# Patient Record
Sex: FEMALE | Race: BLACK OR AFRICAN AMERICAN | Employment: OTHER | URBAN - METROPOLITAN AREA
[De-identification: names, ages, dates, MRNs, and addresses within clinical notes are randomized per-mention and may not be internally consistent; named-entity substitution may affect disease eponyms.]

---

## 2017-04-01 RX ORDER — METOPROLOL SUCCINATE 100 MG/1
TABLET, EXTENDED RELEASE ORAL
Qty: 30 TAB | Refills: 6 | Status: SHIPPED | OUTPATIENT
Start: 2017-04-01 | End: 2017-10-23 | Stop reason: SDUPTHER

## 2017-05-05 ENCOUNTER — OFFICE VISIT (OUTPATIENT)
Dept: ORTHOPEDIC SURGERY | Age: 67
End: 2017-05-05

## 2017-05-05 ENCOUNTER — OFFICE VISIT (OUTPATIENT)
Dept: CARDIOLOGY CLINIC | Age: 67
End: 2017-05-05

## 2017-05-05 VITALS
BODY MASS INDEX: 35.31 KG/M2 | HEART RATE: 61 BPM | DIASTOLIC BLOOD PRESSURE: 90 MMHG | HEIGHT: 67 IN | SYSTOLIC BLOOD PRESSURE: 150 MMHG | OXYGEN SATURATION: 98 % | WEIGHT: 225 LBS

## 2017-05-05 VITALS
HEIGHT: 67 IN | HEART RATE: 76 BPM | DIASTOLIC BLOOD PRESSURE: 78 MMHG | WEIGHT: 226 LBS | BODY MASS INDEX: 35.47 KG/M2 | SYSTOLIC BLOOD PRESSURE: 136 MMHG

## 2017-05-05 DIAGNOSIS — R07.9 CHEST PAIN, UNSPECIFIED: Primary | ICD-10-CM

## 2017-05-05 DIAGNOSIS — R00.2 PALPITATIONS: ICD-10-CM

## 2017-05-05 DIAGNOSIS — M17.11 PRIMARY OSTEOARTHRITIS OF RIGHT KNEE: ICD-10-CM

## 2017-05-05 DIAGNOSIS — E78.5 DYSLIPIDEMIA: ICD-10-CM

## 2017-05-05 DIAGNOSIS — M17.0 PRIMARY OSTEOARTHRITIS OF BOTH KNEES: Primary | ICD-10-CM

## 2017-05-05 DIAGNOSIS — Z86.718 HISTORY OF BLOOD CLOTS: ICD-10-CM

## 2017-05-05 RX ORDER — CIPROFLOXACIN 250 MG/1
TABLET, FILM COATED ORAL EVERY 12 HOURS
COMMUNITY
End: 2017-09-06

## 2017-05-05 NOTE — PROGRESS NOTES
Review of Systems   Constitutional: Positive for malaise/fatigue. Negative for chills, diaphoresis, fever and weight loss. Respiratory: Negative. Cardiovascular: Positive for chest pain, palpitations, claudication, leg swelling and PND. Negative for orthopnea. Gastrointestinal: Positive for heartburn. Negative for abdominal pain, blood in stool, constipation, diarrhea, melena, nausea and vomiting. Musculoskeletal: Positive for back pain, joint pain, myalgias and neck pain. Negative for falls. Neurological: Negative for weakness.

## 2017-05-05 NOTE — MR AVS SNAPSHOT
Visit Information Date & Time Provider Department Dept. Phone Encounter #  
 5/5/2017  2:20 PM Ankit Nick, [address] [facility] [id number] Follow-up Instructions Return in about 8 months (around 1/5/2018), or if symptoms worsen or fail to improve. Your Appointments 5/5/2017  2:20 PM  
Follow Up with Ankit Nick DO [facility] [address] ([facility]) Appt Note: 10/10/16 - Return in about 6 months - Pt would like afternoon appt - CB; 5/4 confirmed. ...sb  
 [place] [address] [phone] [address] [place] [address].[address] Upcoming Health Maintenance Date Due Hepatitis C Screening 1950 LIPID PANEL Q1 1950 FOOT EXAM Q1 9/22/1962 MICROALBUMIN Q1 9/22/1962 EYE EXAM RETINAL OR DILATED Q1 9/22/1962 Pneumococcal 19-64 Medium Risk (1 of 1 - PPSV23) 9/22/1971 DTaP/Tdap/Td series (1 - Tdap) 9/22/1973 PAP AKA CERVICAL CYTOLOGY 9/22/1973 FOBT Q 1 YEAR AGE 50-75 9/22/2002 HEMOGLOBIN A1C Q6M 9/5/2010 ZOSTER VACCINE AGE 60> 9/22/2012 INFLUENZA AGE 9 TO ADULT 8/1/2017 BREAST CANCER SCRN MAMMOGRAM 10/28/2017 Allergies as of 5/5/2017  Review Complete On: 5/5/2017 By: Ankit Nick DO Severity Noted Reaction Type Reactions Pcn [Penicillins]  09/09/2011    Unknown (comments) Current Immunizations  Never Reviewed No immunizations on file. Not reviewed this visit You Were Diagnosed With   
  
 Codes Comments Chest pain, unspecified    -  Primary ICD-10-CM: R07.9 ICD-9-CM: 786.50 Dyslipidemia     ICD-10-CM: E78.5 ICD-9-CM: 272.4 Vitals BP Pulse Height(growth percentile) Weight(growth percentile) SpO2 BMI  
 140/84 61 5' 7\" (1.702 m) 225 lb (102.1 kg) 98% 35.24 kg/m2 OB Status Smoking Status Hysterectomy Never Smoker Vitals History BMI and BSA Data Body Mass Index Body Surface Area  
 35.24 kg/m 2 2.2 m 2 Preferred Pharmacy Pharmacy Name Phone [facility]/PHARMACY #[id number] Justine Jay, [address] [facility],[address]Th Floor [facility] [phone] Your Updated Medication List  
  
   
This list is accurate as of: 5/5/17  9:45 AM.  Always use your most recent med list.  
  
  
  
  
 ciprofloxacin HCl 250 mg tablet Commonly known as:  CIPRO Take  by mouth every twelve (12) hours. COZAAR 100 mg tablet Generic drug:  losartan Take 100 mg by mouth daily. GLUCOPHAGE 500 mg tablet Generic drug:  metFORMIN Take 500 mg by mouth daily (with breakfast). LEVOXYL 88 mcg tablet Generic drug:  levothyroxine Take 88 mcg by mouth daily. LEXAPRO 10 mg tablet Generic drug:  escitalopram oxalate Take 10 mg by mouth daily. LORazepam 1 mg tablet Commonly known as:  ATIVAN Take 0.5 mg by mouth two (2) times a day. metoprolol succinate 100 mg tablet Commonly known as:  TOPROL-XL  
TAKE ONE TABLET BY MOUTH DAILY pantoprazole 40 mg tablet Commonly known as:  PROTONIX Take 40 mg by mouth two (2) times a day. VITAMIN D2 PO Take 50,000 Units by mouth every Monday. We Performed the Following AMB POC EKG ROUTINE W/ 12 LEADS, INTER & REP [81107 CPT(R)] Follow-up Instructions Return in about 8 months (around 1/5/2018), or if symptoms worsen or fail to improve. Introducing [facility] & HEALTH SERVICES! [facility] introduces Audioms patient portal. Now you can access parts of your medical record, email your doctor's office, and request medication refills online. 1. In your internet browser, go to [email or web address] 2. Click on the First Time User? Click Here link in the Sign In box. You will see the New Member Sign Up page. 3. Enter your Audioms Access Code exactly as it appears below.  You will not need to use this code after youve completed the sign-up process. If you do not sign up before the expiration date, you must request a new code. · Echoing Green Access Code: [email or web address] Expires: 8/3/2017  8:26 AM 
 
4. Enter the last four digits of your Social Security Number (xxxx) and Date of Birth (mm/dd/yyyy) as indicated and click Submit. You will be taken to the next sign-up page. 5. Create a Echoing Green ID. This will be your Echoing Green login ID and cannot be changed, so think of one that is secure and easy to remember. 6. Create a Echoing Green password. You can change your password at any time. 7. Enter your Password Reset Question and Answer. This can be used at a later time if you forget your password. 8. Enter your e-mail address. You will receive e-mail notification when new information is available in [address] 9. Click Sign Up. You can now view and download portions of your medical record. 10. Click the Download Summary menu link to download a portable copy of your medical information. If you have questions, please visit the Frequently Asked Questions section of the Echoing Green website. Remember, Echoing Green is NOT to be used for urgent needs. For medical emergencies, dial 911. Now available from your iPhone and Android! Please provide this summary of care documentation to your next provider. Your primary care clinician is listed as Gillian Carlisle. If you have any questions after today's visit, please call [phone].

## 2017-05-05 NOTE — MR AVS SNAPSHOT
Visit Information Date & Time Provider Department Dept. Phone Encounter #  
 5/5/2017  7:50 AM Jamison Ventura, [address] [facility] [phone] [id number] Your Appointments 5/5/2017  2:20 PM  
Follow Up with Trever Baker DO [facility] [employer] [facility] Appt Note: 10/10/16 - Return in about 6 months - Pt would like afternoon appt - Ashish Munoz [phone] [address] [address] [phone] [address] Upcoming Health Maintenance Date Due Hepatitis C Screening 1950 LIPID PANEL Q1 1950 FOOT EXAM Q1 9/22/1962 MICROALBUMIN Q1 9/22/1962 EYE EXAM RETINAL OR DILATED Q1 9/22/1962 Pneumococcal 19-64 Medium Risk (1 of 1 - PPSV23) 9/22/1971 DTaP/Tdap/Td series (1 - Tdap) 9/22/1973 PAP AKA CERVICAL CYTOLOGY 9/22/1973 FOBT Q 1 YEAR AGE 50-75 9/22/2002 HEMOGLOBIN A1C Q6M 9/5/2010 ZOSTER VACCINE AGE 60> 9/22/2012 INFLUENZA AGE 9 TO ADULT 8/1/2017 BREAST CANCER SCRN MAMMOGRAM 10/28/2017 Allergies as of 5/5/2017  Review Complete On: 5/5/2017 By: Jamison Ventura MD  
  
 Severity Noted Reaction Type Reactions Pcn [Penicillins]  09/09/2011    Unknown (comments) Current Immunizations  Never Reviewed No immunizations on file. Not reviewed this visit You Were Diagnosed With   
  
 Codes Comments Primary osteoarthritis of both knees    -  Primary ICD-10-CM: M17.0 ICD-9-CM: 715.16 History of blood clots     ICD-10-CM: Z86.718 ICD-9-CM: V12.51 Primary osteoarthritis of right knee     ICD-10-CM: M17.11 ICD-9-CM: 715.16 Vitals BP Pulse Height(growth percentile) Weight(growth percentile) BMI OB Status 136/78 76 5' 7\" (1.702 m) 226 lb (102.5 kg) 35.4 kg/m2 Hysterectomy Smoking Status Never Smoker Vitals History BMI and BSA Data Body Mass Index Body Surface Area  
 35.4 kg/m 2 2.2 m 2 Preferred Pharmacy Pharmacy Name Phone [facility]/PHARMACY #[id number] [facility], [address] [place] [address],4[address] Floor [facility] [phone] Your Updated Medication List  
  
   
This list is accurate as of: 5/5/17  8:26 AM.  Always use your most recent med list.  
  
  
  
  
 ciprofloxacin HCl 250 mg tablet Commonly known as:  CIPRO Take  by mouth every twelve (12) hours. COZAAR 100 mg tablet Generic drug:  losartan Take 100 mg by mouth daily. GLUCOPHAGE 500 mg tablet Generic drug:  metFORMIN Take 500 mg by mouth daily (with breakfast). LEVOXYL 88 mcg tablet Generic drug:  levothyroxine Take 88 mcg by mouth daily. LEXAPRO 10 mg tablet Generic drug:  escitalopram oxalate Take 10 mg by mouth daily. LORazepam 1 mg tablet Commonly known as:  ATIVAN Take 0.5 mg by mouth two (2) times a day. metoprolol succinate 100 mg tablet Commonly known as:  TOPROL-XL  
TAKE ONE TABLET BY MOUTH DAILY pantoprazole 40 mg tablet Commonly known as:  PROTONIX Take 40 mg by mouth two (2) times a day. VITAMIN D2 PO Take 50,000 Units by mouth every Monday. Patient Instructions Deciding About Knee Replacement Surgery What is knee replacement surgery? Knee replacement surgery replaces the worn ends of the thighbone (femur) and the lower leg bone (tibia) where they meet at the knee. Your doctor will take out the damaged bone and replace it with plastic and metal parts. These new parts may be attached to your bones with cement. You will need a lot of rehabilitation, or rehab, after surgery. This takes several weeks. But you should be able to start to walk, climb stairs, sit in and get up from chairs, and do other daily movements within a few days. What are disla points about this decision?  
· The decision you and your doctor make depends on how much pain and disability you have. It also depends on your age, health, and activity level. · Most people have this surgery only when they can no longer control knee pain with other treatments and when the pain disrupts their lives. · Rehab after this surgery means doing daily exercises for several weeks. · Most knee replacements last for at least 15 years. You may need to have the knee replaced again. Why might you choose to replace your knee? · You are not able to do most of your activities without pain. · You have very bad arthritis pain. Other treatments have not helped. · You do not have other health problems that would make surgery too risky. · You are not worried that you may need to have another knee replacement later in life. · You aren't worried about side effects. These may include infections, blood clots, and loss of range of motion. · You are willing to do weeks of rehab. · You want to have the surgery before you lose too much of your ability to be active. If you are not able to stay active, strong, and flexible before the surgery, it can be harder to return to your normal activities after the surgery. Why might you choose not to replace your knee? · You can still do most of your activities. · You have other health problems that may make surgery too risky. · You want to try all other treatments first. 
· You want to avoid the side effects of surgery. · You can't do rehab after surgery. · You worry that you may need another knee replacement later in life. Your decision Thinking about the facts and your feelings can help you make a decision that is right for you. Be sure you understand the benefits and risks of your options, and think about what else you need to do before you make the decision. Where can you learn more? Go to http://gustabo-ruben.info/. Manuela Braun in the search box to learn more about \"Deciding About Knee Replacement Surgery. \" Current as of: May 23, 2016 Content Version: 11.2 © 7681-7883 RawFlow, Mixwit. Care instructions adapted under license by [employer] (which disclaims liability or warranty for this information). If you have questions about a medical condition or this instruction, always ask your healthcare professional. [address] any warranty or liability for your use of this information. Introducing [facility] & HEALTH SERVICES! Nan Talamantes introduces Wavebreak Media patient portal. Now you can access parts of your medical record, email your doctor's office, and request medication refills online. 1. In your internet browser, go to https://"[employer]. Crittercism/"[employer]." 2. Click on the First Time User? Click Here link in the Sign In box. You will see the New Member Sign Up page. 3. Enter your Wavebreak Media Access Code exactly as it appears below. You will not need to use this code after youve completed the sign-up process. If you do not sign up before the expiration date, you must request a new code. · Wavebreak Media Access Code: [email or web address] Expires: 8/3/2017  8:26 AM 
 
4. Enter the last four digits of your Social Security Number (xxxx) and Date of Birth (mm/dd/yyyy) as indicated and click Submit. You will be taken to the next sign-up page. 5. Create a Wavebreak Media ID. This will be your Wavebreak Media login ID and cannot be changed, so think of one that is secure and easy to remember. 6. Create a Wavebreak Media password. You can change your password at any time. 7. Enter your Password Reset Question and Answer. This can be used at a later time if you forget your password. 8. Enter your e-mail address. You will receive e-mail notification when new information is available in [address] 9. Click Sign Up. You can now view and download portions of your medical record. 10. Click the Download Summary menu link to download a portable copy of your medical information.  
 
If you have questions, please visit the Frequently Asked Questions section of the Zeer. Remember, CaseRevhart is NOT to be used for urgent needs. For medical emergencies, dial 911. Now available from your iPhone and Android! Please provide this summary of care documentation to your next provider. Your primary care clinician is listed as Hinda Cushing. If you have any questions after today's visit, please call [phone].

## 2017-05-05 NOTE — PATIENT INSTRUCTIONS
Deciding About Knee Replacement Surgery  What is knee replacement surgery? Knee replacement surgery replaces the worn ends of the thighbone (femur) and the lower leg bone (tibia) where they meet at the knee. Your doctor will take out the damaged bone and replace it with plastic and metal parts. These new parts may be attached to your bones with cement. You will need a lot of rehabilitation, or rehab, after surgery. This takes several weeks. But you should be able to start to walk, climb stairs, sit in and get up from chairs, and do other daily movements within a few days. What are disla points about this decision? · The decision you and your doctor make depends on how much pain and disability you have. It also depends on your age, health, and activity level. · Most people have this surgery only when they can no longer control knee pain with other treatments and when the pain disrupts their lives. · Rehab after this surgery means doing daily exercises for several weeks. · Most knee replacements last for at least 15 years. You may need to have the knee replaced again. Why might you choose to replace your knee? · You are not able to do most of your activities without pain. · You have very bad arthritis pain. Other treatments have not helped. · You do not have other health problems that would make surgery too risky. · You are not worried that you may need to have another knee replacement later in life. · You aren't worried about side effects. These may include infections, blood clots, and loss of range of motion. · You are willing to do weeks of rehab. · You want to have the surgery before you lose too much of your ability to be active. If you are not able to stay active, strong, and flexible before the surgery, it can be harder to return to your normal activities after the surgery. Why might you choose not to replace your knee? · You can still do most of your activities.   · You have other health problems that may make surgery too risky. · You want to try all other treatments first.  · You want to avoid the side effects of surgery. · You can't do rehab after surgery. · You worry that you may need another knee replacement later in life. Your decision  Thinking about the facts and your feelings can help you make a decision that is right for you. Be sure you understand the benefits and risks of your options, and think about what else you need to do before you make the decision. Where can you learn more? Go to http://gustabo-ruben.info/. Ruby Herrera in the search box to learn more about \"Deciding About Knee Replacement Surgery. \"  Current as of: May 23, 2016  Content Version: 11.2  © 5603-8174 Interactive Advisory Software, Incorporated. Care instructions adapted under license by [employer]ashrun (which disclaims liability or warranty for this information). If you have questions about a medical condition or this instruction, always ask your healthcare professional. [place] [address] any warranty or liability for your use of this information.

## 2017-05-05 NOTE — PROGRESS NOTES
1. Have you been to the ER, urgent care clinic since your last visit? Hospitalized since your last visit? No     2. Have you seen or consulted any other health care providers outside of the [address] [place] [address] since your last visit? Include any pap smears or colon screening.  No

## 2017-05-05 NOTE — PROGRESS NOTES
Patient: Tamiko Vaughn                MRN: [id number]       SSN: [id number]  YOB: 1950        AGE: 61 y.o. SEX: female  Body mass index is 35.4 kg/(m^2). PCP: Vignesh Storm MD  05/05/17    HISTORY: Blaine Castillo returns in followup. She reminds me that she has had numerous superficial blood clots in the legs, and she is interested in having her knee replaced. She works at [employer] and is thinking sometime in the summer. We are going to get her to see Dr. Gretchen Brower with regards to a possible filter if she would like to have her right knee replaced this summer. The pain is severe, 7/10. She has pain at night and pain with activities of daily living. It is quite restrictive. She has less than a five-minute level walking tolerance, and she is really fed up and frustrated. Injections have not worked, and activities of daily living are very restricted. It aches at school as well. Review of systems is negative for fevers, chills, night sweats, or weight loss. She denies shortness of breath or chest pain. She has had no recent blood clots. She does wear her stockings. It has been a while since she has seen Vascular. PHYSICAL EXAMINATION:  On examination today, she has very good pulses in the feet and minimal peripheral edema. There is just slight venostasis. The hips rotate adequately. Both knees are very stiff with at least a 5° fixed flexion deformity and some ankylosis in flexion to about 105°. The calf is nontender. Elroy's sign is negative. Sensation is grossly intact to L4-5. No foot drop. Tib/ant and EHL are 5/5. RADIOGRAPHS:  Review of her x-rays from previous confirms end-staged arthritis really of both knees.      PLAN:  We had a lengthy discussion regarding risks and benefits including but not limited to infection, DVT, pulmonary embolism, anesthetic complications, blood loss requiring transfusion, pain, stiffness, implant longevity and other complications. All questions were answered and she would like to proceed. She is going to let us know when she would like to have surgery. I kindly as for medical clearance. We will ask for vascular clearance as well. She may be a candidate for a filter. REVIEW OF SYSTEMS:      CON: negative for weight loss, fever  EYE: negative for double vision  ENT: negative for hoarseness  RS:   negative for Tb  GI:    negative for blood in stool  :  negative for blood in urine  Other systems reviewed and noted below. Past Medical History:   Diagnosis Date    Breast cyst 1994    benign, removed    Deviated septum 04/2010    septoplasty    Diabetes mellitus     Type 2    GERD (gastroesophageal reflux disease)     severe    Hypothyroidism     Other elective surgery July 2008    toe surgery    Pure hypercholesterolemia     Stress thallium 09/30/2009    No evidence of ischemia or infarction; EF 78%; EKG portion negative with exercise capacity below average for age at 6 min of exercise    Varicose veins     closure in right leg 2008 & left leg 2007    Venous reflux study 06/24/2014    [facility] DVT[facility]. Family History   Problem Relation Age of Onset    Heart Attack Maternal Grandmother     Hypertension Mother        Current Outpatient Prescriptions   Medication Sig Dispense Refill    ciprofloxacin HCl (CIPRO) 250 mg tablet Take  by mouth every twelve (12) hours.  metoprolol succinate (TOPROL-XL) 100 mg tablet TAKE ONE TABLET BY MOUTH DAILY 30 Tab 6    losartan (COZAAR) 100 mg tablet Take 100 mg by mouth daily.  escitalopram oxalate (LEXAPRO) 10 mg tablet Take 10 mg by mouth daily.  pantoprazole (PROTONIX) 40 mg tablet Take 40 mg by mouth two (2) times a day.  metformin (GLUCOPHAGE) 500 mg tablet Take 500 mg by mouth daily (with breakfast).  levothyroxine (LEVOXYL) 88 mcg tablet Take 88 mcg by mouth daily.       lorazepam (ATIVAN) 1 mg tablet Take 0.5 mg by mouth two (2) times a day.  ERGOCALCIFEROL, VITAMIN D2, (VITAMIN D PO) Take 50,000 Units by mouth every Monday. Allergies   Allergen Reactions    Pcn [Penicillins] Unknown (comments)       Past Surgical History:   Procedure Laterality Date    CYSTOTOMY,EXCIS BLADDER TUMOR  2006    HX PARTIAL HYSTERECTOMY  1994    HI EGD DELIVER THERMAL ENERGY SPHNCTR/CARDIA GERD      THYROIDECTOMY  1992       Social History     Social History    Marital status:      Spouse name: N/A    Number of children: N/A    Years of education: N/A     Occupational History    Not on file. Social History Main Topics    Smoking status: Never Smoker    Smokeless tobacco: Never Used    Alcohol use No    Drug use: Not on file    Sexual activity: Not on file     Other Topics Concern    Not on file     Social History Narrative       Visit Vitals    /78    Pulse 76    Ht 5' 7\" (1.702 m)    Wt 226 lb (102.5 kg)    BMI 35.4 kg/m2         PHYSICAL EXAMINATION:  GENERAL: Alert and oriented x3, in no acute distress, well-developed, well-nourished, afebrile. HEART: No JVD. EYES: No scleral icterus   NECK: No significant lymphadenopathy   LUNGS: No respiratory compromise or indrawing  ABDOMEN: Soft, non-tender, non-distended. Electronically signed by:  Laisha Hernandez MD

## 2017-05-05 NOTE — PROGRESS NOTES
HPI: I saw Medina Leal in my office today in cardiovascular evaluation regarding her past problems with palpitations and some atypical anginal chest discomfort. Ms. Prateek Overton is a pleasant, very anxious, 61year old  female with history of significant chest discomforts over the years, which have been felt to be related to gastroesophageal reflux disease and some to musculoskeletal issues. She did have a nuclear myocardial perfusion study for these atypical non anginal symptoms back in 2009, which was completely within normal limits. He comes in today relates that she is doing about the same as she has for some time. She does get intermittent chest pains on the left side but she thinks that this is related to pulling a bag along and lifting it with her left arm and just lasts for seconds but certainly sounds noncardiac. She has rare palpitations and some chronic lower extremity edema but this seems to get better overnight and worse when she is up throughout the day to suggest is from venous insufficiency. She does complain of some leg discomfort with walking but it really does not sound like claudication but simply knee arthritis. Encounter Diagnoses   Name Primary?  Chest pain, unspecified Yes    Dyslipidemia     Palpitations        Discussion: This patient appears to be doing about as well as we could expect and really have no recommendations for change at this time. She is not having any symptoms to suggest the development of symptomatic obstructive coronary artery disease and her palpitation issues seem to be very transient lasting for only seconds and infrequent, so I do not think they need any further workup either. She historically has a dyslipidemia but she has been intolerant to statin drugs even though they are not listed as an allergy and she says herself she thinks her myalgias may be more in her head and in reality since she is somewhat afraid of taking statin drugs.   I am going to leave management of this problem to Dr. Iker Ferrer. Her blood pressure was somewhat elevated today and I rechecked it myself and got 150/90, but she freely admits that she has not taken any of her blood pressure medications today so I told her to be sure she takes her blood pressure medications before she goes to see Dr. Iker Ferrer so he can get a good idea of whether the blood pressure medications are working. Since she is otherwise doing well I will just see her again in several months. PCP: Osvaldo Guillen MD      Past Medical History:   Diagnosis Date    Breast cyst 1994    benign, removed    Deviated septum 04/2010    septoplasty    Diabetes mellitus     Type 2    GERD (gastroesophageal reflux disease)     severe    Hypothyroidism     Other elective surgery July 2008    toe surgery    Pure hypercholesterolemia     Stress thallium 09/30/2009    No evidence of ischemia or infarction; EF 78%; EKG portion negative with exercise capacity below average for age at 6 min of exercise    Varicose veins     closure in right leg 2008 & left leg 2007    Venous reflux study 06/24/2014    No DVT. [facility] occlusions of bilateral GSV. Past Surgical History:   Procedure Laterality Date    CYSTOTOMY,EXCIS BLADDER TUMOR  2006    HX PARTIAL HYSTERECTOMY  1994    CT EGD DELIVER THERMAL ENERGY SPHNCTR/CARDIA GERD      THYROIDECTOMY  1992         Current Outpatient Rx   Name  Route  Sig  Dispense  Refill    losartan (COZAAR) 100 mg tablet    Oral    Take 100 mg by mouth daily.  atorvastatin (LIPITOR) 10 mg tablet    Oral    Take 10 mg by mouth daily.  escitalopram oxalate (LEXAPRO) 10 mg tablet    Oral    Take 10 mg by mouth daily.  metoprolol succinate (TOPROL-XL) 100 mg XL tablet    Oral    Take 1 Tab by mouth daily. 30 Tab    6      pantoprazole (PROTONIX) 40 mg tablet    Oral    Take 40 mg by mouth two (2) times a day.               metformin (GLUCOPHAGE) 500 mg tablet    Oral    Take 500 mg by mouth daily (with breakfast).  levothyroxine (LEVOXYL) 88 mcg tablet    Oral    Take 88 mcg by mouth daily.  lorazepam (ATIVAN) 1 mg tablet    Oral    Take 0.5 mg by mouth two (2) times a day.  ERGOCALCIFEROL, VITAMIN D2, (VITAMIN D PO)    Oral    Take 50,000 Units by mouth every Monday. Allergies   Allergen Reactions    Pcn [Penicillins] Unknown (comments)         Social History:   Social History   Substance Use Topics    Smoking status: Never Smoker    Smokeless tobacco: Never Used    Alcohol use No         Family history: family history includes Heart Attack in her maternal grandmother; Hypertension in her mother. Review of Systems:   Constitutional: Positive for malaise/fatigue. Negative for chills, diaphoresis, fever and weight loss. Respiratory: Negative. Cardiovascular: Positive for chest pain, palpitations, claudication, and leg swelling. Negative for orthopnea. Gastrointestinal: Positive for heartburn. Negative for abdominal pain, blood in stool, constipation, diarrhea, melena, nausea and vomiting. Musculoskeletal: Positive for back pain, joint pain, myalgias and neck pain. Negative for falls. Neurological: Negative for weakness. Physical Exam:   The patient is an alert, oriented, well developed, well nourished 61 y.o.  female who was in no acute distress at the time of my examination. Visit Vitals    /84    Pulse 61    Ht 5' 7\" (1.702 m)    Wt 102.1 kg (225 lb)    SpO2 98%    BMI 35.24 kg/m2      BP Readings from Last 3 Encounters:   05/05/17 140/84   05/05/17 136/78   10/13/16 140/73        Wt Readings from Last 3 Encounters:   05/05/17 102.1 kg (225 lb)   05/05/17 102.5 kg (226 lb)   10/13/16 99.8 kg (220 lb)       HEENT: Conjunctivae white, mucosa moist, no pallor or cyanosis. Neck: Supple without masses, tenderness or thyromegaly. No jugular venous distention. Carotid upstrokes are full bilaterally, without bruits. Cardiovascular: Chest is symmetrical with good excursion. There is some mild tenderness to palpation over the fifth left costochondral junction parasternally. [place] is not displaced. No lifts, heaves or thrills. S1 and S2 are normal, with a soft grade I-II/VI [place] along the LSB, with radiation to the base, without appreciable rubs, clicks or gallops. Lungs: Clear to auscultation in all fields. Abdomen: Soft. No masses or organomegaly. There is some epigastric tenderness. Extremities: No edema, with full peripheral pulses. Review of Data: See PMH and Cardiology and Imaging sections for cardiac testing      Results for orders placed or performed in visit on 05/05/17   AMB POC EKG ROUTINE W/ 12 LEADS, INTER & REP     Status: None    Narrative    Normal sinus rhythm, rate 61. This EKG is within normal limits and similar to the EKG of October 10, 2016. Brandy Williamson D.O., F.A.C.C. [facility]  [facility]  [address] [place] [address]. [address]    PLEASE NOTE:  This document has been produced using voice recognition software. Unrecognized errors in transcription may be present.

## 2017-05-23 ENCOUNTER — OFFICE VISIT (OUTPATIENT)
Dept: VASCULAR SURGERY | Age: 67
End: 2017-05-23

## 2017-05-23 VITALS
BODY MASS INDEX: 35.31 KG/M2 | DIASTOLIC BLOOD PRESSURE: 80 MMHG | SYSTOLIC BLOOD PRESSURE: 132 MMHG | RESPIRATION RATE: 13 BRPM | HEIGHT: 67 IN | WEIGHT: 225 LBS | HEART RATE: 71 BPM

## 2017-05-23 DIAGNOSIS — Z86.79: Primary | ICD-10-CM

## 2017-05-23 DIAGNOSIS — Z82.49 FAMILY HX OF AORTIC ANEURYSM: ICD-10-CM

## 2017-05-23 PROBLEM — Z83.2: Status: ACTIVE | Noted: 2017-05-23

## 2017-05-23 RX ORDER — CETIRIZINE HCL 10 MG
10 TABLET ORAL DAILY
COMMUNITY
End: 2021-01-29 | Stop reason: ALTCHOICE

## 2017-05-23 NOTE — PROGRESS NOTES
Amber Carson    Chief Complaint   Patient presents with    New Patient       History and Physical    68-year-old diabetic female here for evaluation prior to total joint replacement. I seen her in the past she has had a history of superficial venous thrombosis treated by Dr. Marroquin Brush I done bilateral venous closure procedures on her since then. Her superficial venous disorders been without symptom. She wears support stockings. She still works full-time at [employer]. She is considering a right total knee replacement this summer. She has not had a pulmonary embolism in the past nor a filter procedure. Interestingly her  has a filter that I placed many years ago in him    Past Medical History:   Diagnosis Date    Breast cyst 1994    benign, removed    Deviated septum 04/2010    septoplasty    Diabetes mellitus     Type 2    GERD (gastroesophageal reflux disease)     severe    Hypothyroidism     Other elective surgery July 2008    toe surgery    Pure hypercholesterolemia     Stress thallium 09/30/2009    No evidence of ischemia or infarction; EF 78%; EKG portion negative with exercise capacity below average for age at 6 min of exercise    Varicose veins     closure in right leg 2008 & left leg 2007    Venous reflux study 06/24/2014    No DVT. [facility] occlusions of bilateral GSV.      Patient Active Problem List   Diagnosis Code    Diabetes mellitus, type 2 E11.9    Hyperthyroidism E05.90    Dyslipidemia E78.5    Esophageal reflux K21.9    Chest pain, unspecified R07.9    Hx of venous disease Z86.79    Family hx of aortic aneurysm Z82.49     Past Surgical History:   Procedure Laterality Date    CYSTOTOMY,EXCIS BLADDER TUMOR  2006    HX PARTIAL HYSTERECTOMY  1994    ID EGD DELIVER THERMAL ENERGY SPHNCTR/CARDIA GERD      THYROIDECTOMY  1992     Current Outpatient Prescriptions   Medication Sig Dispense Refill    cetirizine (ZYRTEC) 10 mg tablet Take  by mouth two (2) times daily as needed for Allergies.  metoprolol succinate (TOPROL-XL) 100 mg tablet TAKE ONE TABLET BY MOUTH DAILY 30 Tab 6    losartan (COZAAR) 100 mg tablet Take 100 mg by mouth daily.  escitalopram oxalate (LEXAPRO) 10 mg tablet Take 10 mg by mouth daily.  pantoprazole (PROTONIX) 40 mg tablet Take 40 mg by mouth two (2) times a day.  metformin (GLUCOPHAGE) 500 mg tablet Take 500 mg by mouth daily (with breakfast).  levothyroxine (LEVOXYL) 88 mcg tablet Take 88 mcg by mouth daily.  lorazepam (ATIVAN) 1 mg tablet Take 0.5 mg by mouth two (2) times a day.  ERGOCALCIFEROL, VITAMIN D2, (VITAMIN D PO) Take 50,000 Units by mouth every Monday.  ciprofloxacin HCl (CIPRO) 250 mg tablet Take  by mouth every twelve (12) hours. Allergies   Allergen Reactions    Pcn [Penicillins] Unknown (comments)       Review of Systems    A full review of systems was completed times ten organ systems and was deemed negative unless otherwise mentioned in the HPI. Physical   Visit Vitals    /80 (BP 1 Location: Left arm, BP Patient Position: Sitting)    Pulse 71    Resp 13    Ht 5' 7\" (1.702 m)    Wt 225 lb (102.1 kg)    BMI 35.24 kg/m2       Healthy-appearing youthful 66 good spirits  No facial symmetry pupils are reactive  Neck no JVD  Chest clear  Cardiac regular  Abdomen soft nondistended  Extremities range of motion strength seem equal she has pain in her right knee  Wearing her stockings today  Easily palpable peripheral pulses  No skin ulceration notable  She has an occasional varicose vein that is touchy in the right inner thigh near popliteal fossa     Impression/Plan:     ICD-10-CM ICD-9-CM    1. Hx of venous disease Z86.79 V12.59 DUPLEX LOWER EXT VENOUS BILAT AMB      DUPLEX AORTA ILIAC GRAFT COMPLETE AMB   2.  Family hx of aortic aneurysm Z82.49 V17.49 DUPLEX LOWER EXT VENOUS BILAT AMB      DUPLEX AORTA ILIAC GRAFT COMPLETE AMB   will check a Doppler of her veins both legs  Has never had a DVT or pulmonary embolism  If venous study looks well she can continue with plan for total joint with postop anticoagulation stockings  Any sign of DVT or venous disorder will talk about risk and possibility of filter  Arterial vascular intact  We will get a screening ultrasound regarding her mother's history of abdominal aneurysm  Orders Placed This Encounter    DUPLEX LOWER EXT VENOUS BILAT AMB    DUPLEX AORTA ILIAC GRAFT COMPLETE AMB    cetirizine (ZYRTEC) 10 mg tablet       Follow-up Disposition:  Return in about 4 weeks (around 6/20/2017). Amilcar Bryant MD    PLEASE NOTE:  This document has been produced using voice recognition software. Unrecognized errors in transcription may be present.

## 2017-06-12 ENCOUNTER — HOSPITAL ENCOUNTER (OUTPATIENT)
Dept: MAMMOGRAPHY | Age: 67
Discharge: HOME | End: 2017-06-12
Attending: OBSTETRICS & GYNECOLOGY
Payer: COMMERCIAL

## 2017-06-12 DIAGNOSIS — Z12.31 VISIT FOR SCREENING MAMMOGRAM: ICD-10-CM

## 2017-06-12 PROCEDURE — 77067 SCR MAMMO BI INCL CAD: CPT

## 2017-06-26 ENCOUNTER — OFFICE VISIT (OUTPATIENT)
Dept: VASCULAR SURGERY | Age: 67
End: 2017-06-26

## 2017-06-26 DIAGNOSIS — Z82.49 FAMILY HX OF AORTIC ANEURYSM: ICD-10-CM

## 2017-06-26 DIAGNOSIS — Z86.79: ICD-10-CM

## 2017-06-26 NOTE — PROCEDURES
[facility]   *** FINAL REPORT ***    Name: Reji Padilla  MRN: [id number]       Outpatient  : 22 Sep 1950  HIS Order #: [id number]  [address] [facility] Visit #: [id number]  Date: 2017    TYPE OF TEST: Peripheral Venous Testing    REASON FOR TEST  Pain in limb    Right Leg:-  Deep venous thrombosis:           No  Superficial venous thrombosis:    No  Deep venous insufficiency:        Not examined  Superficial venous insufficiency: Not examined    Left Leg:-  Deep venous thrombosis:           No  Superficial venous thrombosis:    No  Deep venous insufficiency:        Not examined  Superficial venous insufficiency: Not examined      INTERPRETATION/FINDINGS  Duplex images were obtained using 2-D gray scale, color flow and  spectral doppler analysis. 1. No evidence of deep venous thrombosis identified in the common  femoral, femoral, profunda, popliteal, posterior tibial or peroneal  veins bilaterally. 2. History of bilateral great saphenous vein ablation, T0. No significant changes when compare to the previous exam.    ADDITIONAL COMMENTS    I have personally reviewed the data relevant to the interpretation of  this  study. TECHNOLOGIST: Kem Wei RVT, IRWIN  Signed: 2017 10:04 AM    PHYSICIAN: Randy Davidson MD  Signed: 2017 02:14 PM

## 2017-06-26 NOTE — PROCEDURES
[facility]   *** FINAL REPORT ***    Name: Carlitos Davies  MRN: [id number]       Outpatient  : 22 Sep 1950  HIS Order #: [id number]  [address] [facility] Visit #: [id number]  Date: 2017    TYPE OF TEST: Aorto-Iliac Duplex    REASON FOR TEST  Suspected aortic aneurysm    B-Mode:-                 (cm)   1     2     3  Aortic diameter:         AP:     2.3   1.7   1.9                           TV:     2.3   1.8   1.9  Common iliac diameter:   Right: 1.30                           Left:  1.30    Duplex:-                           PSV  Stenosis                           ----- --------------------  Aorta: (1)                86.0 Normal         (2)                 8.0         (3)    Right common iliac:       94.0 Normal  Right external iliac:    Left common iliac:       109.0 Normal  Left external iliac:     146.0 Normal    INTERPRETATION/FINDINGS  Duplex images were obtained using 2-D gray scale, color flow and  spectral doppler analysis. 1. Abdominal aorta is within normal limits, no aneurysm identified. 2. Bilateral common iliac arteries patent without significant  stenosis. 3. Bilateral external iliac arterires patent. 4. Celiac, SMA and distal renal arteries patent. No prior study. ADDITIONAL COMMENTS  Celiac: 109 cm/sec   SMA: 118 cm/sec  RRA Dst: 71 cm/sec   LRA Dst: 84 cm/sec  Infra Ao/REIA velocity omitted by technical error. I have personally reviewed the data relevant to the interpretation of  this  study. TECHNOLOGIST: Ghassan Wei RVT, BS  Signed: 2017 09:59 AM    PHYSICIAN: Tricia Klinefelter A. Thedore Pries, MD  Signed: 2017 02:13 PM

## 2017-06-30 ENCOUNTER — OFFICE VISIT (OUTPATIENT)
Dept: VASCULAR SURGERY | Age: 67
End: 2017-06-30

## 2017-06-30 VITALS
RESPIRATION RATE: 18 BRPM | WEIGHT: 225 LBS | SYSTOLIC BLOOD PRESSURE: 144 MMHG | HEART RATE: 70 BPM | DIASTOLIC BLOOD PRESSURE: 70 MMHG | HEIGHT: 67 IN | BODY MASS INDEX: 35.31 KG/M2

## 2017-06-30 DIAGNOSIS — Z86.79: Primary | ICD-10-CM

## 2017-06-30 NOTE — PROGRESS NOTES
Kwesi Shahid    Chief Complaint   Patient presents with    Leg Pain    Swelling       History and Physical    Most pleasant 79-year-old female following up today regarding her vascular test.  She had some vague abdominal discomfort had a hunting memory of her mother's aneurysms so we have done an ultrasound to evaluate her aorta and its branches. She has planning an upcoming total joint replacement of the knee she has history of venous closures was concerned about any venous risk or filter evaluation regarding knee replacement. She tells me she feels well except for her knee pain she is on summer break from school right now. She is a non-smoker enjoys exercise but is limited because her knees hurt    Past Medical History:   Diagnosis Date    Breast cyst 1994    benign, removed    Deviated septum 04/2010    septoplasty    Diabetes mellitus     Type 2    GERD (gastroesophageal reflux disease)     severe    Hypothyroidism     Other elective surgery July 2008    toe surgery    Pure hypercholesterolemia     Stress thallium 09/30/2009    No evidence of ischemia or infarction; EF 78%; EKG portion negative with exercise capacity below average for age at 6 min of exercise    Varicose veins     closure in right leg 2008 & left leg 2007    Venous reflux study 06/24/2014    No DVT. [facility] occlusions of bilateral GSV.      Patient Active Problem List   Diagnosis Code    Diabetes mellitus, type 2 E11.9    Hyperthyroidism E05.90    Dyslipidemia E78.5    Esophageal reflux K21.9    Chest pain, unspecified R07.9    Hx of venous disease Z86.79    Family hx of aortic aneurysm Z82.49     Past Surgical History:   Procedure Laterality Date    CYSTOTOMY,EXCIS BLADDER TUMOR  2006    HX PARTIAL HYSTERECTOMY  1994    MD EGD DELIVER THERMAL ENERGY SPHNCTR/CARDIA GERD      THYROIDECTOMY  1992     Current Outpatient Prescriptions   Medication Sig Dispense Refill    cetirizine (ZYRTEC) 10 mg tablet Take  by mouth two (2) times daily as needed for Allergies.  ciprofloxacin HCl (CIPRO) 250 mg tablet Take  by mouth every twelve (12) hours.  metoprolol succinate (TOPROL-XL) 100 mg tablet TAKE ONE TABLET BY MOUTH DAILY 30 Tab 6    losartan (COZAAR) 100 mg tablet Take 100 mg by mouth daily.  escitalopram oxalate (LEXAPRO) 10 mg tablet Take 10 mg by mouth daily.  pantoprazole (PROTONIX) 40 mg tablet Take 40 mg by mouth two (2) times a day.  metformin (GLUCOPHAGE) 500 mg tablet Take 500 mg by mouth daily (with breakfast).  levothyroxine (LEVOXYL) 88 mcg tablet Take 88 mcg by mouth daily.  lorazepam (ATIVAN) 1 mg tablet Take 0.5 mg by mouth two (2) times a day.  ERGOCALCIFEROL, VITAMIN D2, (VITAMIN D PO) Take 50,000 Units by mouth every Monday. Allergies   Allergen Reactions    Pcn [Penicillins] Unknown (comments)       Review of Systems    A full review of systems was completed times ten organ systems and was deemed negative unless otherwise mentioned in the HPI. Physical   Visit Vitals    /70 (BP 1 Location: Left arm, BP Patient Position: Sitting)    Pulse 70    Resp 18    Ht 5' 7\" (1.702 m)    Wt 225 lb (102.1 kg)    BMI 35.24 kg/m2       In good spirits today no distress  Head is normocephalic pupils are reactive bilateral  No facial symmetry observed  Neck no JVD  Chest clear  Abdomen soft nontender Limited exam  Extremities she is wearing her stockings today range of motion strength are equal  She is easily palpable pulses no signs of arterial insufficiency   no skin ulcerations no signs of superficial infection  Aortic Doppler shows no evidence of aneurysm she has good flow through the inflow segments branches and iliacs also patent without aneurysm  Venous study reveals no DVT bilateral, greater saphenous veins ablated without recurrence      Impression/Plan:     ICD-10-CM ICD-9-CM    1.  Hx of venous disease Z86.79 V12.59     no appearance of aneurysm by Doppler, would recommend ultrasound in 5 years with her strong family history  No DVT no history of DVT bleeding or pulmonary embolism  Would not recommend a filter at this time she should do postop anticoagulation stockings with early ambulation  She is cleared from my standpoint for total joint replacement  No orders of the defined types were placed in this encounter. Follow-up Disposition:  Return if symptoms worsen or fail to improve. Chioma Alfaro MD    PLEASE NOTE:  This document has been produced using voice recognition software. Unrecognized errors in transcription may be present.

## 2017-07-11 ENCOUNTER — TELEPHONE (OUTPATIENT)
Dept: ORTHOPEDIC SURGERY | Age: 67
End: 2017-07-11

## 2017-07-11 NOTE — TELEPHONE ENCOUNTER
Routed this message to [facility] since Dr. Ara Solis stated in his last note that he went over surgery with patient and he just wanted clearance from her other physicians before moving forward.

## 2017-07-11 NOTE — TELEPHONE ENCOUNTER
PT CALLED [facility] OFFICE TO SAY THAT SHE WENT TO PCP/CARDIOLOGY AND VASCULAR DR AND WANTS TO KNOW WHAT THE NEXT STEP IS TO GET SURGERY ON RT KNEE REPLACEMENT.  PLEASE CALL PT TO ADVISE

## 2017-08-25 ENCOUNTER — TELEPHONE (OUTPATIENT)
Dept: CARDIOLOGY CLINIC | Age: 67
End: 2017-08-25

## 2017-08-25 DIAGNOSIS — M17.11 PRIMARY OSTEOARTHRITIS OF RIGHT KNEE: ICD-10-CM

## 2017-08-25 DIAGNOSIS — Z01.818 PREOP EXAMINATION: Primary | ICD-10-CM

## 2017-08-25 NOTE — TELEPHONE ENCOUNTER
Laquita Hernandez from Dr. Idris Posey office called requesting clearance for patient to have right total knee replacement on 9/18/17. I will discuss with Dr. Vivek Dahl.

## 2017-09-06 ENCOUNTER — HOSPITAL ENCOUNTER (OUTPATIENT)
Dept: GENERAL RADIOLOGY | Age: 67
Discharge: HOME | End: 2017-09-06
Payer: COMMERCIAL

## 2017-09-06 ENCOUNTER — HOSPITAL ENCOUNTER (OUTPATIENT)
Dept: PREADMISSION TESTING | Age: 67
Discharge: HOME | End: 2017-09-06
Payer: COMMERCIAL

## 2017-09-06 ENCOUNTER — HOSPITAL ENCOUNTER (OUTPATIENT)
Dept: LAB | Age: 67
Discharge: HOME | End: 2017-09-06

## 2017-09-06 DIAGNOSIS — Z01.818 PREOP EXAMINATION: ICD-10-CM

## 2017-09-06 DIAGNOSIS — M17.11 PRIMARY OSTEOARTHRITIS OF RIGHT KNEE: ICD-10-CM

## 2017-09-06 LAB
ABO + RH BLD: NORMAL
ATRIAL RATE: 60 BPM
BLOOD GROUP ANTIBODIES SERPL: NORMAL
CALCULATED P AXIS, ECG09: 52 DEGREES
CALCULATED R AXIS, ECG10: 25 DEGREES
CALCULATED T AXIS, ECG11: 32 DEGREES
DIAGNOSIS, 93000: NORMAL
P-R INTERVAL, ECG05: 154 MS
Q-T INTERVAL, ECG07: 444 MS
QRS DURATION, ECG06: 68 MS
QTC CALCULATION (BEZET), ECG08: 444 MS
SENTARA SPECIMEN COL,SENBCF: NORMAL
SPECIMEN EXP DATE BLD: NORMAL
VENTRICULAR RATE, ECG03: 60 BPM

## 2017-09-06 PROCEDURE — 93005 ELECTROCARDIOGRAM TRACING: CPT

## 2017-09-06 PROCEDURE — 71020 XR CHEST PA LAT: CPT

## 2017-09-06 PROCEDURE — 99001 SPECIMEN HANDLING PT-LAB: CPT | Performed by: PHYSICIAN ASSISTANT

## 2017-09-06 PROCEDURE — 86900 BLOOD TYPING SEROLOGIC ABO: CPT | Performed by: PHYSICIAN ASSISTANT

## 2017-09-07 NOTE — PROGRESS NOTES
Mic Mialn attended the Joint Replacement Pre-Operative class on 9/6/17. Topics discussed included surgery preparation, what to expect the day of surgery, medications, physical and occupational therapy, and discharge planning. It was discussed that this is considered an elective surgery and that prior to the surgery they need to make decisions such as arranging for help at home once they are discharged. She was given a knee patient education notebook to take home. Opportunity was given to ask questions and phone number of the Orthopaedic Nurse Clinician was given for any questions or concerns that may arise later.

## 2017-09-13 ENCOUNTER — OFFICE VISIT (OUTPATIENT)
Dept: ORTHOPEDIC SURGERY | Facility: CLINIC | Age: 67
End: 2017-09-13

## 2017-09-13 ENCOUNTER — DOCUMENTATION ONLY (OUTPATIENT)
Dept: ORTHOPEDIC SURGERY | Facility: CLINIC | Age: 67
End: 2017-09-13

## 2017-09-13 VITALS
RESPIRATION RATE: 18 BRPM | HEART RATE: 65 BPM | OXYGEN SATURATION: 95 % | SYSTOLIC BLOOD PRESSURE: 166 MMHG | TEMPERATURE: 97.7 F | BODY MASS INDEX: 35.79 KG/M2 | WEIGHT: 228 LBS | DIASTOLIC BLOOD PRESSURE: 72 MMHG | HEIGHT: 67 IN

## 2017-09-13 DIAGNOSIS — M17.11 PRIMARY OSTEOARTHRITIS OF RIGHT KNEE: ICD-10-CM

## 2017-09-13 DIAGNOSIS — M25.561 RIGHT KNEE PAIN, UNSPECIFIED CHRONICITY: Primary | ICD-10-CM

## 2017-09-13 RX ORDER — LEVOFLOXACIN 500 MG/1
TABLET, FILM COATED ORAL
Qty: 5 TAB | Refills: 0 | Status: SHIPPED | OUTPATIENT
Start: 2017-09-13 | End: 2017-09-20

## 2017-09-13 RX ORDER — ACETAMINOPHEN 325 MG/1
1000 TABLET ORAL ONCE
Status: CANCELLED | OUTPATIENT
Start: 2017-09-13 | End: 2017-09-13

## 2017-09-13 RX ORDER — WARFARIN 1 MG/1
10 TABLET ORAL ONCE
Status: CANCELLED | OUTPATIENT
Start: 2017-09-13 | End: 2017-09-13

## 2017-09-13 RX ORDER — OXYCODONE HCL 10 MG/1
20 TABLET, FILM COATED, EXTENDED RELEASE ORAL EVERY 12 HOURS
Status: CANCELLED | OUTPATIENT
Start: 2017-09-13

## 2017-09-13 RX ORDER — PREGABALIN 25 MG/1
75 CAPSULE ORAL ONCE
Status: CANCELLED | OUTPATIENT
Start: 2017-09-13 | End: 2017-09-13

## 2017-09-13 RX ORDER — CELECOXIB 100 MG/1
400 CAPSULE ORAL ONCE
Status: CANCELLED | OUTPATIENT
Start: 2017-09-13 | End: 2017-09-13

## 2017-09-13 NOTE — PROGRESS NOTES
Patient dropped off her FMLA paperwork at the [facility] office. Patient can be reached at [phone].

## 2017-09-13 NOTE — PATIENT INSTRUCTIONS
Patient: Bartolo Tinoco                MRN: [id number]       SSN: [id number]  YOB: 1950        AGE: 61 y.o. SEX: female  Body mass index is 35.71 kg/(m^2). 09/13/17    DO:  1:  Sit with the leg out straight 5-10 min every hour while awake. Keep the toes pointed       up towards the rustam. 2.  Bend your knee 5-10 min every hour. Bend it to the point of pain and hold it for 5-10       Min. 3.  ICE your knee 20 min every hour    DO NOT:    1. Do not place anything under your knee to prop it up  2. Do not sit in the recliner chair.

## 2017-09-13 NOTE — H&P
[address], [address] [facility]  [phone]           HISTORY & PHYSICAL      Patient: Merary Angelo                MRN: [id number]       SSN: [id number]  YOB: 1950        AGE: 61 y.o. SEX: female  Body mass index is 35.71 kg/(m^2). PCP: Stephenie Madrigal MD  09/13/17      CC: right knee end stage OA  Problem List Items Addressed This Visit     None      Visit Diagnoses     Right knee pain, unspecified chronicity    -  Primary    Relevant Orders    AMB POC XRAY, KNEE; COMPLETE, 4+ VIEW (Completed)    Primary osteoarthritis of right knee                HPI:  The patient is a pleasant 61 y.o. whom has end stage OA of their Right knee and has failed conservative treatment including but not limited to NSAIDS, cortisone injections, viscosupplementation, PT, and pain medicine. Due to the current findings and affected activities of daily living, surgical intervention is indicated. The alternatives, risks, complications, as well as expected outcome were discussed. These include but are not limited to infection, blood loss, need for blood transfusion, neurovascular damage, DVT, PE,  post-op stiffness and pain, leg length discrepancy, dislocation, anesthetic complications, prothesis longevity, need for more surgery, MI, stroke, and even death. The patient understands and wishes to proceed with surgery.       Past Medical History:   Diagnosis Date    Breast cyst 1994    benign, removed    Deviated septum 04/2010    septoplasty    Diabetes mellitus     Type 2    GERD (gastroesophageal reflux disease)     severe    Hypertension     Hypothyroidism     Other elective surgery July 2008    toe surgery    Pure hypercholesterolemia     Stress thallium 09/30/2009    No evidence of ischemia or infarction; EF 78%; EKG portion negative with exercise capacity below average for age at 6 min of exercise    Varicose veins closure in right leg 2008 & left leg 2007    Venous reflux study 06/24/2014    No DVT. [facility] occlusions of bilateral GSV. Current Outpatient Prescriptions:     levoFLOXacin (LEVAQUIN) 500 mg tablet, 1 po q day x 5 days, Disp: 5 Tab, Rfl: 0    cetirizine (ZYRTEC) 10 mg tablet, Take 10 mg by mouth daily. , Disp: , Rfl:     metoprolol succinate (TOPROL-XL) 100 mg tablet, TAKE ONE TABLET BY MOUTH DAILY, Disp: 30 Tab, Rfl: 6    losartan (COZAAR) 100 mg tablet, Take 100 mg by mouth daily. , Disp: , Rfl:     escitalopram oxalate (LEXAPRO) 10 mg tablet, Take 10 mg by mouth daily. , Disp: , Rfl:     pantoprazole (PROTONIX) 40 mg tablet, Take 40 mg by mouth two (2) times a day., Disp: , Rfl:     metformin (GLUCOPHAGE) 500 mg tablet, Take 500 mg by mouth daily (with breakfast). , Disp: , Rfl:     levothyroxine (LEVOXYL) 88 mcg tablet, Take 88 mcg by mouth daily. , Disp: , Rfl:     lorazepam (ATIVAN) 1 mg tablet, Take 0.5 mg by mouth two (2) times a day., Disp: , Rfl:     ERGOCALCIFEROL, VITAMIN D2, (VITAMIN D PO), Take 50,000 Units by mouth every Monday., Disp: , Rfl:     Allergies   Allergen Reactions    Pcn [Penicillins] Unknown (comments)       Social History     Social History    Marital status:      Spouse name: N/A    Number of children: N/A    Years of education: N/A     Occupational History    Not on file.      Social History Main Topics    Smoking status: Never Smoker    Smokeless tobacco: Never Used    Alcohol use No    Drug use: No    Sexual activity: Not on file     Other Topics Concern    Not on file     Social History Narrative       Past Surgical History:   Procedure Laterality Date    CYSTOTOMY,EXCIS BLADDER TUMOR  2006    HX PARTIAL HYSTERECTOMY  1994    MI EGD DELIVER THERMAL ENERGY SPHNCTR/CARDIA GERD      THYROIDECTOMY  1992       PE:  Visit Vitals    /72    Pulse 65    Temp 97.7 °F (36.5 °C) (Oral)    Resp 18    Ht 5' 7\" (1.702 m)    Wt 228 lb (103.4 kg)    SpO2 95%    BMI 35.71 kg/m2     A&O X3, NAD, well develop, well nourished  Heart: S1-S2, rrr  Lungs: CTA bilat  Abd: soft, nt, nt, + bs in all quadrants  Ext:  Pos distal pulses to DP, PT      X-ray: right knee shows end stage OA    Labs: labs were reviewed and wnl.  ua pos, treated with levaquin    A:  Right  knee end stage OA    P:  At this point we will move forward with surgery. Again, the alternatives, risks, complications, as well as expected outcome were discussed and the patient wishes to proceed with surgery. Pt has been instructed to stop aspirin, nsaids, rheumatologic medications and blood thinners. They have also been instructed to continue on any heart and bp meds and to take them the morning of surgery with sips of water.          Macy Gan

## 2017-09-15 NOTE — TELEPHONE ENCOUNTER
Discussed with Dr. Candie Shayist in Dr. Gauri Watson absence. Patient is cleared for surgery.  Verbal order and read back per Kavya Rogers MD.

## 2017-09-16 ENCOUNTER — ANESTHESIA EVENT (OUTPATIENT)
Dept: SURGERY | Age: 67
DRG: 470 | End: 2017-09-16
Payer: COMMERCIAL

## 2017-09-18 ENCOUNTER — APPOINTMENT (OUTPATIENT)
Dept: GENERAL RADIOLOGY | Age: 67
DRG: 470 | End: 2017-09-18
Attending: ORTHOPAEDIC SURGERY
Payer: COMMERCIAL

## 2017-09-18 ENCOUNTER — ANESTHESIA (OUTPATIENT)
Dept: SURGERY | Age: 67
DRG: 470 | End: 2017-09-18
Payer: COMMERCIAL

## 2017-09-18 ENCOUNTER — HOSPITAL ENCOUNTER (INPATIENT)
Age: 67
LOS: 2 days | Discharge: HOME HEALTH | DRG: 470 | End: 2017-09-20
Attending: ORTHOPAEDIC SURGERY | Admitting: ORTHOPAEDIC SURGERY
Payer: COMMERCIAL

## 2017-09-18 DIAGNOSIS — M17.10 ARTHRITIS OF KNEE: Primary | ICD-10-CM

## 2017-09-18 LAB
APPEARANCE UR: CLEAR
BILIRUB UR QL: NEGATIVE
COLOR UR: YELLOW
EST. AVERAGE GLUCOSE BLD GHB EST-MCNC: 128 MG/DL
GLUCOSE BLD STRIP.AUTO-MCNC: 107 MG/DL (ref 70–110)
GLUCOSE BLD STRIP.AUTO-MCNC: 120 MG/DL (ref 70–110)
GLUCOSE BLD STRIP.AUTO-MCNC: 122 MG/DL (ref 70–110)
GLUCOSE BLD STRIP.AUTO-MCNC: 141 MG/DL (ref 70–110)
GLUCOSE UR STRIP.AUTO-MCNC: NEGATIVE MG/DL
HBA1C MFR BLD: 6.1 % (ref 4.2–5.6)
HGB UR QL STRIP: NEGATIVE
KETONES UR QL STRIP.AUTO: NEGATIVE MG/DL
LEUKOCYTE ESTERASE UR QL STRIP.AUTO: NEGATIVE
NITRITE UR QL STRIP.AUTO: NEGATIVE
PH UR STRIP: 5.5 [PH] (ref 5–8)
PROT UR STRIP-MCNC: NEGATIVE MG/DL
SP GR UR REFRACTOMETRY: 1.02 (ref 1–1.03)
UROBILINOGEN UR QL STRIP.AUTO: 0.2 EU/DL (ref 0.2–1)

## 2017-09-18 PROCEDURE — C1776 JOINT DEVICE (IMPLANTABLE): HCPCS | Performed by: ORTHOPAEDIC SURGERY

## 2017-09-18 PROCEDURE — 74011250636 HC RX REV CODE- 250/636

## 2017-09-18 PROCEDURE — 36415 COLL VENOUS BLD VENIPUNCTURE: CPT | Performed by: ORTHOPAEDIC SURGERY

## 2017-09-18 PROCEDURE — 74011250637 HC RX REV CODE- 250/637: Performed by: PHYSICIAN ASSISTANT

## 2017-09-18 PROCEDURE — 65270000029 HC RM PRIVATE

## 2017-09-18 PROCEDURE — 77030005538 HC CATH URETH FOL44 BARD -B

## 2017-09-18 PROCEDURE — 97161 PT EVAL LOW COMPLEX 20 MIN: CPT

## 2017-09-18 PROCEDURE — 77030003029 HC SUT VCRL J&J -B: Performed by: ORTHOPAEDIC SURGERY

## 2017-09-18 PROCEDURE — 77030010785: Performed by: ORTHOPAEDIC SURGERY

## 2017-09-18 PROCEDURE — 77030027138 HC INCENT SPIROMETER -A

## 2017-09-18 PROCEDURE — 74011000258 HC RX REV CODE- 258: Performed by: ORTHOPAEDIC SURGERY

## 2017-09-18 PROCEDURE — 74011000258 HC RX REV CODE- 258

## 2017-09-18 PROCEDURE — 76010000131 HC OR TIME 2 TO 2.5 HR: Performed by: ORTHOPAEDIC SURGERY

## 2017-09-18 PROCEDURE — 77030018719 HC DRSG PTCH ANTIMIC J&J -A: Performed by: ORTHOPAEDIC SURGERY

## 2017-09-18 PROCEDURE — 77030019557 HC ELECTRD VES SEAL MEDT -F: Performed by: ORTHOPAEDIC SURGERY

## 2017-09-18 PROCEDURE — 77030018883 HC BLD SAW SAG4 STRY -B: Performed by: ORTHOPAEDIC SURGERY

## 2017-09-18 PROCEDURE — 77030020782 HC GWN BAIR PAWS FLX 3M -B: Performed by: ORTHOPAEDIC SURGERY

## 2017-09-18 PROCEDURE — 76060000035 HC ANESTHESIA 2 TO 2.5 HR: Performed by: ORTHOPAEDIC SURGERY

## 2017-09-18 PROCEDURE — 97530 THERAPEUTIC ACTIVITIES: CPT

## 2017-09-18 PROCEDURE — 77030029434 HC STOCK ANTIEMB KNEE -A: Performed by: ORTHOPAEDIC SURGERY

## 2017-09-18 PROCEDURE — 82962 GLUCOSE BLOOD TEST: CPT

## 2017-09-18 PROCEDURE — 64447 NJX AA&/STRD FEMORAL NRV IMG: CPT | Performed by: ANESTHESIOLOGY

## 2017-09-18 PROCEDURE — 74011250636 HC RX REV CODE- 250/636: Performed by: PHYSICIAN ASSISTANT

## 2017-09-18 PROCEDURE — 74011250636 HC RX REV CODE- 250/636: Performed by: ORTHOPAEDIC SURGERY

## 2017-09-18 PROCEDURE — 74011000258 HC RX REV CODE- 258: Performed by: PHYSICIAN ASSISTANT

## 2017-09-18 PROCEDURE — 77030002933 HC SUT MCRYL J&J -A: Performed by: ORTHOPAEDIC SURGERY

## 2017-09-18 PROCEDURE — 77030013708 HC HNDPC SUC IRR PULS STRY –B: Performed by: ORTHOPAEDIC SURGERY

## 2017-09-18 PROCEDURE — 74011000250 HC RX REV CODE- 250: Performed by: PHYSICIAN ASSISTANT

## 2017-09-18 PROCEDURE — 81003 URINALYSIS AUTO W/O SCOPE: CPT | Performed by: ORTHOPAEDIC SURGERY

## 2017-09-18 PROCEDURE — 74011000250 HC RX REV CODE- 250

## 2017-09-18 PROCEDURE — 77030031139 HC SUT VCRL2 J&J -A: Performed by: ORTHOPAEDIC SURGERY

## 2017-09-18 PROCEDURE — 77030008683 HC TU ET CUF COVD -A: Performed by: ANESTHESIOLOGY

## 2017-09-18 PROCEDURE — 77030008467 HC STPLR SKN COVD -B: Performed by: ORTHOPAEDIC SURGERY

## 2017-09-18 PROCEDURE — 93005 ELECTROCARDIOGRAM TRACING: CPT

## 2017-09-18 PROCEDURE — 77030016544 HC BLD SAW RECIP1 STRY -B: Performed by: ORTHOPAEDIC SURGERY

## 2017-09-18 PROCEDURE — C1713 ANCHOR/SCREW BN/BN,TIS/BN: HCPCS | Performed by: ORTHOPAEDIC SURGERY

## 2017-09-18 PROCEDURE — 74011250636 HC RX REV CODE- 250/636: Performed by: NURSE ANESTHETIST, CERTIFIED REGISTERED

## 2017-09-18 PROCEDURE — 77030029494 HC CLD THER UNIT ICMN DJOR -B

## 2017-09-18 PROCEDURE — 74011000250 HC RX REV CODE- 250: Performed by: NURSE ANESTHETIST, CERTIFIED REGISTERED

## 2017-09-18 PROCEDURE — 74011250637 HC RX REV CODE- 250/637: Performed by: NURSE ANESTHETIST, CERTIFIED REGISTERED

## 2017-09-18 PROCEDURE — 77030012411 HC DRN WND CARD -A: Performed by: ORTHOPAEDIC SURGERY

## 2017-09-18 PROCEDURE — 74011000250 HC RX REV CODE- 250: Performed by: ORTHOPAEDIC SURGERY

## 2017-09-18 PROCEDURE — 73560 X-RAY EXAM OF KNEE 1 OR 2: CPT

## 2017-09-18 PROCEDURE — 77030016547 HC BLD SAW SAG1 STRY -B: Performed by: ORTHOPAEDIC SURGERY

## 2017-09-18 PROCEDURE — C9290 INJ, BUPIVACAINE LIPOSOME: HCPCS | Performed by: PHYSICIAN ASSISTANT

## 2017-09-18 PROCEDURE — 77030011640 HC PAD GRND REM COVD -A: Performed by: ORTHOPAEDIC SURGERY

## 2017-09-18 PROCEDURE — 77030018836 HC SOL IRR NACL ICUM -A: Performed by: ORTHOPAEDIC SURGERY

## 2017-09-18 PROCEDURE — 87086 URINE CULTURE/COLONY COUNT: CPT | Performed by: ORTHOPAEDIC SURGERY

## 2017-09-18 PROCEDURE — 77030003666 HC NDL SPINAL BD -A: Performed by: ORTHOPAEDIC SURGERY

## 2017-09-18 PROCEDURE — 83036 HEMOGLOBIN GLYCOSYLATED A1C: CPT | Performed by: ORTHOPAEDIC SURGERY

## 2017-09-18 PROCEDURE — 77030020753 HC CUF TRNQT 1BLA STRY -B: Performed by: ORTHOPAEDIC SURGERY

## 2017-09-18 PROCEDURE — 77030012935 HC DRSG AQUACEL BMS -B: Performed by: ORTHOPAEDIC SURGERY

## 2017-09-18 PROCEDURE — 74011250637 HC RX REV CODE- 250/637: Performed by: ORTHOPAEDIC SURGERY

## 2017-09-18 PROCEDURE — 76210000016 HC OR PH I REC 1 TO 1.5 HR: Performed by: ORTHOPAEDIC SURGERY

## 2017-09-18 PROCEDURE — 76942 ECHO GUIDE FOR BIOPSY: CPT | Performed by: ANESTHESIOLOGY

## 2017-09-18 DEVICE — PAT ASYM TRITHLON X3 32X10MM -- TRIATHLON ASYMMETRIC X3: Type: IMPLANTABLE DEVICE | Site: KNEE | Status: FUNCTIONAL

## 2017-09-18 DEVICE — COMPONENT KNEE CEM X3 TRIATHLON: Type: IMPLANTABLE DEVICE | Site: KNEE | Status: FUNCTIONAL

## 2017-09-18 DEVICE — CEMENT BNE 20ML 41GM FULL DOSE PMMA W/ TOBRA M VISC RADPQ: Type: IMPLANTABLE DEVICE | Site: KNEE | Status: FUNCTIONAL

## 2017-09-18 DEVICE — COMPNT FEM POST STBL 5 R --: Type: IMPLANTABLE DEVICE | Site: KNEE | Status: FUNCTIONAL

## 2017-09-18 DEVICE — INSERT TIB PS TRIATHLN X3 5 11 --: Type: IMPLANTABLE DEVICE | Site: KNEE | Status: FUNCTIONAL

## 2017-09-18 DEVICE — BASEPLT TIB UNIV TRIATHLN 5 --: Type: IMPLANTABLE DEVICE | Site: KNEE | Status: FUNCTIONAL

## 2017-09-18 RX ORDER — KETOROLAC TROMETHAMINE 30 MG/ML
INJECTION, SOLUTION INTRAMUSCULAR; INTRAVENOUS AS NEEDED
Status: DISCONTINUED | OUTPATIENT
Start: 2017-09-18 | End: 2017-09-18 | Stop reason: HOSPADM

## 2017-09-18 RX ORDER — MIDAZOLAM HYDROCHLORIDE 1 MG/ML
2 INJECTION, SOLUTION INTRAMUSCULAR; INTRAVENOUS ONCE
Status: COMPLETED | OUTPATIENT
Start: 2017-09-18 | End: 2017-09-18

## 2017-09-18 RX ORDER — DIPHENHYDRAMINE HYDROCHLORIDE 50 MG/ML
12.5 INJECTION, SOLUTION INTRAMUSCULAR; INTRAVENOUS
Status: DISCONTINUED | OUTPATIENT
Start: 2017-09-18 | End: 2017-09-20 | Stop reason: HOSPADM

## 2017-09-18 RX ORDER — ROPIVACAINE HYDROCHLORIDE 2 MG/ML
30 INJECTION, SOLUTION EPIDURAL; INFILTRATION; PERINEURAL
Status: COMPLETED | OUTPATIENT
Start: 2017-09-18 | End: 2017-09-18

## 2017-09-18 RX ORDER — INSULIN LISPRO 100 [IU]/ML
INJECTION, SOLUTION INTRAVENOUS; SUBCUTANEOUS ONCE
Status: DISCONTINUED | OUTPATIENT
Start: 2017-09-18 | End: 2017-09-18 | Stop reason: HOSPADM

## 2017-09-18 RX ORDER — SODIUM CHLORIDE, SODIUM LACTATE, POTASSIUM CHLORIDE, CALCIUM CHLORIDE 600; 310; 30; 20 MG/100ML; MG/100ML; MG/100ML; MG/100ML
75 INJECTION, SOLUTION INTRAVENOUS CONTINUOUS
Status: DISCONTINUED | OUTPATIENT
Start: 2017-09-18 | End: 2017-09-18 | Stop reason: HOSPADM

## 2017-09-18 RX ORDER — LEVOTHYROXINE SODIUM 88 UG/1
88 TABLET ORAL
Status: DISCONTINUED | OUTPATIENT
Start: 2017-09-19 | End: 2017-09-20 | Stop reason: HOSPADM

## 2017-09-18 RX ORDER — FENTANYL CITRATE 50 UG/ML
100 INJECTION, SOLUTION INTRAMUSCULAR; INTRAVENOUS ONCE
Status: COMPLETED | OUTPATIENT
Start: 2017-09-18 | End: 2017-09-18

## 2017-09-18 RX ORDER — OXYCODONE HCL 20 MG/1
20 TABLET, FILM COATED, EXTENDED RELEASE ORAL EVERY 12 HOURS
Status: DISCONTINUED | OUTPATIENT
Start: 2017-09-18 | End: 2017-09-20 | Stop reason: HOSPADM

## 2017-09-18 RX ORDER — LIDOCAINE HYDROCHLORIDE 10 MG/ML
3 INJECTION, SOLUTION EPIDURAL; INFILTRATION; INTRACAUDAL; PERINEURAL ONCE
Status: COMPLETED | OUTPATIENT
Start: 2017-09-18 | End: 2017-09-18

## 2017-09-18 RX ORDER — SODIUM CHLORIDE 0.9 % (FLUSH) 0.9 %
5-10 SYRINGE (ML) INJECTION AS NEEDED
Status: DISCONTINUED | OUTPATIENT
Start: 2017-09-18 | End: 2017-09-18 | Stop reason: HOSPADM

## 2017-09-18 RX ORDER — SODIUM CHLORIDE 0.9 % (FLUSH) 0.9 %
5-10 SYRINGE (ML) INJECTION EVERY 8 HOURS
Status: DISCONTINUED | OUTPATIENT
Start: 2017-09-18 | End: 2017-09-20 | Stop reason: HOSPADM

## 2017-09-18 RX ORDER — PREGABALIN 75 MG/1
75 CAPSULE ORAL ONCE
Status: COMPLETED | OUTPATIENT
Start: 2017-09-18 | End: 2017-09-18

## 2017-09-18 RX ORDER — SUCCINYLCHOLINE CHLORIDE 20 MG/ML
INJECTION INTRAMUSCULAR; INTRAVENOUS AS NEEDED
Status: DISCONTINUED | OUTPATIENT
Start: 2017-09-18 | End: 2017-09-18 | Stop reason: HOSPADM

## 2017-09-18 RX ORDER — ACETAMINOPHEN 500 MG
1000 TABLET ORAL ONCE
Status: COMPLETED | OUTPATIENT
Start: 2017-09-18 | End: 2017-09-18

## 2017-09-18 RX ORDER — NEOSTIGMINE METHYLSULFATE 5 MG/5 ML
SYRINGE (ML) INTRAVENOUS AS NEEDED
Status: DISCONTINUED | OUTPATIENT
Start: 2017-09-18 | End: 2017-09-18 | Stop reason: HOSPADM

## 2017-09-18 RX ORDER — DIPHENHYDRAMINE HYDROCHLORIDE 50 MG/ML
12.5 INJECTION, SOLUTION INTRAMUSCULAR; INTRAVENOUS
Status: DISCONTINUED | OUTPATIENT
Start: 2017-09-18 | End: 2017-09-18 | Stop reason: HOSPADM

## 2017-09-18 RX ORDER — DOCUSATE SODIUM 100 MG/1
100 CAPSULE, LIQUID FILLED ORAL 2 TIMES DAILY
Status: DISCONTINUED | OUTPATIENT
Start: 2017-09-18 | End: 2017-09-20 | Stop reason: HOSPADM

## 2017-09-18 RX ORDER — ESCITALOPRAM OXALATE 10 MG/1
10 TABLET ORAL DAILY
Status: DISCONTINUED | OUTPATIENT
Start: 2017-09-18 | End: 2017-09-19

## 2017-09-18 RX ORDER — POLYMYXIN B 500000 [USP'U]/1
INJECTION, POWDER, LYOPHILIZED, FOR SOLUTION INTRAMUSCULAR; INTRATHECAL; INTRAVENOUS; OPHTHALMIC AS NEEDED
Status: DISCONTINUED | OUTPATIENT
Start: 2017-09-18 | End: 2017-09-18 | Stop reason: HOSPADM

## 2017-09-18 RX ORDER — LANOLIN ALCOHOL/MO/W.PET/CERES
1 CREAM (GRAM) TOPICAL 2 TIMES DAILY WITH MEALS
Status: DISCONTINUED | OUTPATIENT
Start: 2017-09-18 | End: 2017-09-20 | Stop reason: HOSPADM

## 2017-09-18 RX ORDER — SODIUM CHLORIDE 9 MG/ML
100 INJECTION, SOLUTION INTRAVENOUS CONTINUOUS
Status: DISCONTINUED | OUTPATIENT
Start: 2017-09-18 | End: 2017-09-19

## 2017-09-18 RX ORDER — VANCOMYCIN HYDROCHLORIDE 1 G/20ML
INJECTION, POWDER, LYOPHILIZED, FOR SOLUTION INTRAVENOUS AS NEEDED
Status: DISCONTINUED | OUTPATIENT
Start: 2017-09-18 | End: 2017-09-18 | Stop reason: HOSPADM

## 2017-09-18 RX ORDER — ONDANSETRON 2 MG/ML
4 INJECTION INTRAMUSCULAR; INTRAVENOUS ONCE
Status: DISCONTINUED | OUTPATIENT
Start: 2017-09-18 | End: 2017-09-18 | Stop reason: HOSPADM

## 2017-09-18 RX ORDER — LIDOCAINE HYDROCHLORIDE 20 MG/ML
INJECTION, SOLUTION EPIDURAL; INFILTRATION; INTRACAUDAL; PERINEURAL AS NEEDED
Status: DISCONTINUED | OUTPATIENT
Start: 2017-09-18 | End: 2017-09-18 | Stop reason: HOSPADM

## 2017-09-18 RX ORDER — MAGNESIUM SULFATE 100 %
4 CRYSTALS MISCELLANEOUS AS NEEDED
Status: DISCONTINUED | OUTPATIENT
Start: 2017-09-18 | End: 2017-09-18 | Stop reason: HOSPADM

## 2017-09-18 RX ORDER — DEXTROSE 50 % IN WATER (D50W) INTRAVENOUS SYRINGE
25-50 AS NEEDED
Status: DISCONTINUED | OUTPATIENT
Start: 2017-09-18 | End: 2017-09-18 | Stop reason: HOSPADM

## 2017-09-18 RX ORDER — DEXTROSE 50 % IN WATER (D50W) INTRAVENOUS SYRINGE
25-50 AS NEEDED
Status: DISCONTINUED | OUTPATIENT
Start: 2017-09-18 | End: 2017-09-20 | Stop reason: HOSPADM

## 2017-09-18 RX ORDER — ONDANSETRON 2 MG/ML
INJECTION INTRAMUSCULAR; INTRAVENOUS AS NEEDED
Status: DISCONTINUED | OUTPATIENT
Start: 2017-09-18 | End: 2017-09-18 | Stop reason: HOSPADM

## 2017-09-18 RX ORDER — MAGNESIUM SULFATE 100 %
4 CRYSTALS MISCELLANEOUS AS NEEDED
Status: DISCONTINUED | OUTPATIENT
Start: 2017-09-18 | End: 2017-09-20 | Stop reason: HOSPADM

## 2017-09-18 RX ORDER — LORAZEPAM 0.5 MG/1
0.5 TABLET ORAL 2 TIMES DAILY
Status: DISCONTINUED | OUTPATIENT
Start: 2017-09-18 | End: 2017-09-19

## 2017-09-18 RX ORDER — OXYCODONE HCL 20 MG/1
20 TABLET, FILM COATED, EXTENDED RELEASE ORAL ONCE
Status: COMPLETED | OUTPATIENT
Start: 2017-09-18 | End: 2017-09-18

## 2017-09-18 RX ORDER — FAMOTIDINE 20 MG/1
20 TABLET, FILM COATED ORAL ONCE
Status: COMPLETED | OUTPATIENT
Start: 2017-09-18 | End: 2017-09-18

## 2017-09-18 RX ORDER — FENTANYL CITRATE 50 UG/ML
INJECTION, SOLUTION INTRAMUSCULAR; INTRAVENOUS AS NEEDED
Status: DISCONTINUED | OUTPATIENT
Start: 2017-09-18 | End: 2017-09-18 | Stop reason: HOSPADM

## 2017-09-18 RX ORDER — METOPROLOL SUCCINATE 100 MG/1
100 TABLET, EXTENDED RELEASE ORAL DAILY
Status: DISCONTINUED | OUTPATIENT
Start: 2017-09-19 | End: 2017-09-20 | Stop reason: HOSPADM

## 2017-09-18 RX ORDER — CELECOXIB 400 MG/1
400 CAPSULE ORAL ONCE
Status: COMPLETED | OUTPATIENT
Start: 2017-09-18 | End: 2017-09-18

## 2017-09-18 RX ORDER — HYDROMORPHONE HYDROCHLORIDE 2 MG/ML
0.5 INJECTION, SOLUTION INTRAMUSCULAR; INTRAVENOUS; SUBCUTANEOUS
Status: DISCONTINUED | OUTPATIENT
Start: 2017-09-18 | End: 2017-09-18

## 2017-09-18 RX ORDER — ONDANSETRON 2 MG/ML
4 INJECTION INTRAMUSCULAR; INTRAVENOUS
Status: DISCONTINUED | OUTPATIENT
Start: 2017-09-18 | End: 2017-09-20 | Stop reason: HOSPADM

## 2017-09-18 RX ORDER — WARFARIN 10 MG/1
10 TABLET ORAL ONCE
Status: COMPLETED | OUTPATIENT
Start: 2017-09-18 | End: 2017-09-18

## 2017-09-18 RX ORDER — SODIUM CHLORIDE 0.9 % (FLUSH) 0.9 %
5-10 SYRINGE (ML) INJECTION AS NEEDED
Status: DISCONTINUED | OUTPATIENT
Start: 2017-09-18 | End: 2017-09-20 | Stop reason: HOSPADM

## 2017-09-18 RX ORDER — SODIUM CHLORIDE 0.9 % (FLUSH) 0.9 %
5-10 SYRINGE (ML) INJECTION EVERY 8 HOURS
Status: DISCONTINUED | OUTPATIENT
Start: 2017-09-18 | End: 2017-09-18 | Stop reason: HOSPADM

## 2017-09-18 RX ORDER — OXYCODONE AND ACETAMINOPHEN 7.5; 325 MG/1; MG/1
1-2 TABLET ORAL
Status: DISCONTINUED | OUTPATIENT
Start: 2017-09-18 | End: 2017-09-20 | Stop reason: HOSPADM

## 2017-09-18 RX ORDER — NALBUPHINE HYDROCHLORIDE 10 MG/ML
5 INJECTION, SOLUTION INTRAMUSCULAR; INTRAVENOUS; SUBCUTANEOUS
Status: DISCONTINUED | OUTPATIENT
Start: 2017-09-18 | End: 2017-09-18 | Stop reason: HOSPADM

## 2017-09-18 RX ORDER — PANTOPRAZOLE SODIUM 40 MG/1
40 TABLET, DELAYED RELEASE ORAL 2 TIMES DAILY
Status: DISCONTINUED | OUTPATIENT
Start: 2017-09-18 | End: 2017-09-20 | Stop reason: HOSPADM

## 2017-09-18 RX ORDER — PREGABALIN 50 MG/1
50 CAPSULE ORAL 2 TIMES DAILY
Status: DISCONTINUED | OUTPATIENT
Start: 2017-09-18 | End: 2017-09-20 | Stop reason: HOSPADM

## 2017-09-18 RX ORDER — GLYCOPYRROLATE 0.2 MG/ML
INJECTION INTRAMUSCULAR; INTRAVENOUS AS NEEDED
Status: DISCONTINUED | OUTPATIENT
Start: 2017-09-18 | End: 2017-09-18 | Stop reason: HOSPADM

## 2017-09-18 RX ORDER — ROCURONIUM BROMIDE 10 MG/ML
INJECTION, SOLUTION INTRAVENOUS AS NEEDED
Status: DISCONTINUED | OUTPATIENT
Start: 2017-09-18 | End: 2017-09-18 | Stop reason: HOSPADM

## 2017-09-18 RX ORDER — DEXAMETHASONE SODIUM PHOSPHATE 4 MG/ML
INJECTION, SOLUTION INTRA-ARTICULAR; INTRALESIONAL; INTRAMUSCULAR; INTRAVENOUS; SOFT TISSUE AS NEEDED
Status: DISCONTINUED | OUTPATIENT
Start: 2017-09-18 | End: 2017-09-18 | Stop reason: HOSPADM

## 2017-09-18 RX ORDER — INSULIN LISPRO 100 [IU]/ML
INJECTION, SOLUTION INTRAVENOUS; SUBCUTANEOUS
Status: DISCONTINUED | OUTPATIENT
Start: 2017-09-18 | End: 2017-09-20 | Stop reason: HOSPADM

## 2017-09-18 RX ORDER — ZOLPIDEM TARTRATE 5 MG/1
5 TABLET ORAL
Status: DISCONTINUED | OUTPATIENT
Start: 2017-09-18 | End: 2017-09-20 | Stop reason: HOSPADM

## 2017-09-18 RX ORDER — CELECOXIB 100 MG/1
200 CAPSULE ORAL 2 TIMES DAILY
Status: DISCONTINUED | OUTPATIENT
Start: 2017-09-18 | End: 2017-09-20 | Stop reason: HOSPADM

## 2017-09-18 RX ORDER — BUPIVACAINE HYDROCHLORIDE 5 MG/ML
INJECTION, SOLUTION EPIDURAL; INTRACAUDAL AS NEEDED
Status: DISCONTINUED | OUTPATIENT
Start: 2017-09-18 | End: 2017-09-18 | Stop reason: HOSPADM

## 2017-09-18 RX ORDER — LOSARTAN POTASSIUM 50 MG/1
100 TABLET ORAL DAILY
Status: DISCONTINUED | OUTPATIENT
Start: 2017-09-18 | End: 2017-09-20 | Stop reason: HOSPADM

## 2017-09-18 RX ORDER — EPINEPHRINE 1 MG/ML
INJECTION, SOLUTION, CONCENTRATE INTRAVENOUS AS NEEDED
Status: DISCONTINUED | OUTPATIENT
Start: 2017-09-18 | End: 2017-09-18 | Stop reason: HOSPADM

## 2017-09-18 RX ORDER — PROPOFOL 10 MG/ML
INJECTION, EMULSION INTRAVENOUS AS NEEDED
Status: DISCONTINUED | OUTPATIENT
Start: 2017-09-18 | End: 2017-09-18 | Stop reason: HOSPADM

## 2017-09-18 RX ORDER — NALOXONE HYDROCHLORIDE 0.4 MG/ML
0.4 INJECTION, SOLUTION INTRAMUSCULAR; INTRAVENOUS; SUBCUTANEOUS AS NEEDED
Status: DISCONTINUED | OUTPATIENT
Start: 2017-09-18 | End: 2017-09-20 | Stop reason: HOSPADM

## 2017-09-18 RX ADMIN — OXYCODONE HYDROCHLORIDE 20 MG: 20 TABLET, FILM COATED, EXTENDED RELEASE ORAL at 06:45

## 2017-09-18 RX ADMIN — ONDANSETRON 4 MG: 2 INJECTION INTRAMUSCULAR; INTRAVENOUS at 07:29

## 2017-09-18 RX ADMIN — PANTOPRAZOLE SODIUM 40 MG: 40 TABLET, DELAYED RELEASE ORAL at 17:48

## 2017-09-18 RX ADMIN — PROPOFOL 150 MG: 10 INJECTION, EMULSION INTRAVENOUS at 07:29

## 2017-09-18 RX ADMIN — SODIUM CHLORIDE 100 ML/HR: 900 INJECTION, SOLUTION INTRAVENOUS at 23:11

## 2017-09-18 RX ADMIN — FENTANYL CITRATE 250 MCG: 50 INJECTION, SOLUTION INTRAMUSCULAR; INTRAVENOUS at 07:29

## 2017-09-18 RX ADMIN — Medication 325 MG: at 16:27

## 2017-09-18 RX ADMIN — Medication 10 ML: at 20:07

## 2017-09-18 RX ADMIN — Medication 3 MG: at 09:32

## 2017-09-18 RX ADMIN — ACETAMINOPHEN 1000 MG: 500 TABLET ORAL at 06:46

## 2017-09-18 RX ADMIN — SODIUM CHLORIDE, SODIUM LACTATE, POTASSIUM CHLORIDE, AND CALCIUM CHLORIDE 75 ML/HR: 600; 310; 30; 20 INJECTION, SOLUTION INTRAVENOUS at 06:20

## 2017-09-18 RX ADMIN — ROPIVACAINE HYDROCHLORIDE 60 MG: 2 INJECTION, SOLUTION EPIDURAL; INFILTRATION at 07:12

## 2017-09-18 RX ADMIN — LIDOCAINE HYDROCHLORIDE 20 MG: 20 INJECTION, SOLUTION EPIDURAL; INFILTRATION; INTRACAUDAL; PERINEURAL at 07:29

## 2017-09-18 RX ADMIN — SODIUM CHLORIDE 600 MG: 900 INJECTION, SOLUTION INTRAVENOUS at 23:11

## 2017-09-18 RX ADMIN — SODIUM CHLORIDE 100 ML/HR: 900 INJECTION, SOLUTION INTRAVENOUS at 11:25

## 2017-09-18 RX ADMIN — TRANEXAMIC ACID 1 G: 100 INJECTION, SOLUTION INTRAVENOUS at 08:53

## 2017-09-18 RX ADMIN — SODIUM CHLORIDE 600 MG: 900 INJECTION, SOLUTION INTRAVENOUS at 16:27

## 2017-09-18 RX ADMIN — PREGABALIN 75 MG: 75 CAPSULE ORAL at 06:45

## 2017-09-18 RX ADMIN — MIDAZOLAM HYDROCHLORIDE 2 MG: 1 INJECTION, SOLUTION INTRAMUSCULAR; INTRAVENOUS at 07:07

## 2017-09-18 RX ADMIN — FAMOTIDINE 20 MG: 20 TABLET ORAL at 06:46

## 2017-09-18 RX ADMIN — CELECOXIB 400 MG: 400 CAPSULE ORAL at 06:45

## 2017-09-18 RX ADMIN — LORAZEPAM 0.5 MG: 0.5 TABLET ORAL at 17:49

## 2017-09-18 RX ADMIN — SODIUM CHLORIDE, SODIUM LACTATE, POTASSIUM CHLORIDE, AND CALCIUM CHLORIDE: 600; 310; 30; 20 INJECTION, SOLUTION INTRAVENOUS at 08:08

## 2017-09-18 RX ADMIN — CELECOXIB 200 MG: 100 CAPSULE ORAL at 17:48

## 2017-09-18 RX ADMIN — GLYCOPYRROLATE 0.4 MG: 0.2 INJECTION INTRAMUSCULAR; INTRAVENOUS at 09:32

## 2017-09-18 RX ADMIN — SODIUM CHLORIDE 600 MG: 900 INJECTION, SOLUTION INTRAVENOUS at 07:25

## 2017-09-18 RX ADMIN — TRANEXAMIC ACID 1 G: 100 INJECTION, SOLUTION INTRAVENOUS at 07:37

## 2017-09-18 RX ADMIN — Medication 10 ML: at 15:31

## 2017-09-18 RX ADMIN — DEXAMETHASONE SODIUM PHOSPHATE 4 MG: 4 INJECTION, SOLUTION INTRA-ARTICULAR; INTRALESIONAL; INTRAMUSCULAR; INTRAVENOUS; SOFT TISSUE at 07:29

## 2017-09-18 RX ADMIN — ONDANSETRON 4 MG: 2 INJECTION, SOLUTION INTRAMUSCULAR; INTRAVENOUS at 20:06

## 2017-09-18 RX ADMIN — WARFARIN SODIUM 10 MG: 10 TABLET ORAL at 06:46

## 2017-09-18 RX ADMIN — DOCUSATE SODIUM 100 MG: 100 CAPSULE, LIQUID FILLED ORAL at 17:48

## 2017-09-18 RX ADMIN — ROCURONIUM BROMIDE 10 MG: 10 INJECTION, SOLUTION INTRAVENOUS at 07:29

## 2017-09-18 RX ADMIN — LIDOCAINE HYDROCHLORIDE 3 ML: 10 INJECTION, SOLUTION EPIDURAL; INFILTRATION; INTRACAUDAL; PERINEURAL at 07:09

## 2017-09-18 RX ADMIN — FENTANYL CITRATE 100 MCG: 50 INJECTION INTRAMUSCULAR; INTRAVENOUS at 07:07

## 2017-09-18 RX ADMIN — SUCCINYLCHOLINE CHLORIDE 100 MG: 20 INJECTION INTRAMUSCULAR; INTRAVENOUS at 07:29

## 2017-09-18 RX ADMIN — PREGABALIN 50 MG: 50 CAPSULE ORAL at 17:49

## 2017-09-18 NOTE — CONSULTS
[facility]  Pulmonary, Critical Care, and Sleep Medicine      Name: Kayla Pena MRN: [id number]   : 1950 Hospital: [address] [place] [address] Dr   Date: 2017          Initial Patient Consult    Requesting MD: Dr. Yoo Breaker:   · Osteoarthritis of right knee, s/p right Total Knee Replacement (Day 0)  · Bradycardia-asymptomatic, possibly secondary to anesthesia and metoprolol prior to surgery  · Hx hypothyroidism s/p thyroidectomy; DM  · Hx HTN  · Hx GERD      RECOMMENDATIONS:   Encourage incentive spirometry; mobilize once ok with Ortho. Actively wean oxygen supplementation to d/c, SpO2 goal >92%  Received elaine-operative antibiotics - clindamycin (with end date 17). Monitor for fevers, leukocytosis   Hemodynamically stable however with bradycardia post-operatively - EKG now; OP metoprolol in AM with holding parameters (Hold if HR is 60 and below, or SBP < 105 mmHg)  Start OP levothyroxine in am  Continue IVF hydration and d/c santo am  Stool softener given narcotic use  Diet ordered  Pain management and anticoagulation per orthopedic service  Wound care  Labs: CBC, BMP, Mg, Phos in am  GI proph: protonix for symptomatic GERD  Will follow from medicine perspective       Subjective/History: This patient has been seen and evaluated at the request of Dr. Torie Poole following total knee replacement  Patient is a 61 y.o. female with hx of DM, GERD, HTN, hypothyroidism, osteoarthritis, who has been seen by Ortho service for right knee pain and has failed conservative medical management for OA of right knee. Pt underwent scheduled TKR today under general and regional (femoral block) anesthesia. Pt was extubated and transferred to . Pt's right knee has surgical dressing with cooling device over knee; also has right knee drain. Pt is examined at bedside - she is easily awakened however feels sleepy.  Denies any lightheadedness, dizziness, chest pain, abdominal pain, nausea, vomiting. States she took metoprolol prior to her scheduled surgery today. Past Medical History:   Diagnosis Date    Breast cyst 1994    benign, removed    Deviated septum 04/2010    septoplasty    Diabetes mellitus     Type 2    GERD (gastroesophageal reflux disease)     severe    Hypertension     Hypothyroidism     Other elective surgery July 2008    toe surgery    Pure hypercholesterolemia     Stress thallium 09/30/2009    No evidence of ischemia or infarction; EF 78%; EKG portion negative with exercise capacity below average for age at 6 min of exercise    Varicose veins     closure in right leg 2008 & left leg 2007    Venous reflux study 06/24/2014    No DVT. [facility] occlusions of bilateral GSV. Past Surgical History:   Procedure Laterality Date    CYSTOTOMY,EXCIS BLADDER TUMOR  2006    HX HYSTERECTOMY      HX PARTIAL HYSTERECTOMY  1994    LA EGD DELIVER THERMAL ENERGY SPHNCTR/CARDIA GERD      THYROIDECTOMY  1992      Prior to Admission medications    Medication Sig Start Date End Date Taking? Authorizing Provider   levoFLOXacin (LEVAQUIN) 500 mg tablet 1 po q day x 5 days 9/13/17  Yes Oralee NORRIS Griffith   cetirizine (ZYRTEC) 10 mg tablet Take 10 mg by mouth daily. Yes Historical Provider   metoprolol succinate (TOPROL-XL) 100 mg tablet TAKE ONE TABLET BY MOUTH DAILY 4/1/17  Yes Etha Fairly, DO   losartan (COZAAR) 100 mg tablet Take 100 mg by mouth daily. Yes Historical Provider   escitalopram oxalate (LEXAPRO) 10 mg tablet Take 10 mg by mouth daily. Yes Historical Provider   pantoprazole (PROTONIX) 40 mg tablet Take 40 mg by mouth two (2) times a day. 6/8/14  Yes Historical Provider   metformin (GLUCOPHAGE) 500 mg tablet Take 500 mg by mouth daily (with breakfast). Yes Historical Provider   levothyroxine (LEVOXYL) 88 mcg tablet Take 88 mcg by mouth daily.    Yes Historical Provider   lorazepam (ATIVAN) 1 mg tablet Take 0.5 mg by mouth two (2) times a day. Yes Historical Provider   ERGOCALCIFEROL, VITAMIN D2, (VITAMIN D PO) Take 50,000 Units by mouth every Monday. Yes Historical Provider     Current Facility-Administered Medications   Medication Dose Route Frequency    escitalopram oxalate (LEXAPRO) tablet 10 mg  10 mg Oral DAILY    LORazepam (ATIVAN) tablet 0.5 mg  0.5 mg Oral BID    losartan (COZAAR) tablet 100 mg  100 mg Oral DAILY    [START ON 9/19/2017] metoprolol succinate (TOPROL-XL) tablet 100 mg  100 mg Oral DAILY    pantoprazole (PROTONIX) tablet 40 mg  40 mg Oral BID    0.9% sodium chloride infusion  100 mL/hr IntraVENous CONTINUOUS    sodium chloride (NS) flush 5-10 mL  5-10 mL IntraVENous Q8H    ferrous sulfate tablet 325 mg  1 Tab Oral BID WITH MEALS    celecoxib (CELEBREX) capsule 200 mg  200 mg Oral BID    clindamycin phosphate (CLEOCIN) 600 mg in 0.9% sodium chloride (MBP/ADV) 100 mL ADV  600 mg IntraVENous Q8H    docusate sodium (COLACE) capsule 100 mg  100 mg Oral BID    pregabalin (LYRICA) capsule 50 mg  50 mg Oral BID    oxyCODONE ER (OxyCONTIN) tablet 20 mg  20 mg Oral Q12H    [START ON 9/19/2017] WARFARIN INFORMATION NOTE (COUMADIN)   Other Q24H    insulin lispro (HUMALOG) injection   SubCUTAneous AC&HS    [START ON 9/19/2017] levothyroxine (SYNTHROID) tablet 88 mcg  88 mcg Oral ACB     Allergies   Allergen Reactions    Pcn [Penicillins] Unknown (comments)      Social History   Substance Use Topics    Smoking status: Never Smoker    Smokeless tobacco: Never Used    Alcohol use No      Family History   Problem Relation Age of Onset    Heart Attack Maternal Grandmother     Hypertension Mother         Review of Systems:  A comprehensive review of systems was negative except for that written in the HPI.     Objective:   Vital Signs:    Visit Vitals    /83 (BP 1 Location: Right arm, BP Patient Position: At rest)    Pulse (!) 54    Temp 97.5 °F (36.4 °C)    Resp 12    Ht 5' 7\" (1.702 m)  Wt 100 kg (220 lb 6 oz)    SpO2 97%    BMI 34.52 kg/m2       O2 Device: Nasal cannula   O2 Flow Rate (L/min): 2 l/min   Temp (24hrs), Av.9 °F (36.6 °C), Min:97.5 °F (36.4 °C), Max:98.5 °F (36.9 °C)       Intake/Output:   Last shift:       0701 -  1900  In: 3500 [I.V.:3500]  Out: 100   Last 3 shifts:      Intake/Output Summary (Last 24 hours) at 17 1416  Last data filed at 17 0952   Gross per 24 hour   Intake             3500 ml   Output              100 ml   Net             3400 ml         Physical Exam:    General:  Alert, cooperative, no distress, appears stated age. Head:  Normocephalic, without obvious abnormality, atraumatic. Eyes:  Conjunctivae/corneas clear. PERRL, EOMs intact. Nose: Nares normal. No drainage    Throat: Lips, mucosa, and tongue normal. Teeth and gums normal.   Neck: Supple, symmetrical, trachea midline, no adenopathy, thyroid: no enlargment/tenderness/nodules, no carotid bruit and no JVD. Back:   Symmetric, no curvature. ROM normal.   Lungs:   Clear to auscultation bilaterally. Chest wall:  No tenderness or deformity. Heart:  Regular rate and rhythm, S1, S2 normal   Abdomen:   Soft, non-tender. Bowel sounds normal.    Extremities: R knee with surgical dressing - no gross bleeding; + wound drain to right knee; LLE no edema/cyanosis- Hemovac in place and cooling bandage also in place over surgical site   Pulses: 2+ and symmetric all extremities.    Skin: Skin color, texture, turgor normal. No rashes or lesions   Lymph nodes: Cervical, supraclavicular nodes normal.   Neurologic: Grossly nonfocal       Data:     Recent Results (from the past 24 hour(s))   GLUCOSE, POC    Collection Time: 17  6:16 AM   Result Value Ref Range    Glucose (POC) 107 70 - 110 mg/dL   GLUCOSE, POC    Collection Time: 17 10:09 AM   Result Value Ref Range    Glucose (POC) 141 (H) 70 - 110 mg/dL   HEMOGLOBIN A1C WITH EAG    Collection Time: 17  1:10 PM   Result Value Ref Range    Hemoglobin A1c 6.1 (H) 4.2 - 5.6 %    Est. average glucose 128 mg/dL             Imaging: no new imaging         Yakov Alvarado PA-C      [facility] Staff Addendum     I have independently evaluated the patient and reviewed the patient's chart. I have discussed the findings and care plan with NORRIS Iqbal    I agree with the above evaluation, assessment and recommendations along with the following comments and observations. At the time of my evaluation the patient was resting in matthews bed. She states that she is a little light-headed but no chest pain or dyspnea. She denies any leg pain and that she still can't fully feel her right leg yet. She is tolerating PO. No nausea or emesis. Family is in the room. Bradycardia noted.        Clinical Care and time spent coordinating care, minus procedure time: 35  min    Toni Gray DO, [facility]  Pulmonary, Sleep and Critical Care Medicine  3:30 PM

## 2017-09-18 NOTE — PROGRESS NOTES
Bedside and Verbal shift change report given to Sandra Lucas RN  (oncoming nurse) by Herbert Paulson (offgoing nurse). Report included the following information SBAR, Kardex, MAR and Recent Results. SITUATION:    Code Status: No Order   Reason for Admission: Osteoarthritis of right knee, unspecified osteoarthritis type 200 Devon Rogers day: 0   Problem List:       Hospital Problems  Date Reviewed: 9/16/2017          Codes Class Noted POA    Arthritis of knee ICD-10-CM: M17.10  ICD-9-CM: 716.96  9/18/2017 Unknown              BACKGROUND:    Past Medical History:   Past Medical History:   Diagnosis Date    Breast cyst 1994    benign, removed    Deviated septum 04/2010    septoplasty    Diabetes mellitus     Type 2    GERD (gastroesophageal reflux disease)     severe    Hypertension     Hypothyroidism     Other elective surgery July 2008    toe surgery    Pure hypercholesterolemia     Stress thallium 09/30/2009    No evidence of ischemia or infarction; EF 78%; EKG portion negative with exercise capacity below average for age at 6 min of exercise    Varicose veins     closure in right leg 2008 & left leg 2007    Venous reflux study 06/24/2014    No DVT. [facility] occlusions of bilateral GSV. Patient taking anticoagulants yes     ASSESSMENT:    Changes in Assessment Throughout Shift: no     Patient has Central Line: no Reasons if yes: .    Patient has Caal Cath: yes Reasons if yes: urinary retention      Last Vitals:     Vitals:    09/18/17 1032 09/18/17 1037 09/18/17 1111 09/18/17 1550   BP: 125/86 120/72 133/83 131/70   Pulse: (!) 53 (!) 53 (!) 54 62   Resp: 14 12 12 14   Temp:   97.5 °F (36.4 °C) 97.6 °F (36.4 °C)   SpO2: 96% 96% 97% 99%   Weight:       Height:            IV and DRAINS (will only show if present)   Peripheral IV 09/18/17 Left Hand-Site Assessment: Clean, dry, & intact  Samuel-Walker Drain 09/18/17 Right Knee-Site Assessment: Clean, dry, & intact     WOUND (if present)   Wound Type:  none   Dressing present Dressing Present : Yes   Wound Concerns/Notes:  none     PAIN    Pain Assessment    Pain Intensity 1: 0 (09/18/17 1550)    Pain Location 1: Knee         Patient Stated Pain Goal: 10  o Interventions for Pain:  .  o Intervention effective: no  o Time of last intervention: .   o Reassessment Completed: yes      Last 3 Weights:  Last 3 Recorded Weights in this Encounter    09/06/17 1018 09/18/17 0609   Weight: 98 kg (216 lb) 100 kg (220 lb 6 oz)     Weight change:      INTAKE/OUPUT    Current Shift: 09/18 0701 - 09/18 1900  In: 3999 [P.O.:480; I.V.:3500]  Out: 700 [Urine:600]    Last three shifts:       LAB RESULTS   No results for input(s): WBC, HGB, HCT, PLT, HGBEXT, HCTEXT, PLTEXT in the last 72 hours. No results for input(s): NA, K, GLU, BUN, CREA, CA, MG, INR in the last 72 hours. No lab exists for component: PT, PTT, INREXT    RECOMMENDATIONS AND DISCHARGE PLANNING     1. Pending tests/procedures/ Plan of Care or Other Needs: none     2. Discharge plan for patient and Needs/Barriers: home    3. Estimated Discharge Date: 9-21-17 Posted on Whiteboard in [facility]: yes      4. The patient's care plan was reviewed with the oncoming nurse. \"HEALS\" SAFETY CHECK      Fall Risk    Total Score: 2    Safety Measures: Safety Measures: Bed/Chair-Wheels locked, Bed in low position, Call light within reach    A safety check occurred in the patient's room between off going nurse and oncoming nurse listed above.     The safety check included the below items  Area Items   H  High Alert Medications - Verify all high alert medication drips (heparin, PCA, etc.)   E  Equipment - Suction is set up for ALL patients (with yanker)  - Red plugs utilized for all equipment (IV pumps, etc.)  - WOWs wiped down at end of shift.  - Room stocked with oxygen, suction, and other unit-specific supplies   A  Alarms - Bed alarm is set for fall risk patients  - Ensure chair alarm is in place and activated if patient is up in a chair   L  Lines - Check IV for any infiltration  - Caal bag is empty if patient has a Caal   - Tubing and IV bags are labeled   S  Safety   - Room is clean, patient is clean, and equipment is clean. - Hallways are clear from equipment besides carts. - Fall bracelet on for fall risk patients  - Ensure room is clear and free of clutter  - Suction is set up for ALL patients (with yanker)  - Hallways are clear from equipment besides carts.    - Isolation precautions followed, supplies available outside room, sign posted     Melvin Massing

## 2017-09-18 NOTE — PERIOP NOTES
TRANSFER - OUT REPORT:    Verbal report given to Criselda MARIE(name) on [employer]  being transferred to [address] [place] [address](unit) for routine post - op       Report consisted of patients Situation, Background, Assessment and   Recommendations(SBAR). Information from the following report(s) SBAR, Kardex, OR Summary, Procedure Summary, Intake/Output, MAR, Recent Results and Med Rec Status was reviewed with the receiving nurse. Lines:   Peripheral IV 09/18/17 Left Hand (Active)   Site Assessment Clean, dry, & intact 9/18/2017  9:52 AM   Phlebitis Assessment 0 9/18/2017  9:52 AM   Infiltration Assessment 0 9/18/2017  9:52 AM   Dressing Status Clean, dry, & intact 9/18/2017  9:52 AM   Dressing Type Tape;Transparent 9/18/2017  9:52 AM   Hub Color/Line Status Pink; Infusing 9/18/2017  9:52 AM   Alcohol Cap Used No 9/18/2017  6:19 AM        Opportunity for questions and clarification was provided.       Patient transported with:   O2 @ 2 liters  Tech

## 2017-09-18 NOTE — INTERVAL H&P NOTE
H&P Update:  Kell Blair was seen and examined. History and physical has been reviewed. The patient has been examined.  There have been no significant clinical changes since the completion of the originally dated History and Physical.    Signed By: Wilbert Conway MD     September 18, 2017 7:04 AM

## 2017-09-18 NOTE — ANESTHESIA POSTPROCEDURE EVALUATION
Post-Anesthesia Evaluation and Assessment    Patient: James Amezquita MRN: [id number]  SSN: [id number]    YOB: 1950  Age: 61 y.o. Sex: female     VS from flow sheet    Cardiovascular Function/Vital Signs  Visit Vitals    /72    Pulse (!) 53    Temp 36.6 °C (97.8 °F)    Resp 12    Ht 5' 7\" (1.702 m)    Wt 100 kg (220 lb 6 oz)    SpO2 96%    BMI 34.52 kg/m2       Patient is status post general, regional anesthesia for Procedure(s):  RIGHT TOTAL KNEE ARTHROPLASTY. Nausea/Vomiting: None    Postoperative hydration reviewed and adequate. Pain:  Pain Scale 1: Visual (09/18/17 1026)  Pain Intensity 1: 0 (09/18/17 1026)   Managed    Neurological Status:   Neuro (WDL): Exceptions to WDL (09/18/17 7387)  Neuro  Neurologic State: Drowsy (09/18/17 3066)   At baseline    Mental Status and Level of Consciousness: Arousable    Pulmonary Status:   O2 Device: Nasal cannula (09/18/17 1017)   Adequate oxygenation and airway patent    Complications related to anesthesia: None    Post-anesthesia assessment completed.  No concerns    Signed By: Bravo Temple MD     September 18, 2017

## 2017-09-18 NOTE — PROGRESS NOTES
Pt arrived from PACU resting quietly but able to arouse. Vitals are stable within normal limits. Pt has present pulses. Pt has good sensation. Pt has no c/o pain at the time. Stable. Will continue to monitor. Family and call bell a bedside.

## 2017-09-18 NOTE — PROGRESS NOTES
conducted a pre-surgery visit with Ange Richmond, who is a 61 y.o.,female. The  provided the following Interventions:  Initiated a relationship of care and support. Offered prayer and assurance of continued prayers on patient's behalf. Plan:  Chaplains will continue to follow and will provide pastoral care on an as needed/requested basis.  recommends bedside caregivers page  on duty if patient shows signs of acute spiritual or emotional distress.     [address] [facility] Certified [address] [facility]   [phone]

## 2017-09-18 NOTE — IP AVS SNAPSHOT
Hanh Butch 
 
 
 [address] [facility] Patient: Keaton Alfonso MRN: [id number] ITQ:7/19/1008 You are allergic to the following Allergen Reactions Pcn (Penicillins) Unknown (comments) Recent Documentation Height Weight BMI OB Status Smoking Status 1.702 m 100 kg 34.52 kg/m2 Hysterectomy Never Smoker Unresulted Labs Order Current Status CULTURE, URINE In process Emergency Contacts Name Discharge Info Relation Home Work Mobile Mainor Leal DISCHARGE CAREGIVER [3] Spouse [3] [phone] About your hospitalization You were admitted on:  September 18, 2017 You last received care in the:  [facility] [address] [facility] You were discharged on:  September 20, 2017 Unit phone number:  [phone] Why you were hospitalized Your primary diagnosis was:  Not on File Your diagnoses also included: Arthritis Of Knee Providers Seen During Your Hospitalizations Provider Role Specialty Primary office phone Chuy Lo MD Attending Provider Orthopedic Surgery [phone] Your Primary Care Physician (PCP) Primary Care Physician Office Phone Office Fax Yady Mandujano, [address] [place] [address] [phone] Follow-up Information Follow up With Details Comments Contact Info Esme Rios MD On 9/28/2017 September 28, 2017 @ 10:30 with Dr. Maru Henriquez. [facility] 
[phone] David Menard PA-C On 10/5/2017 October 5, 2017 @ 10:15 with [address] [place] [id number] [phone] Your Appointments Thursday October 05, 2017 10:15 AM EDT  
POST OP with David Menard PA-C  
[facility] - [address]) [address] [place] [address] [address] [facility] [address] [phone] Current Discharge Medication List  
  
 START taking these medications Dose & Instructions Dispensing Information Comments Morning Noon Evening Bedtime  
 aspirin 325 mg tablet Commonly known as:  ASPIRIN Your last dose was: Your next dose is:    
   
   
 Dose:  325 mg Take 1 Tab by mouth daily. Quantity:  30 Tab Refills:  0  
     
   
   
   
  
 celecoxib 200 mg capsule Commonly known as:  CELEBREX Your last dose was: Your next dose is:    
   
   
 Dose:  200 mg Take 1 Cap by mouth two (2) times a day for 90 days. Quantity:  60 Cap Refills:  2  
     
   
   
   
  
 ferrous sulfate 325 mg (65 mg iron) tablet Your last dose was: Your next dose is:    
   
   
 Dose:  325 mg Take 1 Tab by mouth two (2) times daily (with meals). Quantity:  60 Tab Refills:  2  
     
   
   
   
  
 oxyCODONE-acetaminophen 7.5-325 mg per tablet Commonly known as:  PERCOCET 7.5 Your last dose was: Your next dose is:    
   
   
 Dose:  1-2 Tab Take 1-2 Tabs by mouth every four (4) hours as needed. Max Daily Amount: 12 Tabs. Quantity:  60 Tab Refills:  0 CONTINUE these medications which have NOT CHANGED Dose & Instructions Dispensing Information Comments Morning Noon Evening Bedtime  
 cetirizine 10 mg tablet Commonly known as:  ZYRTEC Your last dose was: Your next dose is:    
   
   
 Dose:  10 mg Take 10 mg by mouth daily. Refills:  0  
     
   
   
   
  
 COZAAR 100 mg tablet Generic drug:  losartan Your last dose was: Your next dose is:    
   
   
 Dose:  100 mg Take 100 mg by mouth daily. Refills:  0 GLUCOPHAGE 500 mg tablet Generic drug:  metFORMIN Your last dose was: Your next dose is:    
   
   
 Dose:  500 mg Take 500 mg by mouth daily (with breakfast). Refills:  0 LEVOXYL 88 mcg tablet Generic drug:  levothyroxine Your last dose was: Your next dose is:    
   
   
 Dose:  88 mcg Take 88 mcg by mouth daily. Refills:  0 LEXAPRO 10 mg tablet Generic drug:  escitalopram oxalate Your last dose was: Your next dose is:    
   
   
 Dose:  10 mg Take 10 mg by mouth daily. Refills:  0 LORazepam 1 mg tablet Commonly known as:  ATIVAN Your last dose was: Your next dose is:    
   
   
 Dose:  0.5 mg Take 0.5 mg by mouth two (2) times a day. Refills:  0  
     
   
   
   
  
 metoprolol succinate 100 mg tablet Commonly known as:  TOPROL-XL Your last dose was: Your next dose is: TAKE ONE TABLET BY MOUTH DAILY Quantity:  30 Tab Refills:  6  
     
   
   
   
  
 pantoprazole 40 mg tablet Commonly known as:  PROTONIX Your last dose was: Your next dose is:    
   
   
 Dose:  40 mg Take 40 mg by mouth two (2) times a day. Refills:  0  
     
   
   
   
  
 VITAMIN D2 PO Your last dose was: Your next dose is:    
   
   
 Dose:  28697 Units Take 50,000 Units by mouth every Monday. Refills:  0 STOP taking these medications   
 levoFLOXacin 500 mg tablet Commonly known as:  Mereta Cooler your doctor about these medications Dose & Instructions Dispensing Information Comments Morning Noon Evening Bedtime  
 tamsulosin 0.4 mg capsule Commonly known as:  FLOMAX Your last dose was: Your next dose is:    
   
   
 Dose:  0.4 mg Take 1 Cap by mouth daily. Quantity:  30 Cap Refills:  3 Where to Get Your Medications These medications were sent to [address] [place] [address], [facility] [address] Hours:  24-hours Phone:  [phone]  
  tamsulosin 0.4 mg capsule Information on where to get these meds will be given to you by the nurse or doctor. ! Ask your nurse or doctor about these medications  
  aspirin 325 mg tablet  
 celecoxib 200 mg capsule  
 ferrous sulfate 325 mg (65 mg iron) tablet  
 oxyCODONE-acetaminophen 7.5-325 mg per tablet Discharge Instructions Osteoarthritis: Care Instructions Your Care Instructions Arthritis is a common health problem in which the joints are inflamed. There are several kinds of arthritis. Osteoarthritis is caused by a breakdown of cartilage, the hard, thick tissue that cushions the joints. It causes pain, stiffness, and swelling, often in the spine, fingers, hips, and knees. Osteoarthritis can happen at any age, but it is most common in older people. Osteoarthritis never goes away completely, but it can be controlled. Medicine and home treatment can reduce the pain and prevent the arthritis from getting worse. Follow-up care is a key part of your treatment and safety. Be sure to make and go to all appointments, and call your doctor if you are having problems. It's also a good idea to know your test results and keep a list of the medicines you take. How can you care for yourself at home? · Take a warm shower or bath in the morning to relieve stiffness. Avoid sitting still afterwards. · If the joint is not swollen, use moist heat, like a warm, damp towel, for 20 to 30 minutes, 2 or 3 times a day. Do not use heat on a swollen joint. · If the joint is swollen, use ice or cold packs for 10 to 20 minutes, once an hour. Cold will help relieve pain and reduce inflammation. Put a thin cloth between the ice and your skin. · To prevent stiffness, gently move the joint through its full range of motion several times a day. · If the joint hurts, avoid activities that put a strain on it for a few days. Take rest breaks throughout the day. · Get regular exercise.  Walking, swimming, yoga, biking, princess chi, and water aerobics are good exercises that are gentle on the joints. · Reach and stay at a healthy weight. If you need to lose or maintain weight, regular exercise and a healthy diet will help. Extra weight can strain the joints, especially the knees and hips, and make the pain worse. Losing even a few pounds may help. · Take pain medicines exactly as directed. ¨ If the doctor gave you a prescription medicine for pain, take it as prescribed. ¨ If you are not taking a prescription pain medicine, ask your doctor if you can take an over-the-counter medicine. When should you call for help? Call your doctor now or seek immediate medical care if: · The pain is so bad that you cannot use the joint. · You have sudden back pain with weakness in your legs or loss of bowel or bladder control. · Your stools are black and tarlike or have streaks of blood. · You have severe pain and swelling in more than one joint. Watch closely for changes in your health, and be sure to contact your doctor if: 
· You have side effects from the medicines, like belly pain, ongoing heartburn, or nausea. · Joint pain continues for more than 6 weeks, and home treatment is not helping. Where can you learn more? Go to http://gustabo-ruben.info/. Enter I279 in the search box to learn more about \"Osteoarthritis: Care Instructions. \" Current as of: November 28, 2016 Content Version: 11.3 © 8545-4874 [employer]. Care instructions adapted under license by [employer] (which disclaims liability or warranty for this information). If you have questions about a medical condition or this instruction, always ask your healthcare professional. [place] [address] any warranty or liability for your use of this information. Total Knee Replacement: What to Expect at Home Your Recovery When you leave the hospital, you should be able to move around with a walker or crutches. But you will need someone to help you at home for the next few weeks or until you have more energy and can move around better. If there is no one to help you at home, you may go to a rehabilitation center. You will go home with a bandage and stitches or staples. Change the bandage as your doctor tells you to. Your doctor will remove your stitches or staples 10 to 21 days after your surgery. You may still have some mild pain, and the area may be swollen for 3 to 6 months after surgery. Your knee will continue to improve for 6 to 12 months. You will probably use a walker for 1 to 3 weeks and then use crutches. When you are ready, you can use a cane. You will probably be able to walk on your own in 4 to 8 weeks. You will need to do months of physical rehabilitation (rehab) after a knee replacement. Rehab will help you strengthen the muscles of the knee and help you regain movement. After you recover, your artificial knee will allow you to do normal daily activities with less pain or no pain at all. You may be able to hike, dance, ride a bike, and play golf. Talk to your doctor about whether you can do more strenuous activities. Always tell your caregivers that you have an artificial knee. How long it will take to walk on your own, return to normal activities, and go back to work depends on your health and how well your rehabilitation (rehab) program goes. The better you do with your rehab exercises, the quicker you will get your strength and movement back. This care sheet gives you a general idea about how long it will take for you to recover. But each person recovers at a different pace. Follow the steps below to get better as quickly as possible. How can you care for yourself at home? Activity · Rest when you feel tired. You may take a nap, but do not stay in bed all day. When you sit, use a chair with arms. You can use the arms to help you stand up. · Work with your physical therapist to find the best way to exercise. You may be able to take frequent, short walks using crutches or a walker. What you can do as your knee heals will depend on whether your new knee is cemented or uncemented. You may not be able to do certain things for a while if your new knee is uncemented. · After your knee has healed enough, you can do more strenuous activities with caution. ¨ You can golf, but use a golf cart, and do not wear shoes with spikes. ¨ You can bike on a flat road or on a stationary bike. Avoid biking up hills. ¨ Your doctor may suggest that you stay away from activities that put stress on your knee. These include tennis or badminton, squash or racquetball, contact sports like football, jumping (such as in basketball), jogging, or running. ¨ Avoid activities where you might fall. These include horseback riding, skiing, and mountain biking. · Do not sit for more than 1 hour at a time. Get up and walk around for a while before you sit again. If you must sit for a long time, prop up your leg with a chair or footstool. This will help you avoid swelling. · Ask your doctor when you can shower. You may need to take sponge baths until your stitches or staples have been removed. · Ask your doctor when you can drive again. It may take up to 8 weeks after knee replacement surgery before it is safe for you to drive. · When you get into a car, sit on the edge of the seat. Then pull in your legs, and turn to face the front. · You should be able to do many everyday activities 3 to 6 weeks after your surgery. You will probably need to take 4 to 16 weeks off from work. When you can go back to work depends on the type of work you do and how you feel. · Ask your doctor when it is okay for you to have sex. · Do not lift anything heavier than 10 pounds and do not lift weights for 12 weeks. Diet · By the time you leave the hospital, you should be eating your normal diet. If your stomach is upset, try bland, low-fat foods like plain rice, broiled chicken, toast, and yogurt. Your doctor may suggest that you take iron and vitamin supplements. · Drink plenty of fluids (unless your doctor tells you not to). · Eat healthy foods, and watch your portion sizes. Try to stay at your ideal weight. Too much weight puts more stress on your new knee. · You may notice that your bowel movements are not regular right after your surgery. This is common. Try to avoid constipation and straining with bowel movements. You may want to take a fiber supplement every day. If you have not had a bowel movement after a couple of days, ask your doctor about taking a mild laxative. Medicines · Your doctor will tell you if and when you can restart your medicines. He or she will also give you instructions about taking any new medicines. · If you take blood thinners, such as warfarin (Coumadin), clopidogrel (Plavix), or aspirin, be sure to talk to your doctor. He or she will tell you if and when to start taking those medicines again. Make sure that you understand exactly what your doctor wants you to do. · Your doctor may give you a blood-thinning medicine to prevent blood clots. If you take a blood thinner, be sure you get instructions about how to take your medicine safely. Blood thinners can cause serious bleeding problems. This medicine could be in pill form or as a shot (injection). If a shot is necessary, your doctor will tell you how to do this. · Be safe with medicines. Take pain medicines exactly as directed. ¨ If the doctor gave you a prescription medicine for pain, take it as prescribed. ¨ If you are not taking a prescription pain medicine, ask your doctor if you can take an over-the-counter medicine. ¨ Plan to take your pain medicine 30 minutes before exercises. It is easier to prevent pain before it starts than to stop it once it has started. · If you think your pain medicine is making you sick to your stomach: 
¨ Take your medicine after meals (unless your doctor has told you not to). ¨ Ask your doctor for a different pain medicine. · If your doctor prescribed antibiotics, take them as directed. Do not stop taking them just because you feel better. You need to take the full course of antibiotics. Incision care · You will have a bandage over the cut (incision) and staples or stitches. Take the bandage off when your doctor says it is okay. · Your doctor will remove the staples or stitches 10 days to 3 weeks after the surgery and replace them with strips of tape. Leave the tape on for a week or until it falls off. Exercise · Your rehab program will give you a number of exercises to do to help you get back your knee's range of motion and strength. Always do them as your therapist tells you. Ice and elevation · For pain and swelling, put ice or a cold pack on the area for 10 to 20 minutes at a time. Put a thin cloth between the ice and your skin. Other instructions · Continue to wear your support stockings as your doctor says. These help to prevent blood clots. The length of time that you will have to wear them depends on your activity level and the amount of swelling. · Wear medical alert jewelry that says you may need antibiotics before any procedure, including dental work. You can buy this at most drugstores. · You have metal pieces in your knee. These may set off some airport metal detectors. Carry a medical alert card that says you have an artificial joint, just in case. Follow-up care is a key part of your treatment and safety. Be sure to make and go to all appointments, and call your doctor if you are having problems. It's also a good idea to know your test results and keep a list of the medicines you take. When should you call for help? Call 911 anytime you think you may need emergency care. For example, call if: · You passed out (lost consciousness). · You have severe trouble breathing. · You have sudden chest pain and shortness of breath, or you cough up blood. Call your doctor now or seek immediate medical care if: 
· You have signs of infection, such as: 
¨ Increased pain, swelling, warmth, or redness. ¨ Red streaks leading from the incision. ¨ Pus draining from the incision. ¨ A fever. · You have signs of a blood clot, such as: 
¨ Pain in your calf, back of the knee, thigh, or groin. ¨ Redness and swelling in your leg or groin. · Your incision comes open and begins to bleed, or the bleeding increases. · You have pain that does not get better after you take pain medicine. Watch closely for changes in your health, and be sure to contact your doctor if: 
· You do not have a bowel movement after taking a laxative. Where can you learn more? Go to http://gustabo-ruben.info/. Enter L344 in the search box to learn more about \"Total Knee Replacement: What to Expect at Home. \" Current as of: March 21, 2017 Content Version: 11.3 © 6190-9316 [employer]ister [employer]uch[employer]. Care instructions adapted under license by [employer] (which disclaims liability or warranty for this information). If you have questions about a medical condition or this instruction, always ask your healthcare professional. [address] any warranty or liability for your use of this information. Patient armband removed and shredded DISCHARGE SUMMARY from Nurse The following personal items are in your possession at time of discharge: 
 
Dental Appliances: None Visual Aid: None Home Medications: None Jewelry: None Clothing: Undergarments, Pants, Shirt, Socks, Footwear Other Valuables: Marlin Hamman PATIENT INSTRUCTIONS: 
 
 
F-face looks uneven A-arms unable to move or move unevenly S-speech slurred or non-existent T-time-call 911 as soon as signs and symptoms begin-DO NOT go Back to bed or wait to see if you get better-TIME IS BRAIN. Warning Signs of HEART ATTACK Call 911 if you have these symptoms: 
? Chest discomfort. Most heart attacks involve discomfort in the center of the chest that lasts more than a few minutes, or that goes away and comes back. It can feel like uncomfortable pressure, squeezing, fullness, or pain. ? Discomfort in other areas of the upper body. Symptoms can include pain or discomfort in one or both arms, the back, neck, jaw, or stomach. ? Shortness of breath with or without chest discomfort. ? Other signs may include breaking out in a cold sweat, nausea, or lightheadedness. Don't wait more than five minutes to call [address]! Fast action can save your life. Calling 911 is almost always the fastest way to get lifesaving treatment. Emergency Medical Services staff can begin treatment when they arrive  up to an hour sooner than if someone gets to the hospital by car. The discharge information has been reviewed with the patient. The patient verbalized understanding. Discharge medications reviewed with the patient and appropriate educational materials and side effects teaching were provided. ResourceKraft Activation Thank you for requesting access to ResourceK[employer]. Please follow the instructions below to securely access and download your online medical record. ResourceKraft allows you to send messages to your doctor, view your test results, renew your prescriptions, schedule appointments, and more. How Do I Sign Up? 1. In your internet browser, go to [email or web address] Global Weather 
2. Click on the First Time User? Click Here link in the Sign In box. You will be redirect to the New Member Sign Up page. 3. Enter your [facility] Access Code exactly as it appears below. You will not need to use this code after youve completed the sign-up process. If you do not sign up before the expiration date, you must request a new code. [email or web address] Access Code: Activation code not generated Current [facility] Status: Patient Declined (This is the date your Gaopengt access code will ) 4. Enter the last four digits of your Social Security Number (xxxx) and Date of Birth (mm/dd/yyyy) as indicated and click Submit. You will be taken to the next sign-up page. 5. Create a [facility] ID. This will be your [facility] login ID and cannot be changed, so think of one that is secure and easy to remember. 6. Create a [facility] password. You can change your password at any time. 7. Enter your Password Reset Question and Answer. This can be used at a later time if you forget your password. 8. Enter your e-mail address. You will receive e-mail notification when new information is available in [address] 9. Click Sign Up. You can now view and download portions of your medical record. 10. Click the Download Summary menu link to download a portable copy of your medical information. Additional Information If you have questions, please visit the Frequently Asked Questions section of the [facility] website at [email or web address] My [email or web address] [email or web address]. [email or web address] Remember, [facility] is NOT to be used for urgent needs. For medical emergencies, dial 911. Discharge Orders Procedure Order Date Status Priority Quantity Spec Type Associated Dx WALKER STANDARD 17 Normal Routine 1  Arthritis of knee [[id number]] ELEVATED TOILET SEAT 17 Normal Routine 1  Arthritis of knee [[id number]] COMMODE CHAIR 09/19/17 0947 Normal Routine 1  Arthritis of knee [[id number]] SHOWER CHAIR 09/19/17 0947 Normal Routine 1  Arthritis of knee [[id number]] [address] [place] [address] 09/19/17 0947 Normal Routine 1  Arthritis of knee [[id number]] Comments: Total knee protocol, wbat Aspirin therapy 
aquacel ag dressing pod 7 and prn MyChart Announcement We are excited to announce that we are making your provider's discharge notes available to you in Tooth Bank[employer]. You will see these notes when they are completed and signed by the physician that discharged you from your recent hospital stay. If you have any questions or concerns about any information you see in Tooth Bankhart, please call the Health Information Department where you were seen or reach out to your Primary Care Provider for more information about your plan of care. General Information Please provide this summary of care documentation to your next provider. Patient Signature:  ____________________________________________________________ Date:  ____________________________________________________________  
  
Savanah Mendozaamento Provider Signature:  ____________________________________________________________ Date:  ____________________________________________________________

## 2017-09-18 NOTE — IP AVS SNAPSHOT
[address] Patient: Don Latif MRN: [id number] AEP:8/03/4540 Current Discharge Medication List  
  
START taking these medications Dose & Instructions Dispensing Information Comments Morning Noon Evening Bedtime  
 aspirin 325 mg tablet Commonly known as:  ASPIRIN Your last dose was: Your next dose is:    
   
   
 Dose:  325 mg Take 1 Tab by mouth daily. Quantity:  30 Tab Refills:  0  
     
   
   
   
  
 celecoxib 200 mg capsule Commonly known as:  CELEBREX Your last dose was: Your next dose is:    
   
   
 Dose:  200 mg Take 1 Cap by mouth two (2) times a day for 90 days. Quantity:  60 Cap Refills:  2  
     
   
   
   
  
 ferrous sulfate 325 mg (65 mg iron) tablet Your last dose was: Your next dose is:    
   
   
 Dose:  325 mg Take 1 Tab by mouth two (2) times daily (with meals). Quantity:  60 Tab Refills:  2  
     
   
   
   
  
 oxyCODONE-acetaminophen 7.5-325 mg per tablet Commonly known as:  PERCOCET 7.5 Your last dose was: Your next dose is:    
   
   
 Dose:  1-2 Tab Take 1-2 Tabs by mouth every four (4) hours as needed. Max Daily Amount: 12 Tabs. Quantity:  60 Tab Refills:  0 CONTINUE these medications which have NOT CHANGED Dose & Instructions Dispensing Information Comments Morning Noon Evening Bedtime  
 cetirizine 10 mg tablet Commonly known as:  ZYRTEC Your last dose was: Your next dose is:    
   
   
 Dose:  10 mg Take 10 mg by mouth daily. Refills:  0  
     
   
   
   
  
 COZAAR 100 mg tablet Generic drug:  losartan Your last dose was: Your next dose is:    
   
   
 Dose:  100 mg Take 100 mg by mouth daily. Refills:  0 GLUCOPHAGE 500 mg tablet Generic drug:  metFORMIN  
   
 Your last dose was: Your next dose is:    
   
   
 Dose:  500 mg Take 500 mg by mouth daily (with breakfast). Refills:  0 LEVOXYL 88 mcg tablet Generic drug:  levothyroxine Your last dose was: Your next dose is:    
   
   
 Dose:  88 mcg Take 88 mcg by mouth daily. Refills:  0 LEXAPRO 10 mg tablet Generic drug:  escitalopram oxalate Your last dose was: Your next dose is:    
   
   
 Dose:  10 mg Take 10 mg by mouth daily. Refills:  0 LORazepam 1 mg tablet Commonly known as:  ATIVAN Your last dose was: Your next dose is:    
   
   
 Dose:  0.5 mg Take 0.5 mg by mouth two (2) times a day. Refills:  0  
     
   
   
   
  
 metoprolol succinate 100 mg tablet Commonly known as:  TOPROL-XL Your last dose was: Your next dose is: TAKE ONE TABLET BY MOUTH DAILY Quantity:  30 Tab Refills:  6  
     
   
   
   
  
 pantoprazole 40 mg tablet Commonly known as:  PROTONIX Your last dose was: Your next dose is:    
   
   
 Dose:  40 mg Take 40 mg by mouth two (2) times a day. Refills:  0  
     
   
   
   
  
 VITAMIN D2 PO Your last dose was: Your next dose is:    
   
   
 Dose:  69846 Units Take 50,000 Units by mouth every Monday. Refills:  0 STOP taking these medications   
 levoFLOXacin 500 mg tablet Commonly known as:  Shiv Baumannume your doctor about these medications Dose & Instructions Dispensing Information Comments Morning Noon Evening Bedtime  
 tamsulosin 0.4 mg capsule Commonly known as:  FLOMAX Your last dose was: Your next dose is:    
   
   
 Dose:  0.4 mg Take 1 Cap by mouth daily. Quantity:  30 Cap Refills:  3 Where to Get Your Medications These medications were sent to [address] [place] [address], [facility] [facility] [address] [facility]  [facility] [address] [place] [id number] Hours:  24-hours Phone:  [phone]  
  tamsulosin 0.4 mg capsule Information on where to get these meds will be given to you by the nurse or doctor. ! Ask your nurse or doctor about these medications  
  aspirin 325 mg tablet  
 celecoxib 200 mg capsule  
 ferrous sulfate 325 mg (65 mg iron) tablet  
 oxyCODONE-acetaminophen 7.5-325 mg per tablet

## 2017-09-18 NOTE — ANESTHESIA PROCEDURE NOTES
Peripheral Block    Start time: 9/18/2017 7:05 AM  End time: 9/18/2017 7:15 AM  Performed by: Sammi Campa  Authorized by: Sammi Campa       Pre-procedure: Indications: at surgeon's request and post-op pain management    Preanesthetic Checklist: patient identified, risks and benefits discussed, site marked, timeout performed, anesthesia consent given and patient being monitored    Timeout Time: 07:05          Block Type:   Block Type:  Femoral single shot  Laterality:  Right and femoral    Assessment:    Injection Assessment:     Single Shot Nerve Block Procedure Note    Patient: Kayla Pena MRN: [id number]  SSN: [id number]   YOB: 1950  Age: 61 y.o. Sex: female      Cardiovascular Function/Vital Signs  There were no vitals taken for this visit. Blocks Femoral  Referring physician:   : José Antonio Oscar MD    Indication: Post-operative analgesia per surgeon's request  Location: Preoperative Holding area. Sedation: Midazolam 2 mg, fentanyl 100mcg  Time out performed, correct patient, side, site, and procedure verified. Patient placed in supine position. Monitors/oxygen applied; right groin marked and prepped with chloraprep. Target nerves identified by ultrasound and nerve stimulator, 30 ml's 0.2% Ropivicaine injected in divided doses with negative aspiration through 21 gauge 10 cm Stimuplex insulated needle. Nerve stimulator set up 1.5 mA, 0.1 mS, 2 HZ. patellar twitch of fem nerve. Twitch lost at 0.3 mA. Depth of needle at time of injection 5 cm. Block Notes:   Incremental injection    Blood aspirated:  NO    Persistent Pain with injection:  NO    Resistance to injection:  NO    Events:  None - Easy and well-tolerated:  YES       Difficult:  NO   Ultrasound guidance used for needle placement  Nerves and surrounding structures ID'd  Perineural injection   No IV injection  Patient tolerated procedure well, vital signs stable throughout, with no apparent complications.   9/18/2017 7:56 AM  Tracey Tucker MD

## 2017-09-18 NOTE — PROGRESS NOTES
Problem: Mobility Impaired (Adult and Pediatric)  Goal: *Acute Goals and Plan of Care (Insert Text)  Physical Therapy Goals  Initiated 9/18/2017 and to be accomplished within 7 day(s)  1. Patient will move from supine to sit and sit to supine , scoot up and down and roll side to side in bed with modified independence. 2. Patient will transfer from bed to chair and chair to bed with modified independence using the least restrictive device. 3. Patient will perform sit to stand with modified independence. 4. Patient will ambulate with modified independence for >200 feet with the least restrictive device. 5. Patient will ascend/descend 8 stairs with 1 handrail(s) with supervision/set-up. 6. Patient will perform HEP independently and increased ROM and strength for carryover into functional tasks. PHYSICAL THERAPY EVALUATION     Patient: Alesha Herrera (89 y.o. female)  Date: 9/18/2017  Primary Diagnosis: Osteoarthritis of right knee, unspecified osteoarthritis type [M17.11]  Procedure(s) (LRB):  RIGHT TOTAL KNEE ARTHROPLASTY (Right) Day of Surgery   Precautions: Fall, WBAT RLE      ASSESSMENT :  Pt is 60yo F admitted to hospital for R TKA and presents today alert and agreeable to therapy. Pt transferred to EOB and performed objective assessment. Pt able to perform SLR with minimal to moderate quad lag. Pt assisted to standing with 2 person assist for safety. Pt educated on WBAT status, however instructed to place weight through LLE as nerve block still in effect; pt acknowledged understanding. Pt able to take side steps towards [facility] and assisted back to supine. Pt educated on towel roll under ankle, TE (see below), and role of PT. Pt left resting with call bell by her side and instructions to call for assistance if she needed to get up for any reason. CYNTHIA Godfrey notified pt appropriate for stand pivot to [facility].  Pt demonstrated decreased endurance, strength, and mobility and will benefit from skilled intervention to address the above impairments. Patients rehabilitation potential is considered to be Fair  Factors which may influence rehabilitation potential include:   [ ]         None noted  [ ]         Mental ability/status  [X]         Medical condition  [X]         Home/family situation and support systems  [X]         Safety awareness  [X]         Pain tolerance/management  [ ]         Other:        PLAN :  Recommendations and Planned Interventions:  [X]           Bed Mobility Training             [X]    Neuromuscular Re-Education  [X]           Transfer Training                   [ ]    Orthotic/Prosthetic Training  [X]           Gait Training                          [ ]    Modalities  [X]           Therapeutic Exercises          [ ]    Edema Management/Control  [X]           Therapeutic Activities            [X]    Patient and Family Training/Education  [ ]           Other (comment):     Frequency/Duration: Patient will be followed by physical therapy 1-2 times per day/4-7 days per week to address goals. Discharge Recommendations: Home Health  Further Equipment Recommendations for Discharge: rolling walker       G-CODES      Mobility  Current  CJ= 20-39%   Goal  CI= 1-19%. The severity rating is based on the Level of Assistance required for Functional Mobility and ADLs.            G-CODES      Eval Complexity: History: MEDIUM  Complexity : 1-2 comorbidities / personal factors will impact the outcome/ POC Exam:MEDIUM Complexity : 3 Standardized tests and measures addressing body structure, function, activity limitation and / or participation in recreation  Presentation: LOW Complexity : Stable, uncomplicated  Clinical Decision Making:Low Complexity   Overall Complexity:LOW       SUBJECTIVE:   Patient stated I feel sleepy.       OBJECTIVE DATA SUMMARY:       Past Medical History:   Diagnosis Date    Breast cyst 1994     benign, removed    Deviated septum 04/2010     septoplasty    Diabetes mellitus Type 2    GERD (gastroesophageal reflux disease)       severe    Hypertension      Hypothyroidism      Other elective surgery July 2008     toe surgery    Pure hypercholesterolemia      Stress thallium 09/30/2009     No evidence of ischemia or infarction; EF 78%; EKG portion negative with exercise capacity below average for age at 6 min of exercise    Varicose veins       closure in right leg 2008 & left leg 2007    Venous reflux study 06/24/2014     [facility]. Past Surgical History:   Procedure Laterality Date    CYSTOTOMY,EXCIS BLADDER TUMOR   2006    HX HYSTERECTOMY        HX PARTIAL HYSTERECTOMY   1994    SD EGD DELIVER THERMAL ENERGY SPHNCTR/CARDIA GERD        THYROIDECTOMY   1992     Prior Level of Function/Home Situation: Pt lives in tri-level home and has 10STE in home. Pt has 5STE and used SPC for mobility PTA. Pt has RW, SC, grab bars in tub, and elevated commode height. Home Situation  Home Environment: Private residence  # Steps to Enter: 5  One/Two Story Residence: Other (Comment) (3 stories, sleeps on 3rd floor)  # of Interior Steps: 10  Interior Rails: Both  Lift Chair Available: Yes  Living Alone: No  Support Systems: Spouse/Significant Other/Partner  Patient Expects to be Discharged to[de-identified] Private residence  Current DME Used/Available at Home: None  Critical Behavior:  Neurologic State: Drowsy    oriented x4  Strength:    Strength:  Within functional limits (R knee ext 2/5, flex 3/5, DF 4/5; LLE 4+/5)   Tone & Sensation:   Tone: Normal (BLE)   Sensation: Impaired (RLE to LT)   Range Of Motion:  AROM: Within functional limits (R knee 0-130 degrees)   Functional Mobility:  Bed Mobility:   Supine to Sit: Minimum assistance  Sit to Supine: Minimum assistance  Scooting: Stand-by asssistance  Transfers:  Sit to Stand: Minimum assistance;Assist x2  Stand to Sit: Minimum assistance      Balance:   Seated: intact  Sitting:  Impaired; fair     Ambulation/Gait Training:  Distance (ft): 5 Feet (ft) (sidesteps to [facility])  Assistive Device: Walker, rolling  Ambulation - Level of Assistance: Minimal assistance   Gait Abnormalities: Decreased step clearance  Right Side Weight Bearing: As tolerated   Stance: Right decreased; Left increased   Interventions: Verbal cues; Visual/Demos   Therapeutic Exercises:   Instructed in heel slides and quad sets to perform x10 on the hour  Performed x2 for teach back  Pain:  Pt reports 0/10 pain or discomfort prior to treatment. Pt reports 0/10 pain or discomfort post treatment. Activity Tolerance:   Pt tolerated activity well despite mild persistent dizziness throughout session. Please refer to the flowsheet for vital signs taken during this treatment. After treatment:   [ ]         Patient left in no apparent distress sitting up in chair  [X]         Patient left in no apparent distress in bed  [X]         Call bell left within reach  [X]         Nursing notified  [X]         Caregiver present  [ ]         Bed alarm activated      COMMUNICATION/EDUCATION:   [X]         Fall prevention education was provided and the patient/caregiver indicated understanding. [X]         Patient/family have participated as able in goal setting and plan of care. [X]         Patient/family agree to work toward stated goals and plan of care. [ ]         Patient understands intent and goals of therapy, but is neutral about his/her participation. [ ]         Patient is unable to participate in goal setting and plan of care.      Thank you for this referral.  Asia Clemons, PT   Time Calculation: 23 mins

## 2017-09-18 NOTE — BRIEF OP NOTE
BRIEF OPERATIVE NOTE    Date of Procedure: 9/18/2017   Preoperative Diagnosis: Osteoarthritis of right knee, unspecified osteoarthritis type [M17.11]  Postoperative Diagnosis: Osteoarthritis of right knee, unspecified osteoarthritis type [M17.11]    Procedure(s):  RIGHT TOTAL KNEE ARTHROPLASTY  Surgeon(s) and Role:     * Aurora Rodriguez MD - Primary         Assistant Staff:  Physician Assistant: Chay Dillard PA-C    Surgical Staff:  Circ-1: Klarissa Barbosa RN  Physician Assistant: Chay Dillard PA-C  Scrub Tech-1: Del [place]  Surg Asst-1: Braxtonbozena Rolls  Event Time In   Incision Start 2579   Incision Close      Anesthesia: General   Estimated Blood Loss: 55ml  Specimens: * No specimens in log *   Findings: same   Complications: none  Implants:   Implant Name Type Inv.  Item Serial No.  Lot No. LRB No. Used Action   CEMENT BNE SIMPLEX TOBRA 4 --  - [id number]  CEMENT BNE SIMPLEX TOBRA 4 --   [employer] [facility] [id number] 1 Implanted   CEMENT BNE SIMPLEX TOBRA 4 --  - [id number]  CEMENT BNE SIMPLEX TOBRA 4 --   [employer] [facility] [id number] Right 1 Implanted   PAT ASYM TRITHLON X3 [id number] -- TRIATHLON ASYMMETRIC X3 - [id number]  PAT ASYM TRITHLON X3 [id number] -- TRIATHLON ASYMMETRIC X3  [employer] [facility] [id number] Right 1 Implanted   BASEPLT TIB UNIV TRIATHLN 5 --  - [id number]  BASEPLT TIB UNIV TRIATHLN 5 --   [employer] [facility] [id number] Right 1 Implanted   COMPNT FEM POST STBL 5 R --  - [id number]  COMPNT FEM POST STBL 5 R --   [employer] HOW [id number] Right 1 Implanted   INSERT TIB PS TRIATHLN X3 5 11 --  - [id number]   INSERT TIB PS TRIATHLN X3 5 11 --    [employer] HOW [id number] Right 1 Implanted

## 2017-09-18 NOTE — ANESTHESIA PREPROCEDURE EVALUATION
Anesthetic History   No history of anesthetic complications            Review of Systems / Medical History  Patient summary reviewed and pertinent labs reviewed    Pulmonary  Within defined limits                 Neuro/Psych   Within defined limits           Cardiovascular    Hypertension: well controlled              Exercise tolerance: >4 METS     GI/Hepatic/Renal     GERD: well controlled           Endo/Other    Diabetes: type 2  Hypothyroidism       Other Findings   Comments: C/O post nasal drip. Occassional dry cough. Denies any other symptoms      Risk Factors for Postoperative nausea/vomiting:       History of postoperative nausea/vomiting? NO       Female? YES       Motion sickness? NO       Intended opioid administration for postoperative analgesia? NO      Smoking Abstinence  Current Smoker? NO  Elective Surgery? YES  Seen preoperatively by anesthesiologist or proxy prior to day of surgery? YES  Pt abstained from smoking 24 hours prior to anesthesia?  N/A           Physical Exam    Airway  Mallampati: II  TM Distance: 4 - 6 cm  Neck ROM: normal range of motion   Mouth opening: Normal     Cardiovascular  Regular rate and rhythm,  S1 and S2 normal,  no murmur, click, rub, or gallop             Dental  No notable dental hx       Pulmonary  Breath sounds clear to auscultation               Abdominal  GI exam deferred       Other Findings            Anesthetic Plan    ASA: 2  Anesthesia type: general and regional - femoral single shot          Induction: Intravenous  Anesthetic plan and risks discussed with: Patient

## 2017-09-18 NOTE — PROGRESS NOTES
Problem: Falls - Risk of  Goal: *Absence of Falls  Document Norberto Fall Risk and appropriate interventions in the flowsheet.   Outcome: Progressing Towards Goal  Fall Risk Interventions:

## 2017-09-18 NOTE — PROGRESS NOTES
Mobility Intervention:       [] Pt dangled at edge of bed    [x] Pt assisted OOB to bedside commode    [] Pt assisted OOB to chair    [] Pt ambulated to bathroom    [] Patient was ambulated in room/hallway    Assistive Device Utilized (check all that apply):       [x] Rolling walker   [] Crutches   [] Straight Cane   [] Knee immobilizer   [] IV pole    After Mobilization:     [] Patient left in no apparent distress sitting up in chair  [x] Patient left in no apparent distress in bed  [x] Call bell left within reach  Assistive Device:        [x] RN notified  [] Caregiver present  [] Bed alarm activated    Comments:   Pt tolerated well

## 2017-09-18 NOTE — H&P (VIEW-ONLY)
[address] [facility], [address] [facility]  [phone]           HISTORY & PHYSICAL      Patient: Shashank Barrera                MRN: [id number]       SSN: [id number]  YOB: 1950        AGE: 61 y.o. SEX: female  Body mass index is 35.71 kg/(m^2). PCP: Nelly Koyanagi, MD  09/13/17      CC: right knee end stage OA  Problem List Items Addressed This Visit     None      Visit Diagnoses     Right knee pain, unspecified chronicity    -  Primary    Relevant Orders    AMB POC XRAY, KNEE; COMPLETE, 4+ VIEW (Completed)    Primary osteoarthritis of right knee                HPI:  The patient is a pleasant 61 y.o. whom has end stage OA of their Right knee and has failed conservative treatment including but not limited to NSAIDS, cortisone injections, viscosupplementation, PT, and pain medicine. Due to the current findings and affected activities of daily living, surgical intervention is indicated. The alternatives, risks, complications, as well as expected outcome were discussed. These include but are not limited to infection, blood loss, need for blood transfusion, neurovascular damage, DVT, PE,  post-op stiffness and pain, leg length discrepancy, dislocation, anesthetic complications, prothesis longevity, need for more surgery, MI, stroke, and even death. The patient understands and wishes to proceed with surgery.       Past Medical History:   Diagnosis Date    Breast cyst 1994    benign, removed    Deviated septum 04/2010    septoplasty    Diabetes mellitus     Type 2    GERD (gastroesophageal reflux disease)     severe    Hypertension     Hypothyroidism     Other elective surgery July 2008    toe surgery    Pure hypercholesterolemia     Stress thallium 09/30/2009    No evidence of ischemia or infarction; EF 78%; EKG portion negative with exercise capacity below average for age at 6 min of exercise    Varicose veins closure in right leg 2008 & left leg 2007    Venous reflux study 06/24/2014    No DVT. [facility] occlusions of bilateral GSV. Current Outpatient Prescriptions:     levoFLOXacin (LEVAQUIN) 500 mg tablet, 1 po q day x 5 days, Disp: 5 Tab, Rfl: 0    cetirizine (ZYRTEC) 10 mg tablet, Take 10 mg by mouth daily. , Disp: , Rfl:     metoprolol succinate (TOPROL-XL) 100 mg tablet, TAKE ONE TABLET BY MOUTH DAILY, Disp: 30 Tab, Rfl: 6    losartan (COZAAR) 100 mg tablet, Take 100 mg by mouth daily. , Disp: , Rfl:     escitalopram oxalate (LEXAPRO) 10 mg tablet, Take 10 mg by mouth daily. , Disp: , Rfl:     pantoprazole (PROTONIX) 40 mg tablet, Take 40 mg by mouth two (2) times a day., Disp: , Rfl:     metformin (GLUCOPHAGE) 500 mg tablet, Take 500 mg by mouth daily (with breakfast). , Disp: , Rfl:     levothyroxine (LEVOXYL) 88 mcg tablet, Take 88 mcg by mouth daily. , Disp: , Rfl:     lorazepam (ATIVAN) 1 mg tablet, Take 0.5 mg by mouth two (2) times a day., Disp: , Rfl:     ERGOCALCIFEROL, VITAMIN D2, (VITAMIN D PO), Take 50,000 Units by mouth every Monday., Disp: , Rfl:     Allergies   Allergen Reactions    Pcn [Penicillins] Unknown (comments)       Social History     Social History    Marital status:      Spouse name: N/A    Number of children: N/A    Years of education: N/A     Occupational History    Not on file.      Social History Main Topics    Smoking status: Never Smoker    Smokeless tobacco: Never Used    Alcohol use No    Drug use: No    Sexual activity: Not on file     Other Topics Concern    Not on file     Social History Narrative       Past Surgical History:   Procedure Laterality Date    CYSTOTOMY,EXCIS BLADDER TUMOR  2006    HX PARTIAL HYSTERECTOMY  1994    NH EGD DELIVER THERMAL ENERGY SPHNCTR/CARDIA GERD      THYROIDECTOMY  1992       PE:  Visit Vitals    /72    Pulse 65    Temp 97.7 °F (36.5 °C) (Oral)    Resp 18    Ht 5' 7\" (1.702 m)    Wt 228 lb (103.4 kg)    SpO2 95%    BMI 35.71 kg/m2     A&O X3, NAD, well develop, well nourished  Heart: S1-S2, rrr  Lungs: CTA bilat  Abd: soft, nt, nt, + bs in all quadrants  Ext:  Pos distal pulses to DP, PT      X-ray: right knee shows end stage OA    Labs: labs were reviewed and wnl.  ua pos, treated with levaquin    A:  Right  knee end stage OA    P:  At this point we will move forward with surgery. Again, the alternatives, risks, complications, as well as expected outcome were discussed and the patient wishes to proceed with surgery. Pt has been instructed to stop aspirin, nsaids, rheumatologic medications and blood thinners. They have also been instructed to continue on any heart and bp meds and to take them the morning of surgery with sips of water.          Jonathan Cuenca

## 2017-09-19 LAB
ANION GAP SERPL CALC-SCNC: 7 MMOL/L (ref 3–18)
ATRIAL RATE: 59 BPM
BASOPHILS # BLD: 0 K/UL (ref 0–0.1)
BASOPHILS NFR BLD: 0 % (ref 0–2)
BUN SERPL-MCNC: 22 MG/DL (ref 7–18)
BUN/CREAT SERPL: 19 (ref 12–20)
CALCIUM SERPL-MCNC: 8.1 MG/DL (ref 8.5–10.1)
CALCULATED R AXIS, ECG10: -26 DEGREES
CALCULATED T AXIS, ECG11: 157 DEGREES
CHLORIDE SERPL-SCNC: 108 MMOL/L (ref 100–108)
CO2 SERPL-SCNC: 25 MMOL/L (ref 21–32)
CREAT SERPL-MCNC: 1.15 MG/DL (ref 0.6–1.3)
DIAGNOSIS, 93000: NORMAL
DIFFERENTIAL METHOD BLD: ABNORMAL
EOSINOPHIL # BLD: 0.1 K/UL (ref 0–0.4)
EOSINOPHIL NFR BLD: 1 % (ref 0–5)
ERYTHROCYTE [DISTWIDTH] IN BLOOD BY AUTOMATED COUNT: 15.1 % (ref 11.6–14.5)
GLUCOSE BLD STRIP.AUTO-MCNC: 105 MG/DL (ref 70–110)
GLUCOSE BLD STRIP.AUTO-MCNC: 111 MG/DL (ref 70–110)
GLUCOSE BLD STRIP.AUTO-MCNC: 126 MG/DL (ref 70–110)
GLUCOSE BLD STRIP.AUTO-MCNC: 90 MG/DL (ref 70–110)
GLUCOSE SERPL-MCNC: 125 MG/DL (ref 74–99)
HCT VFR BLD AUTO: 32.7 % (ref 35–45)
HGB BLD-MCNC: 10.1 G/DL (ref 12–16)
INR PPP: 1.1 (ref 0.8–1.2)
LYMPHOCYTES # BLD: 1.4 K/UL (ref 0.9–3.6)
LYMPHOCYTES NFR BLD: 19 % (ref 21–52)
MAGNESIUM SERPL-MCNC: 2.2 MG/DL (ref 1.6–2.6)
MCH RBC QN AUTO: 28.5 PG (ref 24–34)
MCHC RBC AUTO-ENTMCNC: 30.9 G/DL (ref 31–37)
MCV RBC AUTO: 92.4 FL (ref 74–97)
MONOCYTES # BLD: 0.9 K/UL (ref 0.05–1.2)
MONOCYTES NFR BLD: 13 % (ref 3–10)
NEUTS SEG # BLD: 5.1 K/UL (ref 1.8–8)
NEUTS SEG NFR BLD: 67 % (ref 40–73)
P-R INTERVAL, ECG05: 158 MS
PHOSPHATE SERPL-MCNC: 3.9 MG/DL (ref 2.5–4.9)
PLATELET # BLD AUTO: 213 K/UL (ref 135–420)
PMV BLD AUTO: 10 FL (ref 9.2–11.8)
POTASSIUM SERPL-SCNC: 4.3 MMOL/L (ref 3.5–5.5)
PROTHROMBIN TIME: 14.1 SEC (ref 11.5–15.2)
Q-T INTERVAL, ECG07: 454 MS
QRS DURATION, ECG06: 72 MS
QTC CALCULATION (BEZET), ECG08: 449 MS
RBC # BLD AUTO: 3.54 M/UL (ref 4.2–5.3)
SODIUM SERPL-SCNC: 140 MMOL/L (ref 136–145)
VENTRICULAR RATE, ECG03: 59 BPM
WBC # BLD AUTO: 7.5 K/UL (ref 4.6–13.2)

## 2017-09-19 PROCEDURE — 83735 ASSAY OF MAGNESIUM: CPT | Performed by: ORTHOPAEDIC SURGERY

## 2017-09-19 PROCEDURE — 85025 COMPLETE CBC W/AUTO DIFF WBC: CPT | Performed by: ORTHOPAEDIC SURGERY

## 2017-09-19 PROCEDURE — 74011250637 HC RX REV CODE- 250/637: Performed by: ORTHOPAEDIC SURGERY

## 2017-09-19 PROCEDURE — 80048 BASIC METABOLIC PNL TOTAL CA: CPT | Performed by: ORTHOPAEDIC SURGERY

## 2017-09-19 PROCEDURE — 0SRC0J9 REPLACEMENT OF RIGHT KNEE JOINT WITH SYNTHETIC SUBSTITUTE, CEMENTED, OPEN APPROACH: ICD-10-PCS | Performed by: ORTHOPAEDIC SURGERY

## 2017-09-19 PROCEDURE — 82962 GLUCOSE BLOOD TEST: CPT

## 2017-09-19 PROCEDURE — 97110 THERAPEUTIC EXERCISES: CPT

## 2017-09-19 PROCEDURE — 36415 COLL VENOUS BLD VENIPUNCTURE: CPT | Performed by: ORTHOPAEDIC SURGERY

## 2017-09-19 PROCEDURE — 84100 ASSAY OF PHOSPHORUS: CPT | Performed by: ORTHOPAEDIC SURGERY

## 2017-09-19 PROCEDURE — 74011000250 HC RX REV CODE- 250: Performed by: ORTHOPAEDIC SURGERY

## 2017-09-19 PROCEDURE — 74011250637 HC RX REV CODE- 250/637: Performed by: PHYSICIAN ASSISTANT

## 2017-09-19 PROCEDURE — 97165 OT EVAL LOW COMPLEX 30 MIN: CPT

## 2017-09-19 PROCEDURE — 97535 SELF CARE MNGMENT TRAINING: CPT

## 2017-09-19 PROCEDURE — 74011000258 HC RX REV CODE- 258: Performed by: ORTHOPAEDIC SURGERY

## 2017-09-19 PROCEDURE — 65270000029 HC RM PRIVATE

## 2017-09-19 PROCEDURE — 85610 PROTHROMBIN TIME: CPT | Performed by: ORTHOPAEDIC SURGERY

## 2017-09-19 PROCEDURE — 97116 GAIT TRAINING THERAPY: CPT

## 2017-09-19 PROCEDURE — 77010033678 HC OXYGEN DAILY

## 2017-09-19 RX ORDER — LORAZEPAM 0.5 MG/1
0.5 TABLET ORAL
Status: DISCONTINUED | OUTPATIENT
Start: 2017-09-19 | End: 2017-09-20 | Stop reason: HOSPADM

## 2017-09-19 RX ORDER — LANOLIN ALCOHOL/MO/W.PET/CERES
325 CREAM (GRAM) TOPICAL 2 TIMES DAILY WITH MEALS
Qty: 60 TAB | Refills: 2 | Status: SHIPPED | OUTPATIENT
Start: 2017-09-19 | End: 2018-01-23

## 2017-09-19 RX ORDER — WARFARIN 4 MG/1
8 TABLET ORAL
Status: COMPLETED | OUTPATIENT
Start: 2017-09-19 | End: 2017-09-19

## 2017-09-19 RX ORDER — ASPIRIN 325 MG
325 TABLET ORAL DAILY
Qty: 30 TAB | Refills: 0 | Status: SHIPPED | OUTPATIENT
Start: 2017-09-19 | End: 2018-01-23

## 2017-09-19 RX ORDER — ESCITALOPRAM OXALATE 10 MG/1
10 TABLET ORAL EVERY 24 HOURS
Status: DISCONTINUED | OUTPATIENT
Start: 2017-09-19 | End: 2017-09-20 | Stop reason: HOSPADM

## 2017-09-19 RX ORDER — TAMSULOSIN HYDROCHLORIDE 0.4 MG/1
0.4 CAPSULE ORAL
Status: COMPLETED | OUTPATIENT
Start: 2017-09-19 | End: 2017-09-19

## 2017-09-19 RX ORDER — OXYCODONE AND ACETAMINOPHEN 7.5; 325 MG/1; MG/1
1-2 TABLET ORAL
Qty: 60 TAB | Refills: 0 | Status: SHIPPED | OUTPATIENT
Start: 2017-09-19 | End: 2017-10-04 | Stop reason: SDUPTHER

## 2017-09-19 RX ORDER — TAMSULOSIN HYDROCHLORIDE 0.4 MG/1
0.4 CAPSULE ORAL DAILY
Status: DISCONTINUED | OUTPATIENT
Start: 2017-09-20 | End: 2017-09-20 | Stop reason: HOSPADM

## 2017-09-19 RX ORDER — CELECOXIB 200 MG/1
200 CAPSULE ORAL 2 TIMES DAILY
Qty: 60 CAP | Refills: 2 | Status: SHIPPED | OUTPATIENT
Start: 2017-09-19 | End: 2017-12-18

## 2017-09-19 RX ADMIN — PREGABALIN 50 MG: 50 CAPSULE ORAL at 09:49

## 2017-09-19 RX ADMIN — OXYCODONE HYDROCHLORIDE AND ACETAMINOPHEN 2 TABLET: 7.5; 325 TABLET ORAL at 11:04

## 2017-09-19 RX ADMIN — WARFARIN SODIUM 8 MG: 4 TABLET ORAL at 17:16

## 2017-09-19 RX ADMIN — Medication 325 MG: at 09:50

## 2017-09-19 RX ADMIN — CELECOXIB 200 MG: 100 CAPSULE ORAL at 17:16

## 2017-09-19 RX ADMIN — Medication 10 ML: at 22:00

## 2017-09-19 RX ADMIN — DOCUSATE SODIUM 100 MG: 100 CAPSULE, LIQUID FILLED ORAL at 18:00

## 2017-09-19 RX ADMIN — LORAZEPAM 0.5 MG: 0.5 TABLET ORAL at 09:49

## 2017-09-19 RX ADMIN — OXYCODONE HYDROCHLORIDE 20 MG: 20 TABLET, FILM COATED, EXTENDED RELEASE ORAL at 09:49

## 2017-09-19 RX ADMIN — CELECOXIB 200 MG: 100 CAPSULE ORAL at 09:49

## 2017-09-19 RX ADMIN — ESCITALOPRAM OXALATE 10 MG: 10 TABLET ORAL at 17:16

## 2017-09-19 RX ADMIN — TAMSULOSIN HYDROCHLORIDE 0.4 MG: 0.4 CAPSULE ORAL at 17:16

## 2017-09-19 RX ADMIN — OXYCODONE HYDROCHLORIDE 20 MG: 20 TABLET, FILM COATED, EXTENDED RELEASE ORAL at 21:34

## 2017-09-19 RX ADMIN — SODIUM CHLORIDE 600 MG: 900 INJECTION, SOLUTION INTRAVENOUS at 09:11

## 2017-09-19 RX ADMIN — PANTOPRAZOLE SODIUM 40 MG: 40 TABLET, DELAYED RELEASE ORAL at 17:16

## 2017-09-19 RX ADMIN — OXYCODONE HYDROCHLORIDE AND ACETAMINOPHEN 1 TABLET: 7.5; 325 TABLET ORAL at 19:26

## 2017-09-19 RX ADMIN — LEVOTHYROXINE SODIUM 88 MCG: 88 TABLET ORAL at 09:50

## 2017-09-19 RX ADMIN — PANTOPRAZOLE SODIUM 40 MG: 40 TABLET, DELAYED RELEASE ORAL at 09:50

## 2017-09-19 RX ADMIN — PREGABALIN 50 MG: 50 CAPSULE ORAL at 17:16

## 2017-09-19 RX ADMIN — Medication 325 MG: at 17:16

## 2017-09-19 RX ADMIN — Medication 10 ML: at 05:38

## 2017-09-19 RX ADMIN — DOCUSATE SODIUM 100 MG: 100 CAPSULE, LIQUID FILLED ORAL at 09:50

## 2017-09-19 NOTE — PROGRESS NOTES
Rounded on patient. Reinforced importance of getting OOB for all meals, going to bathroom to help prevent blood clots. Reviewed pain medications patient is taking and the importance of keeping pain under control to help with getting OOB and therapy. Discussed the importance of keeping ice on surgery site when in bed or chair to decrease swelling. .. Encouraged patient monitor for constipation and to take a stool softner/laxative while recovering on pain medication. Also reviewed how to use incentive spirometer with return demonstration by patient. Discussed pain medication, bowel medication with patient. Finally, educated patient on the importance of eating three well balanced meals a day with protein to promote bone/muscle healing. Reminded patient to drink lots of fluids to protect kidneys from all the medications being taken currently with recovery. Patient verbalizing understanding. Dressing to surgical site dry and intact. Ice in place per protocol. Call light in reach. Patient reminded to call for help to get OOB or when leaving bathroom for safety. Patient given the opportunity for asking questions. Asked patient if there is anything they need.      Orthopedic

## 2017-09-19 NOTE — PROGRESS NOTES
Hemovac drain dc'd from right knee without difficulty. Aquacel Ag dressing intact to incision line with scant amount serosanguinous drainage noted on dressing. Ice pack reapplied to knee. Tolerated well by patient.

## 2017-09-19 NOTE — PROGRESS NOTES
Bedside and Verbal shift change report given to Kasey Butcher RN (oncoming nurse) by Rc Tsai RN (offgoing nurse). Report included the following information SBAR, Kardex, MAR and Recent Results. SITUATION:    Code Status: No Order   Reason for Admission: Osteoarthritis of right knee, unspecified osteoarthritis type [address] day: 1   Problem List:       Hospital Problems  Date Reviewed: 9/16/2017          Codes Class Noted POA    Arthritis of knee ICD-10-CM: M17.10  ICD-9-CM: 716.96  9/18/2017 Unknown              BACKGROUND:    Past Medical History:   Past Medical History:   Diagnosis Date    Breast cyst 1994    benign, removed    Deviated septum 04/2010    septoplasty    Diabetes mellitus     Type 2    GERD (gastroesophageal reflux disease)     severe    Hypertension     Hypothyroidism     Other elective surgery July 2008    toe surgery    Pure hypercholesterolemia     Stress thallium 09/30/2009    No evidence of ischemia or infarction; EF 78%; EKG portion negative with exercise capacity below average for age at 6 min of exercise    Varicose veins     closure in right leg 2008 & left leg 2007    Venous reflux study 06/24/2014    No DVT. [facility] occlusions of bilateral GSV.          Patient taking anticoagulants yes     ASSESSMENT:    Changes in Assessment Throughout Shift: none     Patient has Central Line: no Reasons if yes:      Patient has Caal Cath: yes Reasons if yes: urinary retention      Last Vitals:     Vitals:    09/18/17 1550 09/18/17 1958 09/18/17 2314 09/19/17 0506   BP: 131/70 103/65 120/74 110/55   Pulse: 62 (!) 59 60 (!) 56   Resp: 14 16 16 16   Temp: 97.6 °F (36.4 °C) 97.7 °F (36.5 °C) 97.5 °F (36.4 °C) 97.6 °F (36.4 °C)   SpO2: 99% 94% 96% 100%   Weight:       Height:            IV and DRAINS (will only show if present)   Peripheral IV 09/18/17 Left Hand-Site Assessment: Clean, dry, & intact  Samuel-Walker Drain 09/18/17 Right Knee-Site Assessment: Clean, dry, & intact     WOUND (if present)   Wound Type:  surgical   Dressing present Dressing Present : Yes   Wound Concerns/Notes:  none     PAIN    Pain Assessment    Pain Intensity 1: 0 (09/19/17 0506)    Pain Location 1: Knee    Pain Intervention(s) 1: Repositioned    Patient Stated Pain Goal: 10  o Interventions for Pain:  medication  o Intervention effective: yes  o Time of last intervention: see mar   o Reassessment Completed: yes      Last 3 Weights:  Last 3 Recorded Weights in this Encounter    09/06/17 1018 09/18/17 0609   Weight: 98 kg (216 lb) 100 kg (220 lb 6 oz)     Weight change:      INTAKE/OUPUT    Current Shift:      Last three shifts: 09/17 1901 - 09/19 0700  In: 5946.7 [P.O.:580; I.V.:5366.7]  Out: 1770 [Urine:1500; Drains:170]     LAB RESULTS     Recent Labs      09/19/17   0428   WBC  7.5   HGB  10.1*   HCT  32.7*   PLT  213        Recent Labs      09/19/17   0428   NA  140   K  4.3   GLU  125*   BUN  22*   CREA  1.15   CA  8.1*   MG  2.2   INR  1.1       RECOMMENDATIONS AND DISCHARGE PLANNING     1. Pending tests/procedures/ Plan of Care or Other Needs: pain management, PT/OT     2. Discharge plan for patient and Needs/Barriers:     3. Estimated Discharge Date: 9/20/17 Posted on Whiteboard in [address] [place] [address] Room: yes      4. The patient's care plan was reviewed with the oncoming nurse. \"HEALS\" SAFETY CHECK      Fall Risk    Total Score: 3    Safety Measures: Safety Measures: Bed/Chair-Wheels locked, Bed in low position, Call light within reach, Family at bedside, Gripper socks, Fall prevention (comment)    A safety check occurred in the patient's room between off going nurse and oncoming nurse listed above.     The safety check included the below items  Area Items   H  High Alert Medications - Verify all high alert medication drips (heparin, PCA, etc.)   E  Equipment - Suction is set up for ALL patients (with yanker)  - Red plugs utilized for all equipment (IV pumps, etc.)  - WOWs wiped down at end of shift.  - Room stocked with oxygen, suction, and other unit-specific supplies   A  Alarms - Bed alarm is set for fall risk patients  - Ensure chair alarm is in place and activated if patient is up in a chair   L  Lines - Check IV for any infiltration  - Caal bag is empty if patient has a Caal   - Tubing and IV bags are labeled   S  Safety   - Room is clean, patient is clean, and equipment is clean. - Hallways are clear from equipment besides carts. - Fall bracelet on for fall risk patients  - Ensure room is clear and free of clutter  - Suction is set up for ALL patients (with yanker)  - Hallways are clear from equipment besides carts.    - Isolation precautions followed, supplies available outside room, sign posted     Yaa Mccullough RN

## 2017-09-19 NOTE — PROGRESS NOTES
Mobility Intervention:       [] Pt dangled at edge of bed    [] Pt assisted OOB to bedside commode    [x] Pt assisted OOB to chair    [] Pt ambulated to bathroom    [] Patient was ambulated in room/hallway    Assistive Device Utilized (check all that apply):       [x] Rolling walker   [] Crutches   [] Straight Cane   [] Knee immobilizer   [] IV pole    After Mobilization:     [] Patient left in no apparent distress sitting up in chair  [x] Patient left in no apparent distress in bed  [x] Call bell left within reach  Assistive Device:        [] RN notified  [] Caregiver present  [] Bed alarm activated    Comments:

## 2017-09-19 NOTE — PROGRESS NOTES
Patient is alert and oriented times three no signs or symptoms of distress, no chest pain, no SOB, dressing to right knee CDI, polar ice in place, pedal pulses palpable to bilateral lower extremities. In stable condition.

## 2017-09-19 NOTE — OP NOTES
[address]    Name:  Deborah Stratton  MR#:  [id number]  :  1950  Account #:  [de-identified]  Date of Adm:  2017  Date of Surgery:  2017      PREOPERATIVE DIAGNOSIS: End-stage arthritis, right knee. POSTOPERATIVE DIAGNOSIS: End-stage arthritis, right knee. PROCEDURES PERFORMED: Right total knee replacement using the  [employer] system with a size 5 right posterior stabilized femoral  component, size 5 tibia, 11 mm posterior stabilized insert and a 32 mm  asymmetric patella. COMPLICATIONS: None. SPECIMENS REMOVED: None. ESTIMATED BLOOD LOSS: 50 mL. SURGEON: Ruthann Contreras MD.    FIRST ASSISTANT: Jessie Chicas. SECOND ASSISTANT: Isaac Brown. ANESTHESIOLOGIST: Dr. Mercedes Patel. ANESTHESIA: Preoperative femoral nerve block with light general.    DESCRIPTION OF PROCEDURE: After the anesthetic was  successfully induced, it was confirmed the patient did receive  preoperative antibiotics and Coumadin. Appropriate timeout was  performed. Antibiotics were confirmed given. Midline incision, knee debrided in the usual fashion. Femoral canal  aspirated, lavaged, and reaspirated prior to instrumentation, cut for 5  degrees to the appropriate side. The crab claw was utilized to prevent  undercutting. All cuts checked for trueness and squareness and all soft  tissue structures protected during the sawing process. The knee would  be gap balanced and we paid particular attention to correct femoral  rotation. After making preliminary femoral cuts, we switched our  attention to the tibia, used the external alignment guide with  appropriate landmarks and resected enough to get a decent cleanup  cut, ensuring no skive and neutral alignment. Removed posterior osteophytes while protecting neurovascular bundle  and used the Aquamantys and Exparel cocktail.  We then gap balanced  the knee between medial lateral and flexion extension, increasing the  medial release slightly and thus confirming correct femoral rotation. We  then did our femoral finishing, followed by placement of the trial  components to set our tibial rotation, which was marked and later  repunched. We then resurfaced the patella, restoring patellar thickness  anatomically and using a rongeur to smooth out the edges. With all the trial components in place, we checked the overall  alignment, range of motion, soft tissue balance, patellar tracking,  stability and alignment, all of which we were delighted with. Fashioned  a bone plug for the femoral canal, further controlled hemostasis,  cemented in the knee, removing all extraneous cement and holding the  knee in full extension until the cement was fully cured. Further cement  removal, further pulse lavage, further trialing. I was happiest with the  11, locked it in place, again reducing the knee, releasing the tourniquet  and checking again patellar tracking, stability and alignment, all of  which we were delighted with. The range of motion excellent. Routine  closure, fully flexed the knee prior to closure of the skin and the patient  was brought to the recovery room in stable condition without  complication. Instrument, sponge and needle count was correct.         Ferny Schuster MD AM / Mena Kwan  D:  09/19/2017   09:54  T:  09/19/2017   10:41  Job #:  [id number]

## 2017-09-19 NOTE — PROGRESS NOTES
Problem: Self Care Deficits Care Plan (Adult)  Goal: *Acute Goals and Plan of Care (Insert Text)  Occupational Therapy Goals  Initiated 9/19/2017     1. Patient will perform simulated lower body dressing with supervision/SBA with adaptive strategies. -achieved 9/19/17  Outcome: Resolved/Met Date Met:  09/19/17  OCCUPATIONAL THERAPY EVALUATION/DISCHARGE     Patient: Keaton Alfonso (89 y.o. female)  Date: 9/19/2017  Primary Diagnosis: Osteoarthritis of right knee, unspecified osteoarthritis type [M17.11]  Procedure(s) (LRB):  RIGHT TOTAL KNEE ARTHROPLASTY (Right) 1 Day Post-Op   Precautions:   Fall, WBAT      ASSESSMENT AND RECOMMENDATIONS:  Based on the objective data described below, the patient needed SBA with functional mobility/simulated toilet transfer with rolling walker. Patient had WFL BUE AROM and strength. Patient was able to doff/meliton bilateral socks seated in chair with supervision. Educated patient on adaptive strategies for LE dressing; patient was able to simulate donning/doffing underwear with SBA. Patient deferred to PT for mobility. Skilled acute care occupational therapy is not indicated at this time. Discharge Recommendations: Home Health  Further Equipment Recommendations for Discharge: N/A       Barriers to Learning/Limitations: None      COMPLEXITY      Eval Complexity: History: LOW Complexity : Brief history review ; Examination: LOW Complexity : 1-3 performance deficits relating to physical, cognitive , or psychosocial skils that result in activity limitations and / or participation restrictions ; Decision Making:LOW Complexity : No comorbidities that affect functional and no verbal or physical assistance needed to complete eval tasks  Assessment: Low Complexity          G-CODES:      Self Care  Current  CI= 1-19%   Goal  CI= 1-19%   D/C  CI= 1-19%. The severity rating is based on the Level of Assistance required for Functional Mobility and ADLs.       SUBJECTIVE: Patient stated It hurts less then I thought it would.       OBJECTIVE DATA SUMMARY:       Past Medical History:   Diagnosis Date    Breast cyst 1994     benign, removed    Deviated septum 04/2010     septoplasty    Diabetes mellitus       Type 2    GERD (gastroesophageal reflux disease)       severe    Hypertension      Hypothyroidism      Other elective surgery July 2008     toe surgery    Pure hypercholesterolemia      Stress thallium 09/30/2009     No evidence of ischemia or infarction; EF 78%; EKG portion negative with exercise capacity below average for age at 6 min of exercise    Varicose veins       closure in right leg 2008 & left leg 2007    Venous reflux study 06/24/2014     No DVT. [facility] occlusions of bilateral GSV. Past Surgical History:   Procedure Laterality Date    CYSTOTOMY,EXCIS BLADDER TUMOR   2006    HX HYSTERECTOMY        HX PARTIAL HYSTERECTOMY   1994    VA EGD DELIVER THERMAL ENERGY SPHNCTR/CARDIA GERD        THYROIDECTOMY   1992     Prior Level of Function/Home Situation: Patient reported she was independent in basic self care tasks and functional mobility PTA.   Home Situation  Home Environment: Private residence  # Steps to Enter: 5  One/Two Story Residence: Other (Comment) (3 stories, sleeps on 3rd floor)  # of Interior Steps: 10  Interior Rails: Both  Lift Chair Available: Yes  Living Alone: No  Support Systems: Spouse/Significant Other/Partner  Patient Expects to be Discharged to[de-identified] Private residence  Current DME Used/Available at Home: Cane, straight, Raised toilet seat, Shower chair, Walker, rollator  Tub or Shower Type: Shower  [X]     Right hand dominant       [ ]     Left hand dominant  Cognitive/Behavioral Status:  Neurologic State: Alert  Orientation Level: Oriented X4  Cognition: Follows commands  Safety/Judgement: Fall prevention     Skin: No skin changes noted     Edema: No edema noted     Vision/Perceptual:       Acuity: Within Defined Limits Coordination:  Coordination: Within functional limits (BUEs)       Balance:  Sitting: Intact  Standing: Impaired; With support (rolling walker)  Standing - Static: Good  Standing - Dynamic : Fair     Strength:  Strength: Within functional limits (BUEs)     Tone & Sensation:  Tone: Normal (BUEs)  Sensation: Intact (BUEs)     Range of Motion:  AROM: Within functional limits (BUEs)     Functional Mobility and Transfers for ADLs:  Bed Mobility:  Patient in chair upon arrival  Scooting: Supervision  Transfers:  Sit to Stand: Contact guard assistance              Toilet Transfer : Stand-by asssistance (simulated with rolling walker)     ADL Assessment:(clinical judgement)  Feeding: Independent     Oral Facial Hygiene/Grooming: Stand-by assistance     Bathing: Stand-by assistance     Upper Body Dressing: Independent     Lower Body Dressing: Stand-by assistance     Toileting: Stand by assistance     Pain:  Pt reports 7/10 pain or discomfort prior to treatment. Pt reports 8/10 pain or discomfort post treatment. Activity Tolerance:   Good     Please refer to the flowsheet for vital signs taken during this treatment. After treatment:   [X]  Patient left in no apparent distress sitting up in chair  [ ]  Patient left in no apparent distress in bed  [X]  Call bell left within reach  [X]  Nursing notified  [ ]  Caregiver present  [ ]  Bed alarm activated      COMMUNICATION/EDUCATION:   Communication/Collaboration:  [ ]      Home safety education was provided and the patient/caregiver indicated understanding. [ ]      Patient/family have participated as able and agree with findings and recommendations. [ ]      Patient is unable to participate in plan of care at this time.      Hayley Rodriguez OTR/L  Time Calculation: 23 mins

## 2017-09-19 NOTE — PROGRESS NOTES
Problem: Mobility Impaired (Adult and Pediatric)  Goal: *Acute Goals and Plan of Care (Insert Text)  Physical Therapy Goals  Initiated 9/18/2017 and to be accomplished within 7 day(s)  1. Patient will move from supine to sit and sit to supine , scoot up and down and roll side to side in bed with modified independence. 2. Patient will transfer from bed to chair and chair to bed with modified independence using the least restrictive device. 3. Patient will perform sit to stand with modified independence. 4. Patient will ambulate with modified independence for >200 feet with the least restrictive device. 5. Patient will ascend/descend 8 stairs with 1 handrail(s) with supervision/set-up. 6. Patient will perform HEP independently and increased ROM and strength for carryover into functional tasks. Outcome: Progressing Towards Goal  PHYSICAL THERAPY TREATMENT     Patient: Merary Angelo (72 y.o. female)  Date: 9/19/2017  Diagnosis: Osteoarthritis of right knee, unspecified osteoarthritis type [M17.11] <principal problem not specified>  Procedure(s) (LRB):  RIGHT TOTAL KNEE ARTHROPLASTY (Right) 1 Day Post-Op  Precautions:    Chart, physical therapy assessment, plan of care and goals were reviewed. ASSESSMENT:  Pt doing well this morning, instructed in LE exercises for strength and ROM, educated on importance of compliance. Pt progressing well with gait training, instructed with verbal cues for safety using RW during sit<>stand transfers, sequencing during gait training. Progression toward goals:  [X]      Improving appropriately and progressing toward goals  [ ]      Improving slowly and progressing toward goals  [ ]      Not making progress toward goals and plan of care will be adjusted       PLAN:  Patient continues to benefit from skilled intervention to address the above impairments. Continue treatment per established plan of care.   Discharge Recommendations:  Home Health  Further Equipment Recommendations for Discharge:  rolling walker       SUBJECTIVE:   Patient stated It doesn't hurt like I thought it would.       OBJECTIVE DATA SUMMARY:   Critical Behavior:  Neurologic State: Alert  Orientation Level: Appropriate for age  Cognition: Appropriate decision making  Safety/Judgement: Fall prevention  Functional Mobility Training:  Bed Mobility:  Rolling: Supervision  Supine to Sit: Supervision  Scooting: Supervision  Transfers:  Sit to Stand: Contact guard assistance  Stand to Sit: Contact guard assistance  Balance:  Sitting: Intact  Standing: Impaired  Standing - Static: Good  Standing - Dynamic : Fair  Ambulation/Gait Training:  Distance (ft): 16 Feet (ft)  Assistive Device: Walker, rolling  Ambulation - Level of Assistance: Stand-by asssistance  Gait Abnormalities: Antalgic;Decreased step clearance; Step to gait  Therapeutic Exercises: Ankle pumps, quad set, glute squeeze, hip abd, heel slides  Pain:  Pt pain was reported as  4 pre-treatment. Pt pain was reported as 4 post-treatment. Intervention: position     Activity Tolerance:   Good   Please refer to the flowsheet for vital signs taken during this treatment.   After treatment:   [X] Patient left in no apparent distress sitting up in chair  [ ] Patient left in no apparent distress in bed  [X] Call bell left within reach  [ ] Nursing notified  [ ] Caregiver present  [ ] Bed alarm activated      Ramon Ayala PTA   Time Calculation: 26 mins

## 2017-09-19 NOTE — DISCHARGE SUMMARY
9/18/2017  5:40 AM    9/20/2017, 9:47 AM    Primary Dx:right Orthopedic / Rheumatologic: Total Knee Replacement  Secondary Dx: Etiological Diagnoses: none    HPI:  Pt has end stage OA and had failed conservative treatment. Due to the current findings and affected activity of daily living surgical intervention is indicated. The alternatives, risks, complications as well as expected outcome were discussed, the patient understands and wishes to proceed with surgery    Past Medical History:   Diagnosis Date    Breast cyst 1994    benign, removed    Deviated septum 04/2010    septoplasty    Diabetes mellitus     Type 2    GERD (gastroesophageal reflux disease)     severe    Hypertension     Hypothyroidism     Other elective surgery July 2008    toe surgery    Pure hypercholesterolemia     Stress thallium 09/30/2009    No evidence of ischemia or infarction; EF 78%; EKG portion negative with exercise capacity below average for age at 6 min of exercise    Varicose veins     closure in right leg 2008 & left leg 2007    Venous reflux study 06/24/2014    No DVT. [facility] occlusions of bilateral GSV.          Current Facility-Administered Medications:     escitalopram oxalate (LEXAPRO) tablet 10 mg, 10 mg, Oral, DAILY, Beck Molina MD, Stopped at 09/18/17 1300    LORazepam (ATIVAN) tablet 0.5 mg, 0.5 mg, Oral, BID, Beck Molina MD, 0.5 mg at 09/18/17 1749    losartan (COZAAR) tablet 100 mg, 100 mg, Oral, DAILY, Beck Molina MD, Stopped at 09/18/17 1300    metoprolol succinate (TOPROL-XL) tablet 100 mg, 100 mg, Oral, DAILY, January-Susanne JULIEN PA-C    pantoprazole (PROTONIX) tablet 40 mg, 40 mg, Oral, BID, Beck Molina MD, 40 mg at 09/18/17 1748    0.9% sodium chloride infusion, 100 mL/hr, IntraVENous, CONTINUOUS, Beck Molina MD, Last Rate: 100 mL/hr at 09/18/17 2311, 100 mL/hr at 09/18/17 2311    sodium chloride (NS) flush 5-10 mL, 5-10 mL, IntraVENous, Q8H, Beck Molina MD, 10 mL at 09/19/17 1579    sodium chloride (NS) flush 5-10 mL, 5-10 mL, IntraVENous, PRN, Kehinde Mendez MD    naloxone [facility]) injection 0.4 mg, 0.4 mg, IntraVENous, PRN, Kehinde Mendez MD    ferrous sulfate tablet 325 mg, 1 Tab, Oral, BID WITH MEALS, Kehinde Mendez MD, 325 mg at 09/18/17 1627    diphenhydrAMINE (BENADRYL) injection 12.5 mg, 12.5 mg, IntraVENous, Q6H PRN, Kehinde Mendez MD    zolpidem (AMBIEN) tablet 5 mg, 5 mg, Oral, QHS PRN, Kehinde Mendez MD    celecoxib (CELEBREX) capsule 200 mg, 200 mg, Oral, BID, Kehinde Mendez MD, 200 mg at 09/18/17 1748    oxyCODONE-acetaminophen (PERCOCET 7.5) 7.5-325 mg per tablet 1-2 Tab, 1-2 Tab, Oral, Q4H PRN, Kehinde Mendez MD    ondansetron [facility]) injection 4 mg, 4 mg, IntraVENous, Q4H PRN, Kehinde Mendez MD, 4 mg at 09/18/17 2006    docusate sodium (COLACE) capsule 100 mg, 100 mg, Oral, BID, Kehinde Mendez MD, 100 mg at 09/18/17 1748    pregabalin (LYRICA) capsule 50 mg, 50 mg, Oral, BID, Kehinde Mednez MD, 50 mg at 09/18/17 1749    oxyCODONE ER (OxyCONTIN) tablet 20 mg, 20 mg, Oral, Q12H, Kehinde Mendez MD, Stopped at 09/18/17 1753    WARFARIN INFORMATION NOTE (COUMADIN), , Other, Q24H, Kehinde Mendez MD    insulin lispro (HUMALOG) injection, , SubCUTAneous, AC&HS, January-Susanne JULIEN PA-C, Stopped at 09/18/17 1630    glucose chewable tablet 16 g, 4 Tab, Oral, PRN, January-Susanne JULIEN PA-C    glucagon (GLUCAGEN) injection 1 mg, 1 mg, IntraMUSCular, PRN, January-Susanne JULIEN PA-C    dextrose (D50W) injection syrg 12.5-25 g, 25-50 mL, IntraVENous, PRN, January-Susanne JULIEN PA-C    levothyroxine (SYNTHROID) tablet 88 mcg, 88 mcg, Oral, ACB, January-Susanne JULIEN PA-C      Pcn [penicillins]    Physical Exam:  General A&O x3 NAD, well developed, well nourished, normal affect  Heart: S1-S2, RRR  Lungs: CTA Bilat  Abd: soft NT, ND  Ext: n/v intact    Hospital Course:    Pt. Had rightOrthopedic / Rheumatologic: Total Knee Replacement    Post -op Course:   The patient tolerated the procedure well. They were followed by internal medicine for help with medical management. Pt. Was place on Abx pre and post-op for prophylaxis against infection as well as coumadin pre and post-op for prophylaxis against DVT. Vitals signs remained stable, remained af. The wound wasclean, dry, no drainage. Pain was well controlled. Pt. Had negative calf tenderness or swelling, no evidence for DVT. Patient had PT/OT consult for evaluation and treatment. CBC  Lab Results   Component Value Date/Time    WBC 7.5 09/19/2017 04:28 AM    RBC 3.54 (L) 09/19/2017 04:28 AM    HCT 32.7 (L) 09/19/2017 04:28 AM    MCV 92.4 09/19/2017 04:28 AM    MCH 28.5 09/19/2017 04:28 AM    MCHC 30.9 (L) 09/19/2017 04:28 AM    RDW 15.1 (H) 09/19/2017 04:28 AM     Coagulation  Lab Results   Component Value Date    INR 1.1 09/19/2017      Basic Metabolic Profile  Lab Results   Component Value Date     09/19/2017    CO2 25 09/19/2017    BUN 22 (H) 09/19/2017       Discharge Plan:  The patient will be d/c'd to home, total knee protocol, }WBAT. [facility]. Total joint protocol. Pt safe for homebound transfer, sp Total joint replacement. A walker, bedside commode, and shower chair will be utilized for ADL's. Follow up with Dr. Gissel Gan in 10-12 days. Call with any questions or concerns.

## 2017-09-19 NOTE — PROGRESS NOTES
Ortho    Pt. Seen and evaluated. Doing well, pain well controlled  Denies cp, sob, abd pain    Blood pressure 143/65, pulse (!) 50, temperature 97.6 °F (36.4 °C), resp. rate 16, height 5' 7\" (1.702 m), weight 220 lb 6 oz (100 kg), SpO2 95 %.    rightKnee woundclean, dry, no drainage  Sensory intact to LT  Motor intact  nv intact  Neg calf tenderness    Labs:  CBC  @  CBC:   Lab Results   Component Value Date/Time    WBC 7.5 09/19/2017 04:28 AM    RBC 3.54 09/19/2017 04:28 AM    HGB 10.1 09/19/2017 04:28 AM    HCT 32.7 09/19/2017 04:28 AM    PLATELET 127 57/98/4232 04:28 AM     BMP:   Lab Results   Component Value Date/Time    Glucose 125 09/19/2017 04:28 AM    Sodium 140 09/19/2017 04:28 AM    Potassium 4.3 09/19/2017 04:28 AM    Chloride 108 09/19/2017 04:28 AM    CO2 25 09/19/2017 04:28 AM    BUN 22 09/19/2017 04:28 AM    Creatinine 1.15 09/19/2017 04:28 AM    Calcium 8.1 09/19/2017 04:28 AM   @  Coagulation  Lab Results   Component Value Date    INR 1.1 09/19/2017      Basic Metabolic Profile  Lab Results   Component Value Date     09/19/2017    CO2 25 09/19/2017    BUN 22 (H) 09/19/2017       Assesment: right Orthopedic / Rheumatologic: Total Knee Replacement    Plan: coumadin, PT

## 2017-09-19 NOTE — CONSULTS
[facility]  Pulmonary, Critical Care, and Sleep Medicine      Name: Michelle Madrid MRN: [id number]   : 1950 Hospital: [address] [place] [address] Dr   Date: 2017          Initial Patient Consult    Requesting MD: Dr. Kristina Farley:   · Osteoarthritis of right knee, s/p right Total Knee Replacement (Day 0)  · Bradycardia-asymptomatic, possibly secondary to anesthesia and metoprolol prior to surgery  · Hx hypothyroidism s/p thyroidectomy; DM  · Hx HTN  · Hx GERD  · H/O Urinary retentin      RECOMMENDATIONS:   Encourage incentive spirometry; mobilize once ok with Ortho. Actively wean oxygen supplementation to d/c, SpO2 goal >92%  Received elaine-operative antibiotics - clindamycin (with end date 17). Monitor for fevers, leukocytosis   Hemodynamically stable however with bradycardia post-operatively - Continue holding parameters (Hold if HR is 60 and below, or SBP < 105 mmHg) for lopressor  Continue levothyroxine in am  D/C IVF hydration  Stool softener given narcotic use  Diet ordered  Pain management and anticoagulation per orthopedic service  Wound care  Labs: CBC, BMP, Mg, Phos in am  GI proph: protonix for symptomatic GERD  Monitor UOP and due to void once parker out  Will follow from medicine perspective- further recommendations to follow       Subjective/History: This patient has been seen and evaluated at the request of Dr. Harry Garrett following total knee replacement  Patient is a 61 y.o. female with hx of DM, GERD, HTN, hypothyroidism, osteoarthritis, who has been seen by Ortho service for right knee pain and has failed conservative medical management for OA of right knee. Pt underwent scheduled TKR today under general and regional (femoral block) anesthesia. Pt was extubated and transferred to  17. Pt's right knee has surgical dressing with cooling device over knee; also has right knee drain.     Patient resting in bed at time of exam. States she feels much better today. She has return of sensation to right leg. She does note some mild nausea but no emesis. Light-headed feeling from yesterday has resolved. Had not gotten up to ambulate yet at time of my evaluation. Resting heat rate in the high 40s- 50's and per chart review has remained in the 50's throughout the day. Afebrile. H/H ~ 10/30. Caal still in at time of my exam- patient reports difficulties with urinary retention. Had been undergoing treatments but stopped. Past Medical History:   Diagnosis Date    Breast cyst 1994    benign, removed    Deviated septum 04/2010    septoplasty    Diabetes mellitus     Type 2    GERD (gastroesophageal reflux disease)     severe    Hypertension     Hypothyroidism     Other elective surgery July 2008    toe surgery    Pure hypercholesterolemia     Stress thallium 09/30/2009    No evidence of ischemia or infarction; EF 78%; EKG portion negative with exercise capacity below average for age at 6 min of exercise    Varicose veins     closure in right leg 2008 & left leg 2007    Venous reflux study 06/24/2014    No DVT. [facility] occlusions of bilateral GSV. Past Surgical History:   Procedure Laterality Date    CYSTOTOMY,EXCIS BLADDER TUMOR  2006    HX HYSTERECTOMY      HX PARTIAL HYSTERECTOMY  1994    ID EGD DELIVER THERMAL ENERGY SPHNCTR/CARDIA GERD      THYROIDECTOMY  1992      Prior to Admission medications    Medication Sig Start Date End Date Taking? Authorizing Provider   celecoxib (CELEBREX) 200 mg capsule Take 1 Cap by mouth two (2) times a day for 90 days. 9/19/17 12/18/17 Yes Randi Rivers PA-C   ferrous sulfate 325 mg (65 mg iron) tablet Take 1 Tab by mouth two (2) times daily (with meals). 9/19/17  Yes Randi Rivers PA-C   oxyCODONE-acetaminophen (PERCOCET 7.5) 7.5-325 mg per tablet Take 1-2 Tabs by mouth every four (4) hours as needed. Max Daily Amount: 12 Tabs.  9/19/17  Yes Randi Rivers PA-C aspirin (ASPIRIN) 325 mg tablet Take 1 Tab by mouth daily. 9/19/17  Yes Mary Guillen PA-C   levoFLOXacin (LEVAQUIN) 500 mg tablet 1 po q day x 5 days 9/13/17  Yes Mary Guillen PA-C   cetirizine (ZYRTEC) 10 mg tablet Take 10 mg by mouth daily. Yes Historical Provider   metoprolol succinate (TOPROL-XL) 100 mg tablet TAKE ONE TABLET BY MOUTH DAILY 4/1/17  Yes Parris Buitrago DO   losartan (COZAAR) 100 mg tablet Take 100 mg by mouth daily. Yes Historical Provider   escitalopram oxalate (LEXAPRO) 10 mg tablet Take 10 mg by mouth daily. Yes Historical Provider   pantoprazole (PROTONIX) 40 mg tablet Take 40 mg by mouth two (2) times a day. 6/8/14  Yes Historical Provider   metformin (GLUCOPHAGE) 500 mg tablet Take 500 mg by mouth daily (with breakfast). Yes Historical Provider   levothyroxine (LEVOXYL) 88 mcg tablet Take 88 mcg by mouth daily. Yes Historical Provider   lorazepam (ATIVAN) 1 mg tablet Take 0.5 mg by mouth two (2) times a day. Yes Historical Provider   ERGOCALCIFEROL, VITAMIN D2, (VITAMIN D PO) Take 50,000 Units by mouth every Monday.    Yes Historical Provider     Current Facility-Administered Medications   Medication Dose Route Frequency    escitalopram oxalate (LEXAPRO) tablet 10 mg  10 mg Oral Q24H    losartan (COZAAR) tablet 100 mg  100 mg Oral DAILY    metoprolol succinate (TOPROL-XL) tablet 100 mg  100 mg Oral DAILY    pantoprazole (PROTONIX) tablet 40 mg  40 mg Oral BID    sodium chloride (NS) flush 5-10 mL  5-10 mL IntraVENous Q8H    ferrous sulfate tablet 325 mg  1 Tab Oral BID WITH MEALS    celecoxib (CELEBREX) capsule 200 mg  200 mg Oral BID    docusate sodium (COLACE) capsule 100 mg  100 mg Oral BID    pregabalin (LYRICA) capsule 50 mg  50 mg Oral BID    oxyCODONE ER (OxyCONTIN) tablet 20 mg  20 mg Oral Q12H    WARFARIN INFORMATION NOTE (COUMADIN)   Other Q24H    insulin lispro (HUMALOG) injection   SubCUTAneous AC&HS    levothyroxine (SYNTHROID) tablet 88 mcg  88 mcg Oral ACB     Allergies   Allergen Reactions    Pcn [Penicillins] Unknown (comments)      Social History   Substance Use Topics    Smoking status: Never Smoker    Smokeless tobacco: Never Used    Alcohol use No      Family History   Problem Relation Age of Onset    Heart Attack Maternal Grandmother     Hypertension Mother         Review of Systems:  A comprehensive review of systems was negative except for that written in the HPI. Objective:   Vital Signs:    Visit Vitals    /83 (BP 1 Location: Left arm, BP Patient Position: Sitting)    Pulse (!) 54    Temp 97.4 °F (36.3 °C)    Resp 16    Ht 5' 7\" (1.702 m)    Wt 100 kg (220 lb 6 oz)    SpO2 97%    BMI 34.52 kg/m2       O2 Device: Nasal cannula   O2 Flow Rate (L/min): 2 l/min   Temp (24hrs), Av.6 °F (36.4 °C), Min:97.4 °F (36.3 °C), Max:97.7 °F (36.5 °C)       Intake/Output:   Last shift:      701 - 1900  In: -   Out: 50 [Drains:50]  Last 3 shifts: 1901 -  0700  In: 5946.7 [P.O.:580; I.V.:5366.7]  Out: 1770 [Urine:1500; Drains:170]    Intake/Output Summary (Last 24 hours) at 17 1428  Last data filed at 17 1020   Gross per 24 hour   Intake          2446.66 ml   Output             1720 ml   Net           726.66 ml         Physical Exam:    General:  Alert, cooperative, no distress, appears stated age. Head:  Normocephalic, without obvious abnormality, atraumatic. Eyes:  Conjunctivae/corneas clear. PERRL, EOMs intact. Nose: Nares normal. No drainage    Throat: Lips, mucosa, and tongue normal. Teeth and gums normal.   Neck: Supple, symmetrical, trachea midline, no adenopathy, thyroid: no enlargment/tenderness/nodules, no carotid bruit and no JVD. Back:   Symmetric, no curvature. ROM normal.   Lungs:   Clear to auscultation bilaterally. Chest wall:  No tenderness or deformity. Heart:  Regular rate and rhythm, S1, S2 normal   Abdomen:   Soft, non-tender.  Bowel sounds normal.    Extremities: R knee with surgical dressing - no gross bleeding; + wound drain to right knee; LLE no edema/cyanosis- Hemovac in place and cooling bandage also in place over surgical site   Pulses: 2+ and symmetric all extremities.    Skin: Skin color, texture, turgor normal. No rashes or lesions   Lymph nodes: Cervical, supraclavicular nodes normal.   Neurologic: Grossly nonfocal       Data:     Recent Results (from the past 24 hour(s))   EKG, 12 LEAD, INITIAL    Collection Time: 09/18/17  2:51 PM   Result Value Ref Range    Ventricular Rate 59 BPM    Atrial Rate 59 BPM    P-R Interval 158 ms    QRS Duration 72 ms    Q-T Interval 454 ms    QTC Calculation (Bezet) 449 ms    Calculated R Axis -26 degrees    Calculated T Axis 157 degrees    Diagnosis       Sinus bradycardia  Low voltage QRS  Nonspecific T wave abnormality  Abnormal ECG  When compared with ECG of 06-SEP-2017 11:49,  Nonspecific T wave abnormality, worse in Inferior leads  Nonspecific T wave abnormality now evident in Lateral leads  Confirmed by Michelle Wright MD, Jennifer Berger ([id number]) on 9/19/2017 10:15:29 AM     CULTURE, URINE    Collection Time: 09/18/17  4:20 PM   Result Value Ref Range    Special Requests: NO SPECIAL REQUESTS      Culture result: NO GROWTH AFTER 19 HOURS     URINALYSIS W/ RFLX MICROSCOPIC    Collection Time: 09/18/17  4:20 PM   Result Value Ref Range    Color YELLOW      Appearance CLEAR      Specific gravity 1.018 1.005 - 1.030      pH (UA) 5.5 5.0 - 8.0      Protein NEGATIVE  NEG mg/dL    Glucose NEGATIVE  NEG mg/dL    Ketone NEGATIVE  NEG mg/dL    Bilirubin NEGATIVE  NEG      Blood NEGATIVE  NEG      Urobilinogen 0.2 0.2 - 1.0 EU/dL    Nitrites NEGATIVE  NEG      Leukocyte Esterase NEGATIVE  NEG     GLUCOSE, POC    Collection Time: 09/18/17  4:54 PM   Result Value Ref Range    Glucose (POC) 122 (H) 70 - 110 mg/dL   GLUCOSE, POC    Collection Time: 09/18/17 11:09 PM   Result Value Ref Range    Glucose (POC) 120 (H) 70 - 654 mg/dL   METABOLIC PANEL, BASIC Collection Time: 09/19/17  4:28 AM   Result Value Ref Range    Sodium 140 136 - 145 mmol/L    Potassium 4.3 3.5 - 5.5 mmol/L    Chloride 108 100 - 108 mmol/L    CO2 25 21 - 32 mmol/L    Anion gap 7 3.0 - 18 mmol/L    Glucose 125 (H) 74 - 99 mg/dL    BUN 22 (H) 7.0 - 18 MG/DL    Creatinine 1.15 0.6 - 1.3 MG/DL    BUN/Creatinine ratio 19 12 - 20      GFR est AA 58 (L) >60 ml/min/1.73m2    GFR est non-AA 48 (L) >60 ml/min/1.73m2    Calcium 8.1 (L) 8.5 - 10.1 MG/DL   CBC WITH AUTOMATED DIFF    Collection Time: 09/19/17  4:28 AM   Result Value Ref Range    WBC 7.5 4.6 - 13.2 K/uL    RBC 3.54 (L) 4.20 - 5.30 M/uL    HGB 10.1 (L) 12.0 - 16.0 g/dL    HCT 32.7 (L) 35.0 - 45.0 %    MCV 92.4 74.0 - 97.0 FL    MCH 28.5 24.0 - 34.0 PG    MCHC 30.9 (L) 31.0 - 37.0 g/dL    RDW 15.1 (H) 11.6 - 14.5 %    PLATELET 979 431 - 459 K/uL    MPV 10.0 9.2 - 11.8 FL    NEUTROPHILS 67 40 - 73 %    LYMPHOCYTES 19 (L) 21 - 52 %    MONOCYTES 13 (H) 3 - 10 %    EOSINOPHILS 1 0 - 5 %    BASOPHILS 0 0 - 2 %    ABS. NEUTROPHILS 5.1 1.8 - 8.0 K/UL    ABS. LYMPHOCYTES 1.4 0.9 - 3.6 K/UL    ABS. MONOCYTES 0.9 0.05 - 1.2 K/UL    ABS. EOSINOPHILS 0.1 0.0 - 0.4 K/UL    ABS.  BASOPHILS 0.0 0.0 - 0.1 K/UL    DF AUTOMATED     PROTHROMBIN TIME + INR    Collection Time: 09/19/17  4:28 AM   Result Value Ref Range    Prothrombin time 14.1 11.5 - 15.2 sec    INR 1.1 0.8 - 1.2     MAGNESIUM    Collection Time: 09/19/17  4:28 AM   Result Value Ref Range    Magnesium 2.2 1.6 - 2.6 mg/dL   PHOSPHORUS    Collection Time: 09/19/17  4:28 AM   Result Value Ref Range    Phosphorus 3.9 2.5 - 4.9 MG/DL   GLUCOSE, POC    Collection Time: 09/19/17  6:10 AM   Result Value Ref Range    Glucose (POC) 105 70 - 110 mg/dL   GLUCOSE, POC    Collection Time: 09/19/17 12:09 PM   Result Value Ref Range    Glucose (POC) 90 70 - 110 mg/dL             Imaging:   Knee: 9/18/17  FINDINGS:     The study demonstrates findings of total prosthetic replacement of right knee  joint with anatomic positions and anatomic alignments of patellar, femoral and  tibial prostheses. There is surgical drainage tube at the upper anterolateral  aspect of the joint noted.           IMPRESSION  IMPRESSION:     Status post total arthroplasty of right knee with anatomic positions and  anatomic alignments of prostheses.          Lillian Castle DO     Clinical Care Time: 30 min

## 2017-09-20 ENCOUNTER — HOME HEALTH ADMISSION (OUTPATIENT)
Dept: HOME HEALTH SERVICES | Facility: HOME HEALTH | Age: 67
End: 2017-09-20
Payer: MEDICARE

## 2017-09-20 VITALS
BODY MASS INDEX: 34.59 KG/M2 | TEMPERATURE: 97.1 F | SYSTOLIC BLOOD PRESSURE: 127 MMHG | WEIGHT: 220.38 LBS | OXYGEN SATURATION: 94 % | RESPIRATION RATE: 16 BRPM | DIASTOLIC BLOOD PRESSURE: 66 MMHG | HEART RATE: 61 BPM | HEIGHT: 67 IN

## 2017-09-20 LAB
ANION GAP SERPL CALC-SCNC: 6 MMOL/L (ref 3–18)
APPEARANCE UR: ABNORMAL
BACTERIA SPEC CULT: NORMAL
BACTERIA URNS QL MICRO: ABNORMAL /HPF
BASOPHILS # BLD: 0 K/UL (ref 0–0.1)
BASOPHILS NFR BLD: 0 % (ref 0–2)
BILIRUB UR QL: NEGATIVE
BUN SERPL-MCNC: 19 MG/DL (ref 7–18)
BUN/CREAT SERPL: 18 (ref 12–20)
CALCIUM SERPL-MCNC: 8.1 MG/DL (ref 8.5–10.1)
CHLORIDE SERPL-SCNC: 108 MMOL/L (ref 100–108)
CO2 SERPL-SCNC: 25 MMOL/L (ref 21–32)
COLOR UR: YELLOW
CREAT SERPL-MCNC: 1.04 MG/DL (ref 0.6–1.3)
DIFFERENTIAL METHOD BLD: ABNORMAL
EOSINOPHIL # BLD: 0.2 K/UL (ref 0–0.4)
EOSINOPHIL NFR BLD: 3 % (ref 0–5)
EPITH CASTS URNS QL MICRO: ABNORMAL /LPF (ref 0–5)
ERYTHROCYTE [DISTWIDTH] IN BLOOD BY AUTOMATED COUNT: 15.5 % (ref 11.6–14.5)
GLUCOSE BLD STRIP.AUTO-MCNC: 120 MG/DL (ref 70–110)
GLUCOSE BLD STRIP.AUTO-MCNC: 125 MG/DL (ref 70–110)
GLUCOSE BLD STRIP.AUTO-MCNC: 144 MG/DL (ref 70–110)
GLUCOSE SERPL-MCNC: 105 MG/DL (ref 74–99)
GLUCOSE UR STRIP.AUTO-MCNC: NEGATIVE MG/DL
HCT VFR BLD AUTO: 32.9 % (ref 35–45)
HGB BLD-MCNC: 10.1 G/DL (ref 12–16)
HGB UR QL STRIP: ABNORMAL
INR PPP: 1.2 (ref 0.8–1.2)
KETONES UR QL STRIP.AUTO: NEGATIVE MG/DL
LEUKOCYTE ESTERASE UR QL STRIP.AUTO: ABNORMAL
LYMPHOCYTES # BLD: 1.3 K/UL (ref 0.9–3.6)
LYMPHOCYTES NFR BLD: 15 % (ref 21–52)
MAGNESIUM SERPL-MCNC: 2.5 MG/DL (ref 1.6–2.6)
MCH RBC QN AUTO: 28.5 PG (ref 24–34)
MCHC RBC AUTO-ENTMCNC: 30.7 G/DL (ref 31–37)
MCV RBC AUTO: 92.7 FL (ref 74–97)
MONOCYTES # BLD: 1.5 K/UL (ref 0.05–1.2)
MONOCYTES NFR BLD: 16 % (ref 3–10)
NEUTS SEG # BLD: 5.9 K/UL (ref 1.8–8)
NEUTS SEG NFR BLD: 66 % (ref 40–73)
NITRITE UR QL STRIP.AUTO: NEGATIVE
PH UR STRIP: 5 [PH] (ref 5–8)
PHOSPHATE SERPL-MCNC: 3.6 MG/DL (ref 2.5–4.9)
PLATELET # BLD AUTO: 192 K/UL (ref 135–420)
PMV BLD AUTO: 9.9 FL (ref 9.2–11.8)
POTASSIUM SERPL-SCNC: 5.1 MMOL/L (ref 3.5–5.5)
PROT UR STRIP-MCNC: ABNORMAL MG/DL
PROTHROMBIN TIME: 14.2 SEC (ref 11.5–15.2)
RBC # BLD AUTO: 3.55 M/UL (ref 4.2–5.3)
RBC #/AREA URNS HPF: ABNORMAL /HPF (ref 0–5)
SERVICE CMNT-IMP: NORMAL
SODIUM SERPL-SCNC: 139 MMOL/L (ref 136–145)
SP GR UR REFRACTOMETRY: 1.02 (ref 1–1.03)
UROBILINOGEN UR QL STRIP.AUTO: 0.2 EU/DL (ref 0.2–1)
WBC # BLD AUTO: 8.9 K/UL (ref 4.6–13.2)
WBC URNS QL MICRO: ABNORMAL /HPF (ref 0–4)

## 2017-09-20 PROCEDURE — 82962 GLUCOSE BLOOD TEST: CPT

## 2017-09-20 PROCEDURE — 74011250637 HC RX REV CODE- 250/637: Performed by: PHYSICIAN ASSISTANT

## 2017-09-20 PROCEDURE — 97110 THERAPEUTIC EXERCISES: CPT

## 2017-09-20 PROCEDURE — 36415 COLL VENOUS BLD VENIPUNCTURE: CPT | Performed by: ORTHOPAEDIC SURGERY

## 2017-09-20 PROCEDURE — 85025 COMPLETE CBC W/AUTO DIFF WBC: CPT | Performed by: ORTHOPAEDIC SURGERY

## 2017-09-20 PROCEDURE — 81001 URINALYSIS AUTO W/SCOPE: CPT | Performed by: PHYSICIAN ASSISTANT

## 2017-09-20 PROCEDURE — 77010033678 HC OXYGEN DAILY

## 2017-09-20 PROCEDURE — 77030005538 HC CATH URETH FOL44 BARD -B

## 2017-09-20 PROCEDURE — 84100 ASSAY OF PHOSPHORUS: CPT | Performed by: ORTHOPAEDIC SURGERY

## 2017-09-20 PROCEDURE — 85610 PROTHROMBIN TIME: CPT | Performed by: ORTHOPAEDIC SURGERY

## 2017-09-20 PROCEDURE — 97116 GAIT TRAINING THERAPY: CPT

## 2017-09-20 PROCEDURE — 74011250637 HC RX REV CODE- 250/637: Performed by: ORTHOPAEDIC SURGERY

## 2017-09-20 PROCEDURE — 87086 URINE CULTURE/COLONY COUNT: CPT | Performed by: PHYSICIAN ASSISTANT

## 2017-09-20 PROCEDURE — 83735 ASSAY OF MAGNESIUM: CPT | Performed by: ORTHOPAEDIC SURGERY

## 2017-09-20 PROCEDURE — 80048 BASIC METABOLIC PNL TOTAL CA: CPT | Performed by: ORTHOPAEDIC SURGERY

## 2017-09-20 RX ORDER — TAMSULOSIN HYDROCHLORIDE 0.4 MG/1
0.4 CAPSULE ORAL DAILY
Qty: 30 CAP | Refills: 3 | Status: SHIPPED | OUTPATIENT
Start: 2017-09-20 | End: 2018-01-23

## 2017-09-20 RX ORDER — WARFARIN 4 MG/1
8 TABLET ORAL
Status: COMPLETED | OUTPATIENT
Start: 2017-09-20 | End: 2017-09-20

## 2017-09-20 RX ORDER — TAMSULOSIN HYDROCHLORIDE 0.4 MG/1
0.4 CAPSULE ORAL DAILY
Status: DISCONTINUED | OUTPATIENT
Start: 2017-09-21 | End: 2017-09-20

## 2017-09-20 RX ADMIN — ESCITALOPRAM OXALATE 10 MG: 10 TABLET ORAL at 16:56

## 2017-09-20 RX ADMIN — OXYCODONE HYDROCHLORIDE AND ACETAMINOPHEN 1 TABLET: 7.5; 325 TABLET ORAL at 11:02

## 2017-09-20 RX ADMIN — Medication 325 MG: at 09:50

## 2017-09-20 RX ADMIN — PANTOPRAZOLE SODIUM 40 MG: 40 TABLET, DELAYED RELEASE ORAL at 17:33

## 2017-09-20 RX ADMIN — PREGABALIN 50 MG: 50 CAPSULE ORAL at 09:51

## 2017-09-20 RX ADMIN — Medication 10 ML: at 06:00

## 2017-09-20 RX ADMIN — DOCUSATE SODIUM 100 MG: 100 CAPSULE, LIQUID FILLED ORAL at 17:33

## 2017-09-20 RX ADMIN — DOCUSATE SODIUM 100 MG: 100 CAPSULE, LIQUID FILLED ORAL at 09:51

## 2017-09-20 RX ADMIN — WARFARIN SODIUM 8 MG: 4 TABLET ORAL at 09:50

## 2017-09-20 RX ADMIN — TAMSULOSIN HYDROCHLORIDE 0.4 MG: 0.4 CAPSULE ORAL at 09:50

## 2017-09-20 RX ADMIN — PREGABALIN 50 MG: 50 CAPSULE ORAL at 17:33

## 2017-09-20 RX ADMIN — METOPROLOL SUCCINATE 100 MG: 100 TABLET, EXTENDED RELEASE ORAL at 09:51

## 2017-09-20 RX ADMIN — Medication 10 ML: at 15:30

## 2017-09-20 RX ADMIN — CELECOXIB 200 MG: 100 CAPSULE ORAL at 09:50

## 2017-09-20 RX ADMIN — OXYCODONE HYDROCHLORIDE AND ACETAMINOPHEN 2 TABLET: 7.5; 325 TABLET ORAL at 06:05

## 2017-09-20 RX ADMIN — Medication 325 MG: at 16:56

## 2017-09-20 RX ADMIN — LEVOTHYROXINE SODIUM 88 MCG: 88 TABLET ORAL at 06:42

## 2017-09-20 RX ADMIN — LOSARTAN POTASSIUM 100 MG: 50 TABLET ORAL at 09:50

## 2017-09-20 RX ADMIN — CELECOXIB 200 MG: 100 CAPSULE ORAL at 17:33

## 2017-09-20 RX ADMIN — PANTOPRAZOLE SODIUM 40 MG: 40 TABLET, DELAYED RELEASE ORAL at 09:51

## 2017-09-20 NOTE — PROGRESS NOTES
Care Management Interventions  PCP Verified by CM: Yes  Mode of Transport at Discharge:  (family)  Transition of Care Consult (CM Consult): [address]: Yes  Physical Therapy Consult: Yes  Occupational Therapy Consult: Yes  Current Support Network: Lives with Spouse  Confirm Follow Up Transport: Family  Plan discussed with Pt/Family/Caregiver: Yes  Freedom of Choice Offered: Yes (home health)  Discharge Location  Discharge Placement: Home with home health    Pt is a 61year old admitted for osteoarthritis of right knee. Pt is alert and oriented and alone in room. Pt reports that she lives with her  Janet Rodríguez [phone]. Pt reports that prior to admission she was independent in her ADLs and that she used a cane to ambulate. Pt reports that she has a rollator, shower chair, several canes, and an elevated toilet seat at home. Pt reports that she plans to return home on discharge and that she has transportation home. FOC signed for [facility]. Referral sent. Spoke to Dano Newby- admission liaison for [facility]. Pt needs front wheeled walker. Agnie Martin with [facility].

## 2017-09-20 NOTE — PROGRESS NOTES
Pt alert and oriented. Vitals within normal limits. Lungs clear. Pt denies any numbness or tingling to RLE. Pedal pulses present. drsg to knee is dry,clean and intact. Call bell within reach.

## 2017-09-20 NOTE — ROUTINE PROCESS
0036:Stable. Pain management continues. No nausea or vomiting. Blood glucose well managed, otherwise no issue. Pt continues to be bradycardia  most of the time. Will continue with the  current care plan.   0600: Caal out. Pt tolerated well.

## 2017-09-20 NOTE — PROGRESS NOTES
[facility]  Pulmonary, Critical Care, and Sleep Medicine      Name: Ange Richmond MRN: [id number]   : 1950 Hospital: [address] [place] [address] Dr   Date: 2017          Internal Medicine Progress Note    Requesting MD: Dr. Sebas Gama:   · Osteoarthritis of right knee, s/p right Total Knee Replacement (Day 2)  · Bradycardia-asymptomatic, possibly secondary to anesthesia and metoprolol prior to surgery -improved, only intermittent  · Hx hypothyroidism s/p thyroidectomy; DM  · Hx HTN  · Hx GERD  · H/O Urinary retention      RECOMMENDATIONS:   Stable on room air  Encourage incentive spirometry; mobilize - PT/OT  Received elaine-operative antibiotics - clindamycin (with end date 17). Monitor for fevers, leukocytosis   Hemodynamically stable. Intermittent bradycardia -asymptomatic, likely from BB. Continue lopressor with hold parameters  Continue levothyroxine in am  Stool softener given narcotic use  Diet ordered  Pain management and anticoagulation per orthopedic service  Wound care  Urology to consult for urinary retention  Labs: CBC, BMP, Mg, Phos in am  GI proph: protonix for symptomatic GERD     Subjective/History:   60 y/o female with hx of DM, GERD, HTN, hypothyroidism, osteoarthritis, s/p total knee replacement of right knee for failed conservative management of osteoarthritis. 17  No new events overnight. Afebrile. Hemodynamically stable. Had some urinary retention this pm, and with insertion of parker had 200 mL urine output. No BM but has flatus.       Past Medical History:   Diagnosis Date    Breast cyst     benign, removed    Deviated septum 2010    septoplasty    Diabetes mellitus     Type 2    GERD (gastroesophageal reflux disease)     severe    Hypertension     Hypothyroidism     Other elective surgery 2008    toe surgery    Pure hypercholesterolemia     Stress thallium 2009 No evidence of ischemia or infarction; EF 78%; EKG portion negative with exercise capacity below average for age at 6 min of exercise    Varicose veins     closure in right leg 2008 & left leg 2007    Venous reflux study 06/24/2014    No DVT. [facility] occlusions of bilateral GSV. Past Surgical History:   Procedure Laterality Date    CYSTOTOMY,EXCIS BLADDER TUMOR  2006    HX HYSTERECTOMY      HX PARTIAL HYSTERECTOMY  1994    AK EGD DELIVER THERMAL ENERGY SPHNCTR/CARDIA GERD      THYROIDECTOMY  1992      Prior to Admission medications    Medication Sig Start Date End Date Taking? Authorizing Provider   celecoxib (CELEBREX) 200 mg capsule Take 1 Cap by mouth two (2) times a day for 90 days. 9/19/17 12/18/17 Yes Coty Patino PA-C   ferrous sulfate 325 mg (65 mg iron) tablet Take 1 Tab by mouth two (2) times daily (with meals). 9/19/17  Yes Coty Patino PA-C   oxyCODONE-acetaminophen (PERCOCET 7.5) 7.5-325 mg per tablet Take 1-2 Tabs by mouth every four (4) hours as needed. Max Daily Amount: 12 Tabs. 9/19/17  Yes Coty Patino PA-C   aspirin (ASPIRIN) 325 mg tablet Take 1 Tab by mouth daily. 9/19/17  Yes Coty Patino PA-C   levoFLOXacin (LEVAQUIN) 500 mg tablet 1 po q day x 5 days 9/13/17  Yes Coty Patino PA-C   cetirizine (ZYRTEC) 10 mg tablet Take 10 mg by mouth daily. Yes Historical Provider   metoprolol succinate (TOPROL-XL) 100 mg tablet TAKE ONE TABLET BY MOUTH DAILY 4/1/17  Yes Addie Amezcua,    losartan (COZAAR) 100 mg tablet Take 100 mg by mouth daily. Yes Historical Provider   escitalopram oxalate (LEXAPRO) 10 mg tablet Take 10 mg by mouth daily. Yes Historical Provider   pantoprazole (PROTONIX) 40 mg tablet Take 40 mg by mouth two (2) times a day. 6/8/14  Yes Historical Provider   metformin (GLUCOPHAGE) 500 mg tablet Take 500 mg by mouth daily (with breakfast). Yes Historical Provider   levothyroxine (LEVOXYL) 88 mcg tablet Take 88 mcg by mouth daily.    Yes Historical Provider   lorazepam (ATIVAN) 1 mg tablet Take 0.5 mg by mouth two (2) times a day. Yes Historical Provider   ERGOCALCIFEROL, VITAMIN D2, (VITAMIN D PO) Take 50,000 Units by mouth every Monday. Yes Historical Provider     Current Facility-Administered Medications   Medication Dose Route Frequency    escitalopram oxalate (LEXAPRO) tablet 10 mg  10 mg Oral Q24H    tamsulosin (FLOMAX) capsule 0.4 mg  0.4 mg Oral DAILY    losartan (COZAAR) tablet 100 mg  100 mg Oral DAILY    metoprolol succinate (TOPROL-XL) tablet 100 mg  100 mg Oral DAILY    pantoprazole (PROTONIX) tablet 40 mg  40 mg Oral BID    sodium chloride (NS) flush 5-10 mL  5-10 mL IntraVENous Q8H    ferrous sulfate tablet 325 mg  1 Tab Oral BID WITH MEALS    celecoxib (CELEBREX) capsule 200 mg  200 mg Oral BID    docusate sodium (COLACE) capsule 100 mg  100 mg Oral BID    pregabalin (LYRICA) capsule 50 mg  50 mg Oral BID    oxyCODONE ER (OxyCONTIN) tablet 20 mg  20 mg Oral Q12H    WARFARIN INFORMATION NOTE (COUMADIN)   Other Q24H    insulin lispro (HUMALOG) injection   SubCUTAneous AC&HS    levothyroxine (SYNTHROID) tablet 88 mcg  88 mcg Oral ACB     Allergies   Allergen Reactions    Pcn [Penicillins] Unknown (comments)      Social History   Substance Use Topics    Smoking status: Never Smoker    Smokeless tobacco: Never Used    Alcohol use No      Family History   Problem Relation Age of Onset    Heart Attack Maternal Grandmother     Hypertension Mother         Review of Systems:  A comprehensive review of systems was negative except for that written in the HPI.     Objective:   Vital Signs:    Visit Vitals    /66 (BP 1 Location: Left arm, BP Patient Position: At rest)    Pulse 61    Temp 97.1 °F (36.2 °C)    Resp 16    Ht 5' 7\" (1.702 m)    Wt 100 kg (220 lb 6 oz)    SpO2 94%    BMI 34.52 kg/m2       O2 Device: Nasal cannula   O2 Flow Rate (L/min): 2 l/min   Temp (24hrs), Av.3 °F (36.3 °C), Min:96.9 °F (36.1 °C), Max:97.8 °F (36.6 °C)       Intake/Output:   Last shift:      09/20 0701 - 09/20 1900  In: 240 [P.O.:240]  Out: 300 [Urine:300]  Last 3 shifts: 09/18 1901 - 09/20 0700  In: 1966.7 [P.O.:100; I.V.:1866.7]  Out: 2320 [Urine:2100; Drains:220]    Intake/Output Summary (Last 24 hours) at 09/20/17 1741  Last data filed at 09/20/17 1618   Gross per 24 hour   Intake              240 ml   Output             1500 ml   Net            -1260 ml         Physical Exam:    General:  Alert, cooperative, no distress, appears stated age. Head:  Normocephalic, without obvious abnormality, atraumatic. Eyes:  Conjunctivae/corneas clear. PERRL, EOMs intact. Nose: Nares normal. No drainage    Throat: Lips, mucosa, and tongue normal. Teeth and gums normal.   Neck: Supple, symmetrical, trachea midline, no adenopathy, thyroid: no enlargment/tenderness/nodules, no carotid bruit and no JVD. Back:   Symmetric, no curvature. ROM normal.   Lungs:   Clear to auscultation bilaterally. Chest wall:  No tenderness or deformity. Heart:  Regular rate and rhythm, S1, S2 normal   Abdomen:   Soft, non-tender. Bowel sounds normal.    Extremities: R knee with surgical dressing - no gross bleeding   Pulses: 2+ and symmetric all extremities.    Skin: Skin color, texture, turgor normal. No rashes or lesions   Lymph nodes: Cervical, supraclavicular nodes normal.   Neurologic: Grossly nonfocal       Data:     Recent Results (from the past 24 hour(s))   GLUCOSE, POC    Collection Time: 09/19/17  9:09 PM   Result Value Ref Range    Glucose (POC) 126 (H) 70 - 433 mg/dL   METABOLIC PANEL, BASIC    Collection Time: 09/20/17  3:33 AM   Result Value Ref Range    Sodium 139 136 - 145 mmol/L    Potassium 5.1 3.5 - 5.5 mmol/L    Chloride 108 100 - 108 mmol/L    CO2 25 21 - 32 mmol/L    Anion gap 6 3.0 - 18 mmol/L    Glucose 105 (H) 74 - 99 mg/dL    BUN 19 (H) 7.0 - 18 MG/DL    Creatinine 1.04 0.6 - 1.3 MG/DL    BUN/Creatinine ratio 18 12 - 20      GFR est AA >60 >60 ml/min/1.73m2    GFR est non-AA 53 (L) >60 ml/min/1.73m2    Calcium 8.1 (L) 8.5 - 10.1 MG/DL   PROTHROMBIN TIME + INR    Collection Time: 09/20/17  3:33 AM   Result Value Ref Range    Prothrombin time 14.2 11.5 - 15.2 sec    INR 1.2 0.8 - 1.2     MAGNESIUM    Collection Time: 09/20/17  3:33 AM   Result Value Ref Range    Magnesium 2.5 1.6 - 2.6 mg/dL   PHOSPHORUS    Collection Time: 09/20/17  3:33 AM   Result Value Ref Range    Phosphorus 3.6 2.5 - 4.9 MG/DL   GLUCOSE, POC    Collection Time: 09/20/17  5:49 AM   Result Value Ref Range    Glucose (POC) 125 (H) 70 - 110 mg/dL   CBC WITH AUTOMATED DIFF    Collection Time: 09/20/17  6:08 AM   Result Value Ref Range    WBC 8.9 4.6 - 13.2 K/uL    RBC 3.55 (L) 4.20 - 5.30 M/uL    HGB 10.1 (L) 12.0 - 16.0 g/dL    HCT 32.9 (L) 35.0 - 45.0 %    MCV 92.7 74.0 - 97.0 FL    MCH 28.5 24.0 - 34.0 PG    MCHC 30.7 (L) 31.0 - 37.0 g/dL    RDW 15.5 (H) 11.6 - 14.5 %    PLATELET 511 931 - 989 K/uL    MPV 9.9 9.2 - 11.8 FL    NEUTROPHILS 66 40 - 73 %    LYMPHOCYTES 15 (L) 21 - 52 %    MONOCYTES 16 (H) 3 - 10 %    EOSINOPHILS 3 0 - 5 %    BASOPHILS 0 0 - 2 %    ABS. NEUTROPHILS 5.9 1.8 - 8.0 K/UL    ABS. LYMPHOCYTES 1.3 0.9 - 3.6 K/UL    ABS. MONOCYTES 1.5 (H) 0.05 - 1.2 K/UL    ABS. EOSINOPHILS 0.2 0.0 - 0.4 K/UL    ABS.  BASOPHILS 0.0 0.0 - 0.1 K/UL    DF AUTOMATED     GLUCOSE, POC    Collection Time: 09/20/17  1:01 PM   Result Value Ref Range    Glucose (POC) 120 (H) 70 - 110 mg/dL   URINALYSIS W/ RFLX MICROSCOPIC    Collection Time: 09/20/17  2:11 PM   Result Value Ref Range    Color YELLOW      Appearance CLOUDY      Specific gravity 1.023 1.005 - 1.030      pH (UA) 5.0 5.0 - 8.0      Protein TRACE (A) NEG mg/dL    Glucose NEGATIVE  NEG mg/dL    Ketone NEGATIVE  NEG mg/dL    Bilirubin NEGATIVE  NEG      Blood MODERATE (A) NEG      Urobilinogen 0.2 0.2 - 1.0 EU/dL    Nitrites NEGATIVE  NEG      Leukocyte Esterase SMALL (A) NEG     URINE MICROSCOPIC ONLY    Collection Time: 09/20/17  2:11 PM   Result Value Ref Range    WBC 0 to 3 0 - 4 /hpf    RBC 6 to 12 0 - 5 /hpf    Epithelial cells 1+ 0 - 5 /lpf    Bacteria 3+ (A) NEG /hpf   GLUCOSE, POC    Collection Time: 09/20/17  5:02 PM   Result Value Ref Range    Glucose (POC) 144 (H) 70 - 110 mg/dL             Imaging:  No new imaging  Knee: 9/18/17  FINDINGS:     The study demonstrates findings of total prosthetic replacement of right knee  joint with anatomic positions and anatomic alignments of patellar, femoral and  tibial prostheses. There is surgical drainage tube at the upper anterolateral  aspect of the joint noted.       IMPRESSION:     Status post total arthroplasty of right knee with anatomic positions and  anatomic alignments of prostheses.          Angely Navarrete PA-C

## 2017-09-20 NOTE — PROGRESS NOTES
Problem: Mobility Impaired (Adult and Pediatric)  Goal: *Acute Goals and Plan of Care (Insert Text)  Physical Therapy Goals  Initiated 9/18/2017 and to be accomplished within 7 day(s)  1. Patient will move from supine to sit and sit to supine , scoot up and down and roll side to side in bed with modified independence. 2. Patient will transfer from bed to chair and chair to bed with modified independence using the least restrictive device. 3. Patient will perform sit to stand with modified independence. 4. Patient will ambulate with modified independence for >200 feet with the least restrictive device. 5. Patient will ascend/descend 8 stairs with 1 handrail(s) with supervision/set-up. 6. Patient will perform HEP independently and increased ROM and strength for carryover into functional tasks. Outcome: Progressing Towards Goal  PHYSICAL THERAPY TREATMENT     Patient: Jozef Cid (28 y.o. female)  Date: 9/20/2017  Diagnosis: Osteoarthritis of right knee, unspecified osteoarthritis type [M17.11] <principal problem not specified>  Procedure(s) (LRB):  RIGHT TOTAL KNEE ARTHROPLASTY (Right) 2 Days Post-Op  Precautions: Fall, WBAT   Chart, physical therapy assessment, plan of care and goals were reviewed. ASSESSMENT:  Pt found supine in bed willing to work with PT. Reviewed supine therex with pt prior to D/C. Pt recalls most therex, but requires VCs to slow down and educated in hold time for quad sets. Pt left supine with needs in reach. Pt has had RW delivered to room. Education: therex. Progression toward goals:  [X]      Improving appropriately and progressing toward goals  [ ]      Improving slowly and progressing toward goals  [ ]      Not making progress toward goals and plan of care will be adjusted       PLAN:  Patient continues to benefit from skilled intervention to address the above impairments. Continue treatment per established plan of care.   Discharge Recommendations:  Home Health  Further Equipment Recommendations for Discharge:  rolling walker       SUBJECTIVE:   Patient stated I've been doing these.  pt performing quad sets with 0-1 second hold time. OBJECTIVE DATA SUMMARY:   Critical Behavior:  Neurologic State: Alert  Orientation Level: Oriented X4  Cognition: Follows commands  Safety/Judgement: Fall prevention  Functional Mobility Training:  Bed Mobility:  Rolling: Supervision  Supine to Sit: Supervision  Sit to Supine: Supervision  Transfers:  Sit to Stand: Stand-by asssistance  Stand to Sit: Stand-by asssistance  Balance:  Sitting: Intact  Standing: Impaired; With support  Standing - Static: Good  Standing - Dynamic : Fair  Ambulation/Gait Training:  Distance (ft): 250 Feet (ft)  Assistive Device: Walker, rolling;Gait belt  Ambulation - Level of Assistance: Stand-by asssistance  Gait Abnormalities: Antalgic;Decreased step clearance  Right Side Weight Bearing: As tolerated  Stance: Left increased  Interventions: Verbal cues  Therapeutic Exercises:   Heel slides, ankle pumps, quad sets x 10 bilaterally. Pt unable to perform SLR at this time. Pain:  Pre tx pain: not rated  Post tx pain: not rated  Activity Tolerance:   Fair  Please refer to the flowsheet for vital signs taken during this treatment. After treatment:   [ ] Patient left in no apparent distress sitting up in chair  [X] Patient left in no apparent distress in bed  [X] Call bell left within reach  [ ] Nursing notified  [X] Caregiver present  [ ] Bed alarm activated      Maisha Theodore PTA   Time Calculation: 8 mins     Mobility  Current  CI= 1-19%. The severity rating is based on the Level of Assistance required for Functional Mobility and ADLs. Mobility   Goal  CI= 1-19%. The severity rating is based on the Level of Assistance required for Functional Mobility and ADLs.

## 2017-09-20 NOTE — HOME CARE
Received HH referral, Discharge noted for today , [facility] will follow for SN,PT,[place] Knee protocol , DME: RW ordered from DME rep Rozina Garner at [facility]''  ,pt states she already has a shower chair,cane,toilet riser and elevated toilets ,[facility] will follow. TUNDE BANDA.    2:22ZS- Received HH orders for Caal care ,[facility], pt's nurse Bandar Murillo) states that pt was  placed with  #16 Fr Caal catheter before discharged, [facility] will follow. TUNDE BANDA.

## 2017-09-20 NOTE — PROGRESS NOTES
Problem: Mobility Impaired (Adult and Pediatric)  Goal: *Acute Goals and Plan of Care (Insert Text)  Physical Therapy Goals  Initiated 9/18/2017 and to be accomplished within 7 day(s)  1. Patient will move from supine to sit and sit to supine , scoot up and down and roll side to side in bed with modified independence. 2. Patient will transfer from bed to chair and chair to bed with modified independence using the least restrictive device. 3. Patient will perform sit to stand with modified independence. 4. Patient will ambulate with modified independence for >200 feet with the least restrictive device. 5. Patient will ascend/descend 8 stairs with 1 handrail(s) with supervision/set-up. 6. Patient will perform HEP independently and increased ROM and strength for carryover into functional tasks. Outcome: Progressing Towards Goal  PHYSICAL THERAPY TREATMENT     Patient: Blanka Curtis (13 y.o. female)  Date: 9/20/2017  Diagnosis: Osteoarthritis of right knee, unspecified osteoarthritis type [M17.11] <principal problem not specified>  Procedure(s) (LRB):  RIGHT TOTAL KNEE ARTHROPLASTY (Right) 2 Days Post-Op  Precautions: Fall, WBAT   Chart, physical therapy assessment, plan of care and goals were reviewed. ASSESSMENT:  Pt found supine in bed willing to work with PT. Pt sat EOB, utilizing her UEs to control right LE with supine to sit. Pt ambulated into hallway this tx and is able to increase distance, as well as negotiate 4 stairs. Pt presents with very slight forward trunk bend which is easily corrected with VCs. Pt with step to gait, returning to supine post tx, pt able to complete without use of UEs. Reviewed safety at home and HEP at length. Pt progressing well this tx and present with determination toward optimal recovery.      Education:therex, gait   Progression toward goals:  [ ]      Improving appropriately and progressing toward goals  [X]      Improving slowly and progressing toward goals  [ ] Not making progress toward goals and plan of care will be adjusted       PLAN:  Patient continues to benefit from skilled intervention to address the above impairments. Continue treatment per established plan of care. Discharge Recommendations:  Home Health  Further Equipment Recommendations for Discharge:  rolling walker       SUBJECTIVE:   Patient stated I'm going to make sure I get good use of this knee.       OBJECTIVE DATA SUMMARY:   Critical Behavior:  Neurologic State: Alert  Orientation Level: Oriented X4  Cognition: Follows commands  Safety/Judgement: Fall prevention  Functional Mobility Training:  Bed Mobility:  Rolling: Supervision  Supine to Sit: Supervision  Sit to Supine: Supervision  Transfers:  Sit to Stand: Stand-by asssistance  Stand to Sit: Stand-by asssistance  Balance:  Sitting: Intact  Standing: Impaired; With support  Standing - Static: Good  Standing - Dynamic : Fair  Ambulation/Gait Training:  Distance (ft): 250 Feet (ft)  Assistive Device: Walker, rolling;Gait belt  Ambulation - Level of Assistance: Stand-by asssistance  Gait Abnormalities: Antalgic;Decreased step clearance  Right Side Weight Bearing: As tolerated  Stance: Left increased  Interventions: Verbal cues  Therapeutic Exercises:   Reviewed. Pain:  Pre tx pain: 0  Post tx pain: 4  Pain Scale 1: Numeric (0 - 10)  Pain Intensity 1: 4  Pain Location 1: Knee  Pain Orientation 1: Posterior  Pain Description 1: Aching  Activity Tolerance:   Fair  Please refer to the flowsheet for vital signs taken during this treatment. After treatment:   [ ] Patient left in no apparent distress sitting up in chair  [X] Patient left in no apparent distress in bed  [X] Call bell left within reach  [ ] Nursing notified  [ ] Caregiver present  [ ] Bed alarm activated      Demetrio Chowdhury PTA   Time Calculation: 24 mins     Mobility  Current  CI= 1-19%.   The severity rating is based on the Level of Assistance required for Functional Mobility and ADLs.     Mobility   Goal  CI= 1-19%. The severity rating is based on the Level of Assistance required for Functional Mobility and ADLs.

## 2017-09-20 NOTE — ROUTINE PROCESS
Martha hi/ [facility] in [facility], [phone], delivered Karle Meckel to patient prior to Discharge.

## 2017-09-20 NOTE — CONSULTS
No chief complaint on file. HISTORY OF PRESENT ILLNESS:  Bartolo Tinoco is a 61 y.o. female who I was asked to see tonight because of urinary retention following an uneventful total knee replacement from degenerative joint disease. Her surgery was on September 18 and since that time she has been unable to urinate. She is scheduled to go home this evening and on bladder scan she had almost 400 cc in her bladder so the nursing staff inserted a Caal catheter and asked that we follow up with this. In talking with her, she urinates very well at home and in fact has a great deal of urinary frequency. She has had a total hysterectomy in the past for benign diseases and has had 2 children, neither of which resulted in any prolonged labor. Her history from her bladder standpoint is significant in that a number of years ago she had some grossly bloody urine and was found by another urologist to have a malignancy of the urinary bladder. This was resected at the time and she has had no further problems. She has no systemic neurologic deficit and her only other metabolic illness pertaining to the bladder I think is her diabetes. Past Medical History:   Diagnosis Date    Breast cyst 1994    benign, removed    Deviated septum 04/2010    septoplasty    Diabetes mellitus     Type 2    GERD (gastroesophageal reflux disease)     severe    Hypertension     Hypothyroidism     Other elective surgery July 2008    toe surgery    Pure hypercholesterolemia     Stress thallium 09/30/2009    No evidence of ischemia or infarction; EF 78%; EKG portion negative with exercise capacity below average for age at 6 min of exercise    Varicose veins     closure in right leg 2008 & left leg 2007    Venous reflux study 06/24/2014    No DVT. [facility] occlusions of bilateral GSV.        Past Surgical History:   Procedure Laterality Date    CYSTOTOMY,EXCIS BLADDER TUMOR  2006    HX HYSTERECTOMY      HX PARTIAL HYSTERECTOMY 1994    RI EGD DELIVER THERMAL ENERGY SPHNCTR/CARDIA GERD      THYROIDECTOMY  1992       Social History   Substance Use Topics    Smoking status: Never Smoker    Smokeless tobacco: Never Used    Alcohol use No       Allergies   Allergen Reactions    Pcn [Penicillins] Unknown (comments)       Family History   Problem Relation Age of Onset    Heart Attack Maternal Grandmother     Hypertension Mother        Current Facility-Administered Medications   Medication Dose Route Frequency Provider Last Rate Last Dose    escitalopram oxalate (LEXAPRO) tablet 10 mg  10 mg Oral Q24H Elena Head MD   10 mg at 09/20/17 1656    LORazepam (ATIVAN) tablet 0.5 mg  0.5 mg Oral BID PRN January-Susanne JULIEN PA-C        tamsulosin (FLOMAX) capsule 0.4 mg  0.4 mg Oral DAILY Hugh Pardo PA-C   0.4 mg at 09/20/17 0950    losartan (COZAAR) tablet 100 mg  100 mg Oral DAILY Elena Head MD   100 mg at 09/20/17 0950    metoprolol succinate (TOPROL-XL) tablet 100 mg  100 mg Oral DAILY January-Jioneyda JULIEN PA-C   100 mg at 09/20/17 0951    pantoprazole (PROTONIX) tablet 40 mg  40 mg Oral BID Elena Head MD   40 mg at 09/20/17 1733    sodium chloride (NS) flush 5-10 mL  5-10 mL IntraVENous Q8H Elena Head MD   10 mL at 09/20/17 1530    sodium chloride (NS) flush 5-10 mL  5-10 mL IntraVENous PRN Elena Head MD        naloxone [facility]) injection 0.4 mg  0.4 mg IntraVENous PRN Elena Head MD        ferrous sulfate tablet 325 mg  1 Tab Oral BID WITH MEALS Elena Head MD   325 mg at 09/20/17 1656    diphenhydrAMINE (BENADRYL) injection 12.5 mg  12.5 mg IntraVENous Q6H PRN Elena Head MD        zolpidem (AMBIEN) tablet 5 mg  5 mg Oral QHS PRN Elena Head MD        celecoxib (CELEBREX) capsule 200 mg  200 mg Oral BID Elena Head MD   200 mg at 09/20/17 1733    oxyCODONE-acetaminophen (PERCOCET 7.5) 7.5-325 mg per tablet 1-2 Tab  1-2 Tab Oral Q4H PRN Elena Head MD   1 Tab at 09/20/17 1102    ondansetron (ZOFRAN) injection 4 mg  4 mg IntraVENous Q4H PRN Rajeev Laguna MD   4 mg at 09/18/17 2006    docusate sodium (COLACE) capsule 100 mg  100 mg Oral BID Rajeev Laguna MD   100 mg at 09/20/17 1733    pregabalin (LYRICA) capsule 50 mg  50 mg Oral BID Rajeev Laguna MD   50 mg at 09/20/17 1733    oxyCODONE ER (OxyCONTIN) tablet 20 mg  20 mg Oral Q12H Rajeev Laguna MD   20 mg at 09/19/17 2134    WARFARIN INFORMATION NOTE (COUMADIN)   Other Q24H Rajeev Laguna MD        insulin lispro (HUMALOG) injection   SubCUTAneous AC&HS Darryle Powers V, PA-C   Stopped at 09/18/17 1630    glucose chewable tablet 16 g  4 Tab Oral PRN January-Jioneyda JULIEN PA-C        glucagon (GLUCAGEN) injection 1 mg  1 mg IntraMUSCular PRN January-Jioneyda JULIEN PA-C        dextrose (D50W) injection syrg 12.5-25 g  25-50 mL IntraVENous PRN January-Jioneyda JULIEN PA-C        levothyroxine (SYNTHROID) tablet 88 mcg  88 mcg Oral ACB January-JiHOSSEIN WhelanC   88 mcg at 09/20/17 1955           REVIEW OF SYSTEMS:   Documented on the chart. Refer to that for complete details. PHYSICAL EXAMINATION:     Visit Vitals    /66 (BP 1 Location: Left arm, BP Patient Position: At rest)    Pulse 61    Temp 97.1 °F (36.2 °C)    Resp 16    Ht 5' 7\" (1.702 m)    Wt 220 lb 6 oz (100 kg)    SpO2 94%    BMI 34.52 kg/m2     Constitutional: Well developed, well-nourished female in no acute distress. CV:  No peripheral swelling noted  Respiratory: No respiratory distress or difficulties  Abdomen:  Soft and nontender. No masses. No hepatosplenomegaly.  Female:  No CVA tenderness. Urine draining into the Caal bag is completely clear. Skin:  Normal color. No evidence of jaundice. Neuro/Psych:  Patient with appropriate affect. Alert and oriented. Lymphatic:   No enlargement of supraclavicular lymph nodes.       Results for orders placed or performed during the hospital encounter of 09/18/17   CULTURE, URINE Result Value Ref Range    Special Requests: NO SPECIAL REQUESTS      Culture result: NO GROWTH 2 DAYS     HEMOGLOBIN A1C WITH EAG   Result Value Ref Range    Hemoglobin A1c 6.1 (H) 4.2 - 5.6 %    Est. average glucose 128 mg/dL   URINALYSIS W/ RFLX MICROSCOPIC   Result Value Ref Range    Color YELLOW      Appearance CLEAR      Specific gravity 1.018 1.005 - 1.030      pH (UA) 5.5 5.0 - 8.0      Protein NEGATIVE  NEG mg/dL    Glucose NEGATIVE  NEG mg/dL    Ketone NEGATIVE  NEG mg/dL    Bilirubin NEGATIVE  NEG      Blood NEGATIVE  NEG      Urobilinogen 0.2 0.2 - 1.0 EU/dL    Nitrites NEGATIVE  NEG      Leukocyte Esterase NEGATIVE  NEG     METABOLIC PANEL, BASIC   Result Value Ref Range    Sodium 140 136 - 145 mmol/L    Potassium 4.3 3.5 - 5.5 mmol/L    Chloride 108 100 - 108 mmol/L    CO2 25 21 - 32 mmol/L    Anion gap 7 3.0 - 18 mmol/L    Glucose 125 (H) 74 - 99 mg/dL    BUN 22 (H) 7.0 - 18 MG/DL    Creatinine 1.15 0.6 - 1.3 MG/DL    BUN/Creatinine ratio 19 12 - 20      GFR est AA 58 (L) >60 ml/min/1.73m2    GFR est non-AA 48 (L) >60 ml/min/1.73m2    Calcium 8.1 (L) 8.5 - 10.1 MG/DL   CBC WITH AUTOMATED DIFF   Result Value Ref Range    WBC 7.5 4.6 - 13.2 K/uL    RBC 3.54 (L) 4.20 - 5.30 M/uL    HGB 10.1 (L) 12.0 - 16.0 g/dL    HCT 32.7 (L) 35.0 - 45.0 %    MCV 92.4 74.0 - 97.0 FL    MCH 28.5 24.0 - 34.0 PG    MCHC 30.9 (L) 31.0 - 37.0 g/dL    RDW 15.1 (H) 11.6 - 14.5 %    PLATELET 174 329 - 587 K/uL    MPV 10.0 9.2 - 11.8 FL    NEUTROPHILS 67 40 - 73 %    LYMPHOCYTES 19 (L) 21 - 52 %    MONOCYTES 13 (H) 3 - 10 %    EOSINOPHILS 1 0 - 5 %    BASOPHILS 0 0 - 2 %    ABS. NEUTROPHILS 5.1 1.8 - 8.0 K/UL    ABS. LYMPHOCYTES 1.4 0.9 - 3.6 K/UL    ABS. MONOCYTES 0.9 0.05 - 1.2 K/UL    ABS. EOSINOPHILS 0.1 0.0 - 0.4 K/UL    ABS.  BASOPHILS 0.0 0.0 - 0.1 K/UL    DF AUTOMATED     PROTHROMBIN TIME + INR   Result Value Ref Range    Prothrombin time 14.1 11.5 - 15.2 sec    INR 1.1 0.8 - 1.2     MAGNESIUM   Result Value Ref Range Magnesium 2.2 1.6 - 2.6 mg/dL   PHOSPHORUS   Result Value Ref Range    Phosphorus 3.9 2.5 - 4.9 MG/DL   METABOLIC PANEL, BASIC   Result Value Ref Range    Sodium 139 136 - 145 mmol/L    Potassium 5.1 3.5 - 5.5 mmol/L    Chloride 108 100 - 108 mmol/L    CO2 25 21 - 32 mmol/L    Anion gap 6 3.0 - 18 mmol/L    Glucose 105 (H) 74 - 99 mg/dL    BUN 19 (H) 7.0 - 18 MG/DL    Creatinine 1.04 0.6 - 1.3 MG/DL    BUN/Creatinine ratio 18 12 - 20      GFR est AA >60 >60 ml/min/1.73m2    GFR est non-AA 53 (L) >60 ml/min/1.73m2    Calcium 8.1 (L) 8.5 - 10.1 MG/DL   PROTHROMBIN TIME + INR   Result Value Ref Range    Prothrombin time 14.2 11.5 - 15.2 sec    INR 1.2 0.8 - 1.2     MAGNESIUM   Result Value Ref Range    Magnesium 2.5 1.6 - 2.6 mg/dL   PHOSPHORUS   Result Value Ref Range    Phosphorus 3.6 2.5 - 4.9 MG/DL   CBC WITH AUTOMATED DIFF   Result Value Ref Range    WBC 8.9 4.6 - 13.2 K/uL    RBC 3.55 (L) 4.20 - 5.30 M/uL    HGB 10.1 (L) 12.0 - 16.0 g/dL    HCT 32.9 (L) 35.0 - 45.0 %    MCV 92.7 74.0 - 97.0 FL    MCH 28.5 24.0 - 34.0 PG    MCHC 30.7 (L) 31.0 - 37.0 g/dL    RDW 15.5 (H) 11.6 - 14.5 %    PLATELET 050 164 - 985 K/uL    MPV 9.9 9.2 - 11.8 FL    NEUTROPHILS 66 40 - 73 %    LYMPHOCYTES 15 (L) 21 - 52 %    MONOCYTES 16 (H) 3 - 10 %    EOSINOPHILS 3 0 - 5 %    BASOPHILS 0 0 - 2 %    ABS. NEUTROPHILS 5.9 1.8 - 8.0 K/UL    ABS. LYMPHOCYTES 1.3 0.9 - 3.6 K/UL    ABS. MONOCYTES 1.5 (H) 0.05 - 1.2 K/UL    ABS. EOSINOPHILS 0.2 0.0 - 0.4 K/UL    ABS.  BASOPHILS 0.0 0.0 - 0.1 K/UL    DF AUTOMATED     URINALYSIS W/ RFLX MICROSCOPIC   Result Value Ref Range    Color YELLOW      Appearance CLOUDY      Specific gravity 1.023 1.005 - 1.030      pH (UA) 5.0 5.0 - 8.0      Protein TRACE (A) NEG mg/dL    Glucose NEGATIVE  NEG mg/dL    Ketone NEGATIVE  NEG mg/dL    Bilirubin NEGATIVE  NEG      Blood MODERATE (A) NEG      Urobilinogen 0.2 0.2 - 1.0 EU/dL    Nitrites NEGATIVE  NEG      Leukocyte Esterase SMALL (A) NEG     URINE MICROSCOPIC ONLY   Result Value Ref Range    WBC 0 to 3 0 - 4 /hpf    RBC 6 to 12 0 - 5 /hpf    Epithelial cells 1+ 0 - 5 /lpf    Bacteria 3+ (A) NEG /hpf   GLUCOSE, POC   Result Value Ref Range    Glucose (POC) 107 70 - 110 mg/dL   GLUCOSE, POC   Result Value Ref Range    Glucose (POC) 141 (H) 70 - 110 mg/dL   GLUCOSE, POC   Result Value Ref Range    Glucose (POC) 122 (H) 70 - 110 mg/dL   GLUCOSE, POC   Result Value Ref Range    Glucose (POC) 120 (H) 70 - 110 mg/dL   GLUCOSE, POC   Result Value Ref Range    Glucose (POC) 105 70 - 110 mg/dL   GLUCOSE, POC   Result Value Ref Range    Glucose (POC) 90 70 - 110 mg/dL   GLUCOSE, POC   Result Value Ref Range    Glucose (POC) 111 (H) 70 - 110 mg/dL   GLUCOSE, POC   Result Value Ref Range    Glucose (POC) 126 (H) 70 - 110 mg/dL   GLUCOSE, POC   Result Value Ref Range    Glucose (POC) 125 (H) 70 - 110 mg/dL   GLUCOSE, POC   Result Value Ref Range    Glucose (POC) 120 (H) 70 - 110 mg/dL   GLUCOSE, POC   Result Value Ref Range    Glucose (POC) 144 (H) 70 - 110 mg/dL   EKG, 12 LEAD, INITIAL   Result Value Ref Range    Ventricular Rate 59 BPM    Atrial Rate 59 BPM    P-R Interval 158 ms    QRS Duration 72 ms    Q-T Interval 454 ms    QTC Calculation (Bezet) 449 ms    Calculated R Axis -26 degrees    Calculated T Axis 157 degrees    Diagnosis       Sinus bradycardia  Low voltage QRS  Nonspecific T wave abnormality  Abnormal ECG  When compared with ECG of 06-SEP-2017 11:49,  Nonspecific T wave abnormality, worse in Inferior leads  Nonspecific T wave abnormality now evident in Lateral leads  Confirmed by Pretty Perez MD, Lisa Kebede ([id number]) on 9/19/2017 10:15:29 AM           REVIEW OF LABS AND IMAGING:      Imaging Report Reviewed? NO      Images Reviewed? NO           Other Lab Data Reviewed? YES    ASSESSMENT:     ICD-10-CM ICD-9-CM    1.  Arthritis of knee M17.10 716.96 WALKER STANDARD      ELEVATED TOILET SEAT      COMMODE CHAIR      SHOWER CHAIR      REFERRAL TO [employer] 35 PLAN/DISCUSSION: I had a lengthy discussion with her about postoperative urinary retention and assured her that this is nothing uncommon nor is it usually associated with any significant problems. She has already been started on Flomax and I would like to send her home with that. Home health has been consulted to see her at home and I have asked the nursing staff to instruct them to remove her catheter early Monday the 25th. We will see her back here in the office later that week to make sure that urination is coming along. Thank you for allowing us to see your patient. Patient voices understanding and agreement to the plan. Duane Bastos, MD on 9/20/2017           Please note: This document has been produced using voice recognition software. Unrecognized errors in transcription may be present.

## 2017-09-20 NOTE — DISCHARGE INSTRUCTIONS
Osteoarthritis: Care Instructions  Your Care Instructions    Arthritis is a common health problem in which the joints are inflamed. There are several kinds of arthritis. Osteoarthritis is caused by a breakdown of cartilage, the hard, thick tissue that cushions the joints. It causes pain, stiffness, and swelling, often in the spine, fingers, hips, and knees. Osteoarthritis can happen at any age, but it is most common in older people. Osteoarthritis never goes away completely, but it can be controlled. Medicine and home treatment can reduce the pain and prevent the arthritis from getting worse. Follow-up care is a key part of your treatment and safety. Be sure to make and go to all appointments, and call your doctor if you are having problems. It's also a good idea to know your test results and keep a list of the medicines you take. How can you care for yourself at home? · Take a warm shower or bath in the morning to relieve stiffness. Avoid sitting still afterwards. · If the joint is not swollen, use moist heat, like a warm, damp towel, for 20 to 30 minutes, 2 or 3 times a day. Do not use heat on a swollen joint. · If the joint is swollen, use ice or cold packs for 10 to 20 minutes, once an hour. Cold will help relieve pain and reduce inflammation. Put a thin cloth between the ice and your skin. · To prevent stiffness, gently move the joint through its full range of motion several times a day. · If the joint hurts, avoid activities that put a strain on it for a few days. Take rest breaks throughout the day. · Get regular exercise. Walking, swimming, yoga, biking, princess chi, and water aerobics are good exercises that are gentle on the joints. · Reach and stay at a healthy weight. If you need to lose or maintain weight, regular exercise and a healthy diet will help. Extra weight can strain the joints, especially the knees and hips, and make the pain worse. Losing even a few pounds may help.   · Take pain medicines exactly as directed. ¨ If the doctor gave you a prescription medicine for pain, take it as prescribed. ¨ If you are not taking a prescription pain medicine, ask your doctor if you can take an over-the-counter medicine. When should you call for help? Call your doctor now or seek immediate medical care if:  · The pain is so bad that you cannot use the joint. · You have sudden back pain with weakness in your legs or loss of bowel or bladder control. · Your stools are black and tarlike or have streaks of blood. · You have severe pain and swelling in more than one joint. Watch closely for changes in your health, and be sure to contact your doctor if:  · You have side effects from the medicines, like belly pain, ongoing heartburn, or nausea. · Joint pain continues for more than 6 weeks, and home treatment is not helping. Where can you learn more? Go to http://gustaboSocialthingruben.info/. Enter Y929 in the search box to learn more about \"Osteoarthritis: Care Instructions. \"  Current as of: November 28, 2016  Content Version: 11.3  © 7371-0911 [employer]. Care instructions adapted under license by [employer] (which disclaims liability or warranty for this information). If you have questions about a medical condition or this instruction, always ask your healthcare professional. [place] [address] any warranty or liability for your use of this information. Total Knee Replacement: What to Expect at [address] [place] [address]    When you leave the hospital, you should be able to move around with a walker or crutches. But you will need someone to help you at home for the next few weeks or until you have more energy and can move around better. If there is no one to help you at home, you may go to a rehabilitation center. You will go home with a bandage and stitches or staples. Change the bandage as your doctor tells you to.  Your doctor will remove your stitches or staples 10 to 21 days after your surgery. You may still have some mild pain, and the area may be swollen for 3 to 6 months after surgery. Your knee will continue to improve for 6 to 12 months. You will probably use a walker for 1 to 3 weeks and then use crutches. When you are ready, you can use a cane. You will probably be able to walk on your own in 4 to 8 weeks. You will need to do months of physical rehabilitation (rehab) after a knee replacement. Rehab will help you strengthen the muscles of the knee and help you regain movement. After you recover, your artificial knee will allow you to do normal daily activities with less pain or no pain at all. You may be able to hike, dance, ride a bike, and play golf. Talk to your doctor about whether you can do more strenuous activities. Always tell your caregivers that you have an artificial knee. How long it will take to walk on your own, return to normal activities, and go back to work depends on your health and how well your rehabilitation (rehab) program goes. The better you do with your rehab exercises, the quicker you will get your strength and movement back. This care sheet gives you a general idea about how long it will take for you to recover. But each person recovers at a different pace. Follow the steps below to get better as quickly as possible. How can you care for yourself at home? Activity  · Rest when you feel tired. You may take a nap, but do not stay in bed all day. When you sit, use a chair with arms. You can use the arms to help you stand up. · Work with your physical therapist to find the best way to exercise. You may be able to take frequent, short walks using crutches or a walker. What you can do as your knee heals will depend on whether your new knee is cemented or uncemented. You may not be able to do certain things for a while if your new knee is uncemented.   · After your knee has healed enough, you can do more strenuous activities with caution. ¨ You can golf, but use a golf cart, and do not wear shoes with spikes. ¨ You can bike on a flat road or on a stationary bike. Avoid biking up hills. ¨ Your doctor may suggest that you stay away from activities that put stress on your knee. These include tennis or badminton, squash or racquetball, contact sports like football, jumping (such as in basketball), jogging, or running. ¨ Avoid activities where you might fall. These include horseback riding, skiing, and mountain biking. · Do not sit for more than 1 hour at a time. Get up and walk around for a while before you sit again. If you must sit for a long time, prop up your leg with a chair or footstool. This will help you avoid swelling. · Ask your doctor when you can shower. You may need to take sponge baths until your stitches or staples have been removed. · Ask your doctor when you can drive again. It may take up to 8 weeks after knee replacement surgery before it is safe for you to drive. · When you get into a car, sit on the edge of the seat. Then pull in your legs, and turn to face the front. · You should be able to do many everyday activities 3 to 6 weeks after your surgery. You will probably need to take 4 to 16 weeks off from work. When you can go back to work depends on the type of work you do and how you feel. · Ask your doctor when it is okay for you to have sex. · Do not lift anything heavier than 10 pounds and do not lift weights for 12 weeks. Diet  · By the time you leave the hospital, you should be eating your normal diet. If your stomach is upset, try bland, low-fat foods like plain rice, broiled chicken, toast, and yogurt. Your doctor may suggest that you take iron and vitamin supplements. · Drink plenty of fluids (unless your doctor tells you not to). · Eat healthy foods, and watch your portion sizes. Try to stay at your ideal weight. Too much weight puts more stress on your new knee.   · You may notice that your bowel movements are not regular right after your surgery. This is common. Try to avoid constipation and straining with bowel movements. You may want to take a fiber supplement every day. If you have not had a bowel movement after a couple of days, ask your doctor about taking a mild laxative. Medicines  · Your doctor will tell you if and when you can restart your medicines. He or she will also give you instructions about taking any new medicines. · If you take blood thinners, such as warfarin (Coumadin), clopidogrel (Plavix), or aspirin, be sure to talk to your doctor. He or she will tell you if and when to start taking those medicines again. Make sure that you understand exactly what your doctor wants you to do. · Your doctor may give you a blood-thinning medicine to prevent blood clots. If you take a blood thinner, be sure you get instructions about how to take your medicine safely. Blood thinners can cause serious bleeding problems. This medicine could be in pill form or as a shot (injection). If a shot is necessary, your doctor will tell you how to do this. · Be safe with medicines. Take pain medicines exactly as directed. ¨ If the doctor gave you a prescription medicine for pain, take it as prescribed. ¨ If you are not taking a prescription pain medicine, ask your doctor if you can take an over-the-counter medicine. ¨ Plan to take your pain medicine 30 minutes before exercises. It is easier to prevent pain before it starts than to stop it once it has started. · If you think your pain medicine is making you sick to your stomach:  ¨ Take your medicine after meals (unless your doctor has told you not to). ¨ Ask your doctor for a different pain medicine. · If your doctor prescribed antibiotics, take them as directed. Do not stop taking them just because you feel better. You need to take the full course of antibiotics.   Incision care  · You will have a bandage over the cut (incision) and staples or stitches. Take the bandage off when your doctor says it is okay. · Your doctor will remove the staples or stitches 10 days to 3 weeks after the surgery and replace them with strips of tape. Leave the tape on for a week or until it falls off. Exercise  · Your rehab program will give you a number of exercises to do to help you get back your knee's range of motion and strength. Always do them as your therapist tells you. Ice and elevation  · For pain and swelling, put ice or a cold pack on the area for 10 to 20 minutes at a time. Put a thin cloth between the ice and your skin. Other instructions  · Continue to wear your support stockings as your doctor says. These help to prevent blood clots. The length of time that you will have to wear them depends on your activity level and the amount of swelling. · Wear medical alert jewelry that says you may need antibiotics before any procedure, including dental work. You can buy this at most drugstores. · You have metal pieces in your knee. These may set off some airport metal detectors. Carry a medical alert card that says you have an artificial joint, just in case. Follow-up care is a key part of your treatment and safety. Be sure to make and go to all appointments, and call your doctor if you are having problems. It's also a good idea to know your test results and keep a list of the medicines you take. When should you call for help? Call 911 anytime you think you may need emergency care. For example, call if:  · You passed out (lost consciousness). · You have severe trouble breathing. · You have sudden chest pain and shortness of breath, or you cough up blood. Call your doctor now or seek immediate medical care if:  · You have signs of infection, such as:  ¨ Increased pain, swelling, warmth, or redness. ¨ Red streaks leading from the incision. ¨ Pus draining from the incision. ¨ A fever.   · You have signs of a blood clot, such as:  ¨ Pain in your calf, back of the knee, thigh, or groin. ¨ Redness and swelling in your leg or groin. · Your incision comes open and begins to bleed, or the bleeding increases. · You have pain that does not get better after you take pain medicine. Watch closely for changes in your health, and be sure to contact your doctor if:  · You do not have a bowel movement after taking a laxative. Where can you learn more? Go to http://gustabo-ruben.info/. Enter V734 in the search box to learn more about \"Total Knee Replacement: What to Expect at Home. \"  Current as of: March 21, 2017  Content Version: 11.3  © 4338-5885 [employer]. Care instructions adapted under license by [employer] (which disclaims liability or warranty for this information). If you have questions about a medical condition or this instruction, always ask your healthcare professional. [place] [address] any warranty or liability for your use of this information. Patient armband removed and shredded  DISCHARGE SUMMARY from Nurse    The following personal items are in your possession at time of discharge:    Dental Appliances: None  Visual Aid: None     Home Medications: None  Jewelry: None  Clothing: Undergarments, Pants, Shirt, Socks, Footwear  Other Valuables: Eyeglasses, Cane             PATIENT INSTRUCTIONS:    After general anesthesia or intravenous sedation, for 24 hours or while taking prescription Narcotics:  · Limit your activities  · Do not drive and operate hazardous machinery  · Do not make important personal or business decisions  · Do  not drink alcoholic beverages  · If you have not urinated within 8 hours after discharge, please contact your surgeon on call.     Report the following to your surgeon:  · Excessive pain, swelling, redness or odor of or around the surgical area  · Temperature over 100.5  · Nausea and vomiting lasting longer than 4 hours or if unable to take medications  · Any signs of decreased circulation or nerve impairment to extremity: change in color, persistent  numbness, tingling, coldness or increase pain  · Any questions        What to do at Home:  Recommended activity: Activity as tolerated    If you experience any of the following symptoms uncontrolled pain, redness, swelling or drainage from site, fever greater than 100.5, nausea, vomiting, diarrhea, constipation, short of breath, chest pains please follow up with PCP. *  Please give a list of your current medications to your Primary Care Provider. *  Please update this list whenever your medications are discontinued, doses are      changed, or new medications (including over-the-counter products) are added. *  Please carry medication information at all times in case of emergency situations. These are general instructions for a healthy lifestyle:    No smoking/ No tobacco products/ Avoid exposure to second hand smoke    Surgeon General's Warning:  Quitting smoking now greatly reduces serious risk to your health. Obesity, smoking, and sedentary lifestyle greatly increases your risk for illness    A healthy diet, regular physical exercise & weight monitoring are important for maintaining a healthy lifestyle    You may be retaining fluid if you have a history of heart failure or if you experience any of the following symptoms:  Weight gain of 3 pounds or more overnight or 5 pounds in a week, increased swelling in our hands or feet or shortness of breath while lying flat in bed. Please call your doctor as soon as you notice any of these symptoms; do not wait until your next office visit. Recognize signs and symptoms of STROKE:    F-face looks uneven    A-arms unable to move or move unevenly    S-speech slurred or non-existent    T-time-call 911 as soon as signs and symptoms begin-DO NOT go       Back to bed or wait to see if you get better-TIME IS BRAIN.     Warning Signs of HEART ATTACK     Call 911 if you have these symptoms:   Chest discomfort. Most heart attacks involve discomfort in the center of the chest that lasts more than a few minutes, or that goes away and comes back. It can feel like uncomfortable pressure, squeezing, fullness, or pain.  Discomfort in other areas of the upper body. Symptoms can include pain or discomfort in one or both arms, the back, neck, jaw, or stomach.  Shortness of breath with or without chest discomfort.  Other signs may include breaking out in a cold sweat, nausea, or lightheadedness. Don't wait more than five minutes to call 911 - MINUTES MATTER! Fast action can save your life. Calling 911 is almost always the fastest way to get lifesaving treatment. Emergency Medical Services staff can begin treatment when they arrive -- up to an hour sooner than if someone gets to the hospital by car. The discharge information has been reviewed with the patient. The patient verbalized understanding. Discharge medications reviewed with the patient and appropriate educational materials and side effects teaching were provided. [employer] Activation    Thank you for requesting access to [employer]. Please follow the instructions below to securely access and download your online medical record. [facility] allows you to send messages to your doctor, view your test results, renew your prescriptions, schedule appointments, and more. How Do I Sign Up? 1. In your internet browser, go to [email or web address]  2. Click on the First Time User? Click Here link in the Sign In box. You will be redirect to the New Member Sign Up page. 3. Enter your [employer] Access Code exactly as it appears below. You will not need to use this code after youve completed the sign-up process. If you do not sign up before the expiration date, you must request a new code. [employer] Access Code:  Activation code not generated  Current [facility] Status: Patient Declined (This is the date your [employer] access code will )    4. Enter the last four digits of your Social Security Number (xxxx) and Date of Birth (mm/dd/yyyy) as indicated and click Submit. You will be taken to the next sign-up page. 5. Create a [employer]eg[employer] ID. This will be your [employer] login ID and cannot be changed, so think of one that is secure and easy to remember. 6. Create a [employer] password. You can change your password at any time. 7. Enter your Password Reset Question and Answer. This can be used at a later time if you forget your password. 8. Enter your e-mail address. You will receive e-mail notification when new information is available in [address] 9. Click Sign Up. You can now view and download portions of your medical record. 10. Click the Download Summary menu link to download a portable copy of your medical information. Additional Information    If you have questions, please visit the Frequently Asked Questions section of the [employer] website at [email or web address] Remember, [employer] is NOT to be used for urgent needs. For medical emergencies, dial 911.

## 2017-09-21 ENCOUNTER — HOME CARE VISIT (OUTPATIENT)
Dept: SCHEDULING | Facility: HOME HEALTH | Age: 67
End: 2017-09-21
Payer: MEDICARE

## 2017-09-21 ENCOUNTER — HOME CARE VISIT (OUTPATIENT)
Dept: HOME HEALTH SERVICES | Facility: HOME HEALTH | Age: 67
End: 2017-09-21

## 2017-09-21 PROCEDURE — 400013 HH SOC

## 2017-09-21 PROCEDURE — 3331090002 HH PPS REVENUE DEBIT

## 2017-09-21 PROCEDURE — 3331090001 HH PPS REVENUE CREDIT

## 2017-09-21 PROCEDURE — G0151 HHCP-SERV OF PT,EA 15 MIN: HCPCS

## 2017-09-21 PROCEDURE — G0299 HHS/HOSPICE OF RN EA 15 MIN: HCPCS

## 2017-09-21 NOTE — HOME CARE
Notified this morning by  5N nurse Amita Anthony) that orders for [facility] was added to chart by urologist yesterday evening ; [facility] orders regarding parker to be d/c'd on Monday (9/25) were added to [facility] referral and [facility] central intake (Herb Bhatt) was notified of updated orders to referral. Kellie Hamilton.

## 2017-09-22 ENCOUNTER — HOME CARE VISIT (OUTPATIENT)
Dept: HOME HEALTH SERVICES | Facility: HOME HEALTH | Age: 67
End: 2017-09-22
Payer: MEDICARE

## 2017-09-22 VITALS
SYSTOLIC BLOOD PRESSURE: 115 MMHG | TEMPERATURE: 98.8 F | HEART RATE: 77 BPM | DIASTOLIC BLOOD PRESSURE: 74 MMHG | OXYGEN SATURATION: 97 %

## 2017-09-22 VITALS
DIASTOLIC BLOOD PRESSURE: 42 MMHG | TEMPERATURE: 99.3 F | OXYGEN SATURATION: 93 % | RESPIRATION RATE: 18 BRPM | SYSTOLIC BLOOD PRESSURE: 106 MMHG | HEART RATE: 72 BPM

## 2017-09-22 VITALS — DIASTOLIC BLOOD PRESSURE: 70 MMHG | OXYGEN SATURATION: 98 % | SYSTOLIC BLOOD PRESSURE: 120 MMHG | HEART RATE: 68 BPM

## 2017-09-22 LAB
BACTERIA SPEC CULT: NORMAL
SERVICE CMNT-IMP: NORMAL

## 2017-09-22 PROCEDURE — 3331090001 HH PPS REVENUE CREDIT

## 2017-09-22 PROCEDURE — 3331090002 HH PPS REVENUE DEBIT

## 2017-09-22 PROCEDURE — G0157 HHC PT ASSISTANT EA 15: HCPCS

## 2017-09-23 ENCOUNTER — HOME CARE VISIT (OUTPATIENT)
Dept: SCHEDULING | Facility: HOME HEALTH | Age: 67
End: 2017-09-23
Payer: MEDICARE

## 2017-09-23 VITALS
OXYGEN SATURATION: 96 % | TEMPERATURE: 98 F | SYSTOLIC BLOOD PRESSURE: 124 MMHG | DIASTOLIC BLOOD PRESSURE: 76 MMHG | HEART RATE: 68 BPM

## 2017-09-23 PROCEDURE — 3331090001 HH PPS REVENUE CREDIT

## 2017-09-23 PROCEDURE — 3331090002 HH PPS REVENUE DEBIT

## 2017-09-23 PROCEDURE — G0157 HHC PT ASSISTANT EA 15: HCPCS

## 2017-09-24 ENCOUNTER — HOME CARE VISIT (OUTPATIENT)
Dept: SCHEDULING | Facility: HOME HEALTH | Age: 67
End: 2017-09-24
Payer: MEDICARE

## 2017-09-24 VITALS
SYSTOLIC BLOOD PRESSURE: 124 MMHG | HEART RATE: 68 BPM | TEMPERATURE: 98.5 F | DIASTOLIC BLOOD PRESSURE: 72 MMHG | OXYGEN SATURATION: 98 %

## 2017-09-24 PROCEDURE — 3331090002 HH PPS REVENUE DEBIT

## 2017-09-24 PROCEDURE — G0157 HHC PT ASSISTANT EA 15: HCPCS

## 2017-09-24 PROCEDURE — 3331090001 HH PPS REVENUE CREDIT

## 2017-09-25 ENCOUNTER — TELEPHONE (OUTPATIENT)
Dept: MEDSURG UNIT | Age: 67
End: 2017-09-25

## 2017-09-25 ENCOUNTER — HOME CARE VISIT (OUTPATIENT)
Dept: SCHEDULING | Facility: HOME HEALTH | Age: 67
End: 2017-09-25
Payer: MEDICARE

## 2017-09-25 ENCOUNTER — HOME CARE VISIT (OUTPATIENT)
Dept: HOME HEALTH SERVICES | Facility: HOME HEALTH | Age: 67
End: 2017-09-25
Payer: MEDICARE

## 2017-09-25 VITALS — DIASTOLIC BLOOD PRESSURE: 75 MMHG | HEART RATE: 72 BPM | OXYGEN SATURATION: 97 % | SYSTOLIC BLOOD PRESSURE: 110 MMHG

## 2017-09-25 PROCEDURE — G0157 HHC PT ASSISTANT EA 15: HCPCS

## 2017-09-25 PROCEDURE — 3331090001 HH PPS REVENUE CREDIT

## 2017-09-25 PROCEDURE — 3331090002 HH PPS REVENUE DEBIT

## 2017-09-25 PROCEDURE — G0299 HHS/HOSPICE OF RN EA 15 MIN: HCPCS

## 2017-09-25 NOTE — TELEPHONE ENCOUNTER
Home Health Agency:  [facility]   PT:  yes   Nursing:  yes    Pain Control:  Reports good pain control, only taking pain medication twice a day. Questions/Concerns:  Currently waiting for the home health nurse to come change her dressing and remove the parker catheter. Expressing some anxiety about voiding after catheter removal.  Instructed her to drink lots of fluids. States she is walking well and doing her exercises. Reports no problems with constipation.

## 2017-09-26 ENCOUNTER — HOME CARE VISIT (OUTPATIENT)
Dept: SCHEDULING | Facility: HOME HEALTH | Age: 67
End: 2017-09-26
Payer: MEDICARE

## 2017-09-26 VITALS
SYSTOLIC BLOOD PRESSURE: 115 MMHG | DIASTOLIC BLOOD PRESSURE: 58 MMHG | RESPIRATION RATE: 18 BRPM | TEMPERATURE: 98.5 F | OXYGEN SATURATION: 96 % | HEART RATE: 61 BPM

## 2017-09-26 PROCEDURE — G0151 HHCP-SERV OF PT,EA 15 MIN: HCPCS

## 2017-09-26 PROCEDURE — 3331090002 HH PPS REVENUE DEBIT

## 2017-09-26 PROCEDURE — 3331090001 HH PPS REVENUE CREDIT

## 2017-09-27 ENCOUNTER — HOME CARE VISIT (OUTPATIENT)
Dept: SCHEDULING | Facility: HOME HEALTH | Age: 67
End: 2017-09-27
Payer: MEDICARE

## 2017-09-27 VITALS — DIASTOLIC BLOOD PRESSURE: 80 MMHG | SYSTOLIC BLOOD PRESSURE: 110 MMHG | HEART RATE: 83 BPM | OXYGEN SATURATION: 96 %

## 2017-09-27 PROCEDURE — 3331090001 HH PPS REVENUE CREDIT

## 2017-09-27 PROCEDURE — G0157 HHC PT ASSISTANT EA 15: HCPCS

## 2017-09-27 PROCEDURE — 3331090002 HH PPS REVENUE DEBIT

## 2017-09-28 ENCOUNTER — HOME CARE VISIT (OUTPATIENT)
Dept: SCHEDULING | Facility: HOME HEALTH | Age: 67
End: 2017-09-28
Payer: MEDICARE

## 2017-09-28 VITALS
OXYGEN SATURATION: 98 % | HEART RATE: 70 BPM | HEART RATE: 64 BPM | OXYGEN SATURATION: 98 % | DIASTOLIC BLOOD PRESSURE: 80 MMHG | DIASTOLIC BLOOD PRESSURE: 70 MMHG | SYSTOLIC BLOOD PRESSURE: 120 MMHG | SYSTOLIC BLOOD PRESSURE: 120 MMHG

## 2017-09-28 PROCEDURE — G0299 HHS/HOSPICE OF RN EA 15 MIN: HCPCS

## 2017-09-28 PROCEDURE — 3331090001 HH PPS REVENUE CREDIT

## 2017-09-28 PROCEDURE — 3331090002 HH PPS REVENUE DEBIT

## 2017-09-28 PROCEDURE — G0157 HHC PT ASSISTANT EA 15: HCPCS

## 2017-09-29 ENCOUNTER — HOME CARE VISIT (OUTPATIENT)
Dept: SCHEDULING | Facility: HOME HEALTH | Age: 67
End: 2017-09-29
Payer: MEDICARE

## 2017-09-29 VITALS
SYSTOLIC BLOOD PRESSURE: 104 MMHG | HEART RATE: 62 BPM | TEMPERATURE: 98.7 F | DIASTOLIC BLOOD PRESSURE: 57 MMHG | RESPIRATION RATE: 18 BRPM | OXYGEN SATURATION: 96 %

## 2017-09-29 VITALS — SYSTOLIC BLOOD PRESSURE: 115 MMHG | HEART RATE: 62 BPM | DIASTOLIC BLOOD PRESSURE: 75 MMHG | OXYGEN SATURATION: 95 %

## 2017-09-29 PROCEDURE — 3331090002 HH PPS REVENUE DEBIT

## 2017-09-29 PROCEDURE — G0157 HHC PT ASSISTANT EA 15: HCPCS

## 2017-09-29 PROCEDURE — 3331090001 HH PPS REVENUE CREDIT

## 2017-09-30 ENCOUNTER — HOME CARE VISIT (OUTPATIENT)
Dept: SCHEDULING | Facility: HOME HEALTH | Age: 67
End: 2017-09-30
Payer: MEDICARE

## 2017-09-30 PROCEDURE — G0157 HHC PT ASSISTANT EA 15: HCPCS

## 2017-09-30 PROCEDURE — 3331090002 HH PPS REVENUE DEBIT

## 2017-09-30 PROCEDURE — 3331090001 HH PPS REVENUE CREDIT

## 2017-10-01 ENCOUNTER — HOME CARE VISIT (OUTPATIENT)
Dept: SCHEDULING | Facility: HOME HEALTH | Age: 67
End: 2017-10-01
Payer: MEDICARE

## 2017-10-01 PROCEDURE — 3331090002 HH PPS REVENUE DEBIT

## 2017-10-01 PROCEDURE — 3331090001 HH PPS REVENUE CREDIT

## 2017-10-01 PROCEDURE — G0157 HHC PT ASSISTANT EA 15: HCPCS

## 2017-10-02 ENCOUNTER — HOME CARE VISIT (OUTPATIENT)
Dept: SCHEDULING | Facility: HOME HEALTH | Age: 67
End: 2017-10-02
Payer: MEDICARE

## 2017-10-02 VITALS
OXYGEN SATURATION: 98 % | DIASTOLIC BLOOD PRESSURE: 82 MMHG | SYSTOLIC BLOOD PRESSURE: 142 MMHG | HEART RATE: 50 BPM | SYSTOLIC BLOOD PRESSURE: 126 MMHG | HEART RATE: 56 BPM | DIASTOLIC BLOOD PRESSURE: 82 MMHG | OXYGEN SATURATION: 99 %

## 2017-10-02 VITALS — HEART RATE: 60 BPM | OXYGEN SATURATION: 97 % | SYSTOLIC BLOOD PRESSURE: 120 MMHG | DIASTOLIC BLOOD PRESSURE: 80 MMHG

## 2017-10-02 PROCEDURE — 3331090001 HH PPS REVENUE CREDIT

## 2017-10-02 PROCEDURE — G0157 HHC PT ASSISTANT EA 15: HCPCS

## 2017-10-02 PROCEDURE — 3331090002 HH PPS REVENUE DEBIT

## 2017-10-03 ENCOUNTER — HOME CARE VISIT (OUTPATIENT)
Dept: SCHEDULING | Facility: HOME HEALTH | Age: 67
End: 2017-10-03
Payer: MEDICARE

## 2017-10-03 VITALS — OXYGEN SATURATION: 97 % | DIASTOLIC BLOOD PRESSURE: 80 MMHG | HEART RATE: 58 BPM | SYSTOLIC BLOOD PRESSURE: 120 MMHG

## 2017-10-03 PROCEDURE — G0157 HHC PT ASSISTANT EA 15: HCPCS

## 2017-10-03 PROCEDURE — 3331090001 HH PPS REVENUE CREDIT

## 2017-10-03 PROCEDURE — 3331090002 HH PPS REVENUE DEBIT

## 2017-10-04 ENCOUNTER — OFFICE VISIT (OUTPATIENT)
Dept: ORTHOPEDIC SURGERY | Facility: CLINIC | Age: 67
End: 2017-10-04

## 2017-10-04 ENCOUNTER — HOME CARE VISIT (OUTPATIENT)
Dept: SCHEDULING | Facility: HOME HEALTH | Age: 67
End: 2017-10-04
Payer: MEDICARE

## 2017-10-04 VITALS
RESPIRATION RATE: 18 BRPM | OXYGEN SATURATION: 94 % | TEMPERATURE: 98.5 F | HEIGHT: 67 IN | BODY MASS INDEX: 35.35 KG/M2 | SYSTOLIC BLOOD PRESSURE: 114 MMHG | HEART RATE: 79 BPM | WEIGHT: 225.2 LBS | DIASTOLIC BLOOD PRESSURE: 50 MMHG

## 2017-10-04 VITALS — SYSTOLIC BLOOD PRESSURE: 140 MMHG | HEART RATE: 76 BPM | DIASTOLIC BLOOD PRESSURE: 85 MMHG | OXYGEN SATURATION: 97 %

## 2017-10-04 DIAGNOSIS — Z96.651 S/P TOTAL KNEE REPLACEMENT USING CEMENT, RIGHT: Primary | ICD-10-CM

## 2017-10-04 PROCEDURE — 3331090001 HH PPS REVENUE CREDIT

## 2017-10-04 PROCEDURE — G0157 HHC PT ASSISTANT EA 15: HCPCS

## 2017-10-04 PROCEDURE — 3331090002 HH PPS REVENUE DEBIT

## 2017-10-04 RX ORDER — OXYCODONE AND ACETAMINOPHEN 7.5; 325 MG/1; MG/1
1 TABLET ORAL
Qty: 56 TAB | Refills: 0 | Status: SHIPPED | OUTPATIENT
Start: 2017-10-04 | End: 2018-01-23

## 2017-10-04 RX ORDER — DOCUSATE SODIUM 100 MG/1
100 CAPSULE, LIQUID FILLED ORAL 2 TIMES DAILY
COMMUNITY
End: 2018-01-23

## 2017-10-04 NOTE — PROGRESS NOTES
[address] [place] [address]  [phone]           Patient: Judah Tate                MRN: [id number]       SSN: [id number]  YOB: 1950        AGE: 74 y.o. SEX: female  Body mass index is 35.27 kg/(m^2). PCP: Laila Shafer MD  10/04/17      This office note has been dictated. REVIEW OF SYSTEMS:  Constitutional: Negative for fever, chills, weight loss and malaise/fatigue. HENT: Negative. Eyes: Negative. Respiratory: Negative. Cardiovascular: Negative. Gastrointestinal: No bowel incontinence or constipation. Genitourinary: No bladder incontinence or saddle anesthesia. Skin: Negative. Neurological: Negative. Endo/Heme/Allergies: Negative. Psychiatric/Behavioral: Negative. Musculoskeletal: As per HPI above. Past Medical History:   Diagnosis Date    Breast cyst 1994    benign, removed    Deviated septum 04/2010    septoplasty    Diabetes mellitus     Type 2    GERD (gastroesophageal reflux disease)     severe    Hypertension     Hypothyroidism     Other elective surgery July 2008    toe surgery    Pure hypercholesterolemia     Stress thallium 09/30/2009    No evidence of ischemia or infarction; EF 78%; EKG portion negative with exercise capacity below average for age at 6 min of exercise    Varicose veins     closure in right leg 2008 & left leg 2007    Venous reflux study 06/24/2014    No DVT. [facility] occlusions of bilateral GSV. Current Outpatient Prescriptions:     docusate sodium (COLACE) 100 mg capsule, Take 100 mg by mouth two (2) times a day., Disp: , Rfl:     celecoxib (CELEBREX) 200 mg capsule, Take 1 Cap by mouth two (2) times a day for 90 days. , Disp: 60 Cap, Rfl: 2    ferrous sulfate 325 mg (65 mg iron) tablet, Take 1 Tab by mouth two (2) times daily (with meals). , Disp: 60 Tab, Rfl: 2    oxyCODONE-acetaminophen (PERCOCET 7.5) 7.5-325 mg per tablet, Take 1-2 Tabs by mouth every four (4) hours as needed. Max Daily Amount: 12 Tabs. (Patient taking differently: Take 1-2 Tabs by mouth every four (4) hours as needed for Pain.), Disp: 60 Tab, Rfl: 0    aspirin (ASPIRIN) 325 mg tablet, Take 1 Tab by mouth daily. , Disp: 30 Tab, Rfl: 0    cetirizine (ZYRTEC) 10 mg tablet, Take 10 mg by mouth daily. , Disp: , Rfl:     metoprolol succinate (TOPROL-XL) 100 mg tablet, TAKE ONE TABLET BY MOUTH DAILY, Disp: 30 Tab, Rfl: 6    losartan (COZAAR) 100 mg tablet, Take 100 mg by mouth daily. , Disp: , Rfl:     escitalopram oxalate (LEXAPRO) 10 mg tablet, Take 10 mg by mouth daily. , Disp: , Rfl:     pantoprazole (PROTONIX) 40 mg tablet, Take 40 mg by mouth two (2) times a day., Disp: , Rfl:     metformin (GLUCOPHAGE) 500 mg tablet, Take 500 mg by mouth daily (with breakfast). , Disp: , Rfl:     levothyroxine (LEVOXYL) 88 mcg tablet, Take 88 mcg by mouth daily. , Disp: , Rfl:     lorazepam (ATIVAN) 1 mg tablet, Take 0.5 mg by mouth two (2) times a day., Disp: , Rfl:     ERGOCALCIFEROL, VITAMIN D2, (VITAMIN D PO), Take 50,000 Units by mouth every Monday., Disp: , Rfl:     tamsulosin (FLOMAX) 0.4 mg capsule, Take 1 Cap by mouth daily. , Disp: 30 Cap, Rfl: 3    Allergies   Allergen Reactions    Pcn [Penicillins] Unknown (comments)       Social History     Social History    Marital status:      Spouse name: N/A    Number of children: N/A    Years of education: N/A     Occupational History    Not on file.      Social History Main Topics    Smoking status: Never Smoker    Smokeless tobacco: Never Used    Alcohol use No    Drug use: No    Sexual activity: Not on file     Other Topics Concern    Not on file     Social History Narrative       Past Surgical History:   Procedure Laterality Date    CYSTOTOMY,EXCIS BLADDER TUMOR  2006    HX HYSTERECTOMY      HX PARTIAL HYSTERECTOMY  1994    ND EGD [facility] SPHNCTR/CARDIA GERD     [address] We did see Ms. Leal for followup with regards to her right knee replacement. The patient is 16 days status post surgery and she is doing quite well. She is quite happy with the results of the knee replacement, working very hard on her own, as well as in physical therapy. She has had no troubles with the wound. No fevers, chills, systemic changes, and no injuries to report and no chest pain or shortness of breath. PHYSICAL EXAMINATION:  In general, the patient is alert and oriented x 3 in no acute distress. The patient is well-developed, well-nourished, with a normal affect. The patient is afebrile. Examination of the right knee reveals the skin is intact. There is no ecchymosis, no warmth, and no signs of infection or cellulitis present. Range of motion reveals full extension to about 95° of flexion. The patella tracks nicely. Stability is quite good. There is no cyanosis or clubbing distally. There is mild edema without calf tenderness. There is negative Elroys. There is no evidence of DVT present. ASSESSMENT:  Status post right knee replacement. PLAN:  At this point, the staples are removed and replaced with Steri-Strips without complications. She will be set up with outpatient physical therapy. She is given a refill of her pain medicine. She was instructed on range of motion activities on her own on an hourly basis for flexion and extension. We will have her followup with us in the office in two weeks time for evaluation and range of motion check. She will call with any questions or concerns that shall arise.                JR Teofilo GOVEA, NORRIS, ATC

## 2017-10-05 ENCOUNTER — HOME CARE VISIT (OUTPATIENT)
Dept: SCHEDULING | Facility: HOME HEALTH | Age: 67
End: 2017-10-05
Payer: MEDICARE

## 2017-10-05 PROCEDURE — 3331090001 HH PPS REVENUE CREDIT

## 2017-10-05 PROCEDURE — G0151 HHCP-SERV OF PT,EA 15 MIN: HCPCS

## 2017-10-05 PROCEDURE — 3331090002 HH PPS REVENUE DEBIT

## 2017-10-06 PROCEDURE — 3331090002 HH PPS REVENUE DEBIT

## 2017-10-06 PROCEDURE — 3331090001 HH PPS REVENUE CREDIT

## 2017-10-07 PROCEDURE — 3331090002 HH PPS REVENUE DEBIT

## 2017-10-07 PROCEDURE — 3331090001 HH PPS REVENUE CREDIT

## 2017-10-08 PROCEDURE — 3331090001 HH PPS REVENUE CREDIT

## 2017-10-08 PROCEDURE — 3331090002 HH PPS REVENUE DEBIT

## 2017-10-09 ENCOUNTER — HOSPITAL ENCOUNTER (OUTPATIENT)
Dept: PHYSICAL THERAPY | Age: 67
Discharge: HOME | End: 2017-10-09
Payer: COMMERCIAL

## 2017-10-09 PROCEDURE — 3331090001 HH PPS REVENUE CREDIT

## 2017-10-09 PROCEDURE — 97161 PT EVAL LOW COMPLEX 20 MIN: CPT

## 2017-10-09 PROCEDURE — G8982 BODY POS GOAL STATUS: HCPCS

## 2017-10-09 PROCEDURE — 97116 GAIT TRAINING THERAPY: CPT

## 2017-10-09 PROCEDURE — G8981 BODY POS CURRENT STATUS: HCPCS

## 2017-10-09 PROCEDURE — 3331090002 HH PPS REVENUE DEBIT

## 2017-10-09 PROCEDURE — 97110 THERAPEUTIC EXERCISES: CPT

## 2017-10-09 NOTE — PROGRESS NOTES
[facility]  [address]  [place], [address]  [phone] fax    Plan of Care/ Statement of Necessity for Physical Therapy Services    Patient name: Gaye Gonzalez Start of Care: 10/9/2017   Referral source: Pollo Fofana : 1950    Medical Diagnosis: Presence of right artificial knee joint [Z96.651]   Onset Date:2017    Treatment Diagnosis: R TKA   Prior Hospitalization: see medical history Provider#: [id number]   Medications: Verified on Patient summary List    Comorbidities: heart dz, depression, diabetes, arthritis, thyroid problems, HTN   Prior Level of Function: Pt ambulated with [facility] prior to surgery      The Plan of Care and following information is based on the information from the initial evaluation. Assessment/ key information: Pt is a 75 y/o F presenting s/p R TKA on  secondary to joint OA. She completed 2 weeks of HHPT which just ended last week. Incision site shows no signs of infection with steri-strips intact. Pt shows good stability ambulating with [facility] today, encouraged her to reduce RW ambulation as tolerated. Pt would benefit from PT to improve knee ROM, flexibility and strength for improved functional status.     Evaluation Complexity History HIGH Complexity :3+ comorbidities / personal factors will impact the outcome/ POC ; Examination LOW Complexity : 1-2 Standardized tests and measures addressing body structure, function, activity limitation and / or participation in recreation  ;Presentation LOW Complexity : Stable, uncomplicated  ;Clinical Decision Making MEDIUM Complexity : FOTO score of 26-74  Overall Complexity Rating: LOW   Problem List: pain affecting function, decrease ROM, decrease strength, edema affecting function, impaired gait/ balance, decrease ADL/ functional abilitiies, decrease activity tolerance, decrease flexibility/ joint mobility and decrease transfer abilities   Treatment Plan may include any combination of the following: Therapeutic exercise, Therapeutic activities, Neuromuscular re-education, Physical agent/modality, Gait/balance training, Manual therapy, Patient education, Self Care training, Functional mobility training and Stair training  Patient / Family readiness to learn indicated by: asking questions and trying to perform skills  Persons(s) to be included in education: patient (P)  Barriers to Learning/Limitations: none  Patient Goal (s): Try to be able to walk without the cane  Patient Self Reported Health Status: good  Rehabilitation Potential: good    Short Term Goals: To be accomplished in 2 treatments:  1. Pt will demonstrate I and compliance with HEP to promote active role in rehabilitation. 2. Pt will improve R knee extension to <5 deg for improved gait efficiency. Long Term Goals: To be accomplished in 4 weeks:  1. Pt will improve FOTO score to 47 to demonstrate improved quality of life. 2. Pt will demonstrate R knee AROM flexion to 120 deg for improved ease of stair negotiation. 3. Pt will improve R knee and hip strength to 4/5 for improved stability with ADLs. 4. Pt will perform 10 sit to stands from table without UE use to improve ease of transfers in the home. Frequency / Duration: Patient to be seen 2 times per week for 4 weeks. Patient/ Caregiver education and instruction: Diagnosis, prognosis, self care, activity modification and exercises   [x]  Plan of care has been reviewed with PTA    G-Codes (GP)  Position   Current  CL= 60-79%   Goal  CK= 40-59%      The severity rating is based on clinical judgment and the FOTO score. Certification Period: 10/9/2017 to 12/4/2017  Laureen Beth DPT, CMTPT 10/9/2017 3:19 PM    ________________________________________________________________________    I certify that the above Therapy Services are being furnished while the patient is under my care.  I agree with the treatment plan and certify that this therapy is necessary.     [de-identified] Signature:____________________  Date:____________Time: _________    Please sign and return to [facility]  [address]  [place], [address]  [phone] fax

## 2017-10-09 NOTE — PROGRESS NOTES
PT DAILY TREATMENT NOTE - [facility]     Patient Name: Marshal Harmon  Date:10/9/2017  : 1950  [x]  Patient  Verified  Payor: [place] MED[place] / Plan: [place] MEDICARE PART A / Product Type: Medicare /    In time:2:41  Out time:3:14  Total Treatment Time (min): 33  Total Timed Codes (min): 23  1:1 Treatment Time (MC only): 33   Visit #: 1 of 8    Treatment Area: Presence of right artificial knee joint [Z96.651]    SUBJECTIVE  Pain Level (0-10 scale): 4  Any medication changes, allergies to medications, adverse drug reactions, diagnosis change, or new procedure performed?: [x] No    [] Yes (see summary sheet for update)  Subjective functional status/changes:   [] No changes reported  Pt reports goal to hopefully ambulate with cane    OBJECTIVE    10 min [x]Eval                  []Re-Eval       15 min Therapeutic Exercise:  [] See flow sheet :   Rationale: increase ROM and increase strength to improve the patients ability to perform daily tasks with increased ease and stability    8 min Gait Trainin'x2 feet with SPC device on level surfaces with SBA level of assist   Rationale: improve gait efficiency          With   [] TE   [] TA   [] neuro   [] other: Patient Education: [x] Review HEP    [] Progressed/Changed HEP based on:   [] positioning   [] body mechanics   [] transfers   [] heat/ice application    [] other:      Other Objective/Functional Measures: pt shows good stability with SPC ambulation    Some warmth R knee joint with edema present, no visible signs of infection     Pain Level (0-10 scale) post treatment: 3    ASSESSMENT/Changes in Function: see POC    Patient will continue to benefit from skilled PT services to modify and progress therapeutic interventions, address functional mobility deficits, address ROM deficits, address strength deficits, analyze and address soft tissue restrictions, analyze and cue movement patterns and analyze and modify body mechanics/ergonomics to attain remaining goals. []  See Plan of Care  []  See progress note/recertification  []  See Discharge Summary         Progress towards goals / Updated goals:  Short Term Goals: To be accomplished in 2 treatments:  1. Pt will demonstrate I and compliance with HEP to promote active role in rehabilitation. 2. Pt will improve R knee extension to <5 deg for improved gait efficiency. Long Term Goals: To be accomplished in 4 weeks:  1. Pt will improve FOTO score to 47 to demonstrate improved quality of life. 2. Pt will demonstrate R knee AROM flexion to 120 deg for improved ease of stair negotiation. 3. Pt will improve R knee and hip strength to 4/5 for improved stability with ADLs. 4. Pt will perform 10 sit to stands from table without UE use to improve ease of transfers in the home.     PLAN  []  Upgrade activities as tolerated     [x]  Continue plan of care  []  Update interventions per flow sheet       []  Discharge due to:_  []  Other:_      Marianne Avitia DPT, CMTPT 10/9/2017  4:11 PM    Future Appointments  Date Time Provider Brenden Padilla   10/11/2017 2:30 PM Dayday Dash PT [facility]   10/17/2017 12:00 PM Gray Mccarthy [facility]   10/18/2017 8:45 AM Evans Plunkett PA-C [facility]   10/19/2017 10:00 AM Dayday Dash PT [facility]   10/24/2017 12:00 PM Margareth Briones [facility]   10/26/2017 11:00 AM Margareth Briones [facility]   10/31/2017 12:30 PM Marianne Avitia [facility]   11/2/2017 11:30 AM Marianne Avitia [facility]   1/8/2018 2:40 PM Luz Mcdermott, DO [address] [place] [address]

## 2017-10-10 PROCEDURE — 3331090001 HH PPS REVENUE CREDIT

## 2017-10-10 PROCEDURE — 3331090002 HH PPS REVENUE DEBIT

## 2017-10-11 ENCOUNTER — HOSPITAL ENCOUNTER (OUTPATIENT)
Dept: PHYSICAL THERAPY | Age: 67
Discharge: HOME | End: 2017-10-11
Payer: COMMERCIAL

## 2017-10-11 PROCEDURE — 97110 THERAPEUTIC EXERCISES: CPT | Performed by: PHYSICAL THERAPIST

## 2017-10-11 PROCEDURE — 97140 MANUAL THERAPY 1/> REGIONS: CPT | Performed by: PHYSICAL THERAPIST

## 2017-10-11 PROCEDURE — 97016 VASOPNEUMATIC DEVICE THERAPY: CPT | Performed by: PHYSICAL THERAPIST

## 2017-10-11 NOTE — PROGRESS NOTES
PT DAILY TREATMENT NOTE 12    Patient Name: Zoltan Ledesma  Date:10/11/2017  : 1950  [x]  Patient  Verified  Payor: Anne Jansen / Plan: Amparo Arita HMO / Product Type: HMO /    In time:2:30  Out time:3:19  Total Treatment Time (min): 49  Visit #: 2 of 8    Treatment Area: Right knee pain [M25.561]    SUBJECTIVE  Pain Level (0-10 scale): 2  Any medication changes, allergies to medications, adverse drug reactions, diagnosis change, or new procedure performed?: [x] No    [] Yes (see summary sheet for update)  Subjective functional status/changes:   [] No changes reported  Feels okay. Had some chest pain today. OBJECTIVE    Modality rationale: decrease edema and decrease pain to improve the patients ability to complete ADLs   Min Type Additional Details   10 [x]  Vasopneumatic Device Pressure:       [x] lo [] med [] hi   Temperature: [x] lo [] med [] hi   [x] Skin assessment post-treatment:  [x]intact []redness- no adverse reaction    []redness - adverse reaction:     31 min Therapeutic Exercise:  [x] See flow sheet :   Rationale: increase ROM and increase strength to improve the patients ability to d    8 min Manual Therapy:  PROM   Rationale: increase ROM to complete ADLs       With   [] TE   [] TA   [] neuro   [] other: Patient Education: [x] Review HEP    [] Progressed/Changed HEP based on:   [] positioning   [] body mechanics   [] transfers   [] heat/ice application    [] other:        Pain Level (0-10 scale) post treatment: 3-4    ASSESSMENT/Changes in Function: Patient responds well to treatment session. Has minimal difficulty with exercises as prescribed. somewhat low tolerance to PROM.  No adverse effects were noted from today's treatment session      Patient will continue to benefit from skilled PT services to modify and progress therapeutic interventions, address functional mobility deficits, address ROM deficits, address strength deficits, analyze and address soft tissue restrictions, analyze and cue movement patterns, analyze and modify body mechanics/ergonomics and assess and modify postural abnormalities to attain remaining goals. []  See Plan of Care  []  See progress note/recertification  []  See Discharge Summary         Progress towards goals / Updated goals:  Short Term Goals: To be accomplished in 2 treatments:  1. Pt will demonstrate I and compliance with HEP to promote active role in rehabilitation. 2. Pt will improve R knee extension to <5 deg for improved gait efficiency. Long Term Goals: To be accomplished in 4 weeks:  1. Pt will improve FOTO score to 47 to demonstrate improved quality of life. 2. Pt will demonstrate R knee AROM flexion to 120 deg for improved ease of stair negotiation. 3. Pt will improve R knee and hip strength to 4/5 for improved stability with ADLs. 4. Pt will perform 10 sit to stands from table without UE use to improve ease of transfers in the home.     PLAN  []  Upgrade activities as tolerated     [x]  Continue plan of care  []  Update interventions per flow sheet       []  Discharge due to:_  []  Other:_      Sudhakar Franco PT, DPT 10/11/2017  2:41 PM    Future Appointments  Date Time Provider Brenden Padilla   10/17/2017 12:00 PM Buddy Castillo [facility]   10/18/2017 8:45 AM Lucy Valentin PA-C [facility]   10/19/2017 10:00 AM Sudhakar Franco PT [facility]   10/24/2017 12:00 PM Margareth Briones [facility]   10/26/2017 11:00 AM Margareth Briones [facility]   10/31/2017 12:30 PM Magda Swann [facility]   11/2/2017 11:30 AM Magda Swann [facility]   1/8/2018 2:40 PM Jamaica Jackson DO [address] [place] [address]

## 2017-10-17 ENCOUNTER — HOSPITAL ENCOUNTER (OUTPATIENT)
Dept: PHYSICAL THERAPY | Age: 67
Discharge: HOME | End: 2017-10-17
Payer: COMMERCIAL

## 2017-10-17 PROCEDURE — 97110 THERAPEUTIC EXERCISES: CPT

## 2017-10-17 PROCEDURE — 97140 MANUAL THERAPY 1/> REGIONS: CPT

## 2017-10-17 NOTE — PROGRESS NOTES
PT DAILY TREATMENT NOTE 3-16    Patient Name: Rosana Starkey  Date:10/17/2017  : 1950  [x]  Patient  Verified  Payor: Florina Cobian / Plan: [address] [place] [address] Rd PT / Product Type: Commerical /    In time:12:00  Out time:12:47  Total Treatment Time (min): 47  Visit #: 3 of 8    Treatment Area: Pain in right knee [M25.561]    SUBJECTIVE  Pain Level (0-10 scale): 2  Any medication changes, allergies to medications, adverse drug reactions, diagnosis change, or new procedure performed?: [x] No    [] Yes (see summary sheet for update)  Subjective functional status/changes:   [] No changes reported  \"I was really hurting after last time. I was crying. I hurt for like three days. \" Patient also reports that she stopped taking Oxycodone last week. Patient to see MD tomorrow, therapist encourages patient to talk to MD about pain medication as this is not in our scope of practice.      OBJECTIVE  Modality rationale: decrease edema, decrease inflammation and decrease pain to improve the patients ability to ease ADL tolerance   Min Type Additional Details    [] Estim:  []Unatt       []IFC  []Premod                        []Other:  []w/ice   []w/heat  Position:  Location:    [] Estim: []Att    []TENS instruct  []NMES                    []Other:  []w/US   []w/ice   []w/heat  Position:  Location:    []  Traction: [] Cervical       []Lumbar                       [] Prone          []Supine                       []Intermittent   []Continuous Lbs:  [] before manual  [] after manual    []  Ultrasound: []Continuous   [] Pulsed                           []1MHz   []3MHz Location:  W/cm2:    []  Iontophoresis with dexamethasone         Location: [] Take home patch   [] In clinic    []  Ice     []  heat  []  Ice massage  []  Laser   []  Anodyne Position:  Location:    []  Laser with stim  []  Other: Position:  Location:   10 [x]  Vasopneumatic Device Pressure:       [x] lo [] med [] hi   Temperature: [x] lo [] med [] hi   [x] Skin assessment post-treatment:  [x]intact []redness- no adverse reaction    []redness - adverse reaction:     29 min Therapeutic Exercise:  [x] See flow sheet :   Rationale: increase ROM, increase strength and improve coordination to improve the patients ability to increase ease with ADLs    8 min Manual Therapy:  Right patellar mobs, PROM right knee   Rationale: decrease pain, increase ROM and increase tissue extensibility to ease ADL tolerance           With   [] TE   [] TA   [] neuro   [] other: Patient Education: [x] Review HEP    [] Progressed/Changed HEP based on:   [] positioning   [] body mechanics   [] transfers   [] heat/ice application    [] other:      Other Objective/Functional Measures:   Decreased HR/TR range     Pain Level (0-10 scale) post treatment: 2    ASSESSMENT/Changes in Function:   Patient reports that her pain increased significantly after last session--patient's report of stopping Oxycodone last week most likely contributing. Patient is able to complete exercises per flowsheet and presents with no physical/verbal expressions of discomfort. She also tolerates manual well. Continue to focus on increasing knee knee strength for increased ease with prolonged ambulation. Patient will continue to benefit from skilled PT services to modify and progress therapeutic interventions, address functional mobility deficits, address ROM deficits, address strength deficits, analyze and address soft tissue restrictions, analyze and cue movement patterns, analyze and modify body mechanics/ergonomics and assess and modify postural abnormalities to attain remaining goals. []  See Plan of Care  []  See progress note/recertification  []  See Discharge Summary         Progress towards goals / Updated goals:  Short Term Goals: To be accomplished in 2 treatments:  1. Pt will demonstrate I and compliance with HEP to promote active role in rehabilitation.-  2.  Pt will improve R knee extension to <5 deg for improved gait efficiency. Long Term Goals: To be accomplished in 4 weeks:  1. Pt will improve FOTO score to 47 to demonstrate improved quality of life. 2. Pt will demonstrate R knee AROM flexion to 120 deg for improved ease of stair negotiation. 3. Pt will improve R knee and hip strength to 4/5 for improved stability with ADLs. 4. Pt will perform 10 sit to stands from table without UE use to improve ease of transfers in the home.     PLAN  []  Upgrade activities as tolerated     [x]  Continue plan of care  []  Update interventions per flow sheet       []  Discharge due to:_  []  Other:_      Hailey Navarro 10/17/2017  12:00 PM    Future Appointments  Date Time Provider Brenden Padilla   10/18/2017 8:45 AM NORRIS Clayton   10/19/2017 10:00 AM [facility]   10/24/2017 12:00 PM [facility]   10/26/2017 11:00 AM [facility]cretia [facility]   10/31/2017 12:30 PM [facility]   11/2/2017 11:30 AM [id number] [facility]   1/8/2018 2:40 PM [address]  [facility]

## 2017-10-18 ENCOUNTER — OFFICE VISIT (OUTPATIENT)
Dept: ORTHOPEDIC SURGERY | Facility: CLINIC | Age: 67
End: 2017-10-18

## 2017-10-18 VITALS
OXYGEN SATURATION: 97 % | SYSTOLIC BLOOD PRESSURE: 132 MMHG | DIASTOLIC BLOOD PRESSURE: 64 MMHG | RESPIRATION RATE: 20 BRPM | BODY MASS INDEX: 35.31 KG/M2 | WEIGHT: 225 LBS | HEIGHT: 67 IN | HEART RATE: 68 BPM

## 2017-10-18 DIAGNOSIS — Z96.651 STATUS POST TOTAL RIGHT KNEE REPLACEMENT: Primary | ICD-10-CM

## 2017-10-18 NOTE — PROGRESS NOTES
[address] [place] [address]  [phone]           Patient: Saba Aguirre                MRN: [id number]       SSN: [id number]  YOB: 1950        AGE: 74 y.o. SEX: female  Body mass index is 35.24 kg/(m^2). PCP: Myah Singh MD  10/18/17      This office note has been dictated. REVIEW OF SYSTEMS:  Constitutional: Negative for fever, chills, weight loss and malaise/fatigue. HENT: Negative. Eyes: Negative. Respiratory: Negative. Cardiovascular: Negative. Gastrointestinal: No bowel incontinence or constipation. Genitourinary: No bladder incontinence or saddle anesthesia. Skin: Negative. Neurological: Negative. Endo/Heme/Allergies: Negative. Psychiatric/Behavioral: Negative. Musculoskeletal: As per HPI above. Past Medical History:   Diagnosis Date    Breast cyst 1994    benign, removed    Deviated septum 04/2010    septoplasty    Diabetes mellitus     Type 2    GERD (gastroesophageal reflux disease)     severe    Hypertension     Hypothyroidism     Other elective surgery July 2008    toe surgery    Pure hypercholesterolemia     Stress thallium 09/30/2009    No evidence of ischemia or infarction; EF 78%; EKG portion negative with exercise capacity below average for age at 6 min of exercise    Varicose veins     closure in right leg 2008 & left leg 2007    Venous reflux study 06/24/2014    No DVT. [facility] occlusions of bilateral GSV. Current Outpatient Prescriptions:     tamsulosin (FLOMAX) 0.4 mg capsule, Take 1 Cap by mouth daily. , Disp: 30 Cap, Rfl: 3    celecoxib (CELEBREX) 200 mg capsule, Take 1 Cap by mouth two (2) times a day for 90 days. , Disp: 60 Cap, Rfl: 2    ferrous sulfate 325 mg (65 mg iron) tablet, Take 1 Tab by mouth two (2) times daily (with meals). , Disp: 60 Tab, Rfl: 2    aspirin (ASPIRIN) 325 mg tablet, Take 1 Tab by mouth daily. , Disp: 30 Tab, Rfl: 0    cetirizine (ZYRTEC) 10 mg tablet, Take 10 mg by mouth daily. , Disp: , Rfl:     metoprolol succinate (TOPROL-XL) 100 mg tablet, TAKE ONE TABLET BY MOUTH DAILY, Disp: 30 Tab, Rfl: 6    losartan (COZAAR) 100 mg tablet, Take 100 mg by mouth daily. , Disp: , Rfl:     escitalopram oxalate (LEXAPRO) 10 mg tablet, Take 10 mg by mouth daily. , Disp: , Rfl:     pantoprazole (PROTONIX) 40 mg tablet, Take 40 mg by mouth two (2) times a day., Disp: , Rfl:     metformin (GLUCOPHAGE) 500 mg tablet, Take 500 mg by mouth daily (with breakfast). , Disp: , Rfl:     levothyroxine (LEVOXYL) 88 mcg tablet, Take 88 mcg by mouth daily. , Disp: , Rfl:     lorazepam (ATIVAN) 1 mg tablet, Take 0.5 mg by mouth two (2) times a day., Disp: , Rfl:     ERGOCALCIFEROL, VITAMIN D2, (VITAMIN D PO), Take 50,000 Units by mouth every Monday., Disp: , Rfl:     docusate sodium (COLACE) 100 mg capsule, Take 100 mg by mouth two (2) times a day., Disp: , Rfl:     oxyCODONE-acetaminophen (PERCOCET 7.5) 7.5-325 mg per tablet, Take 1 Tab by mouth every four (4) hours as needed. Max Daily Amount: 6 Tabs. (Patient not taking: Reported on 10/18/2017), Disp: 64 Tab, Rfl: 0    Allergies   Allergen Reactions    Pcn [Penicillins] Unknown (comments)       Social History     Social History    Marital status:      Spouse name: N/A    Number of children: N/A    Years of education: N/A     Occupational History    Not on file. Social History Main Topics    Smoking status: Never Smoker    Smokeless tobacco: Never Used    Alcohol use No    Drug use: No    Sexual activity: Not on file     Other Topics Concern    Not on file     Social History Narrative       Past Surgical History:   Procedure Laterality Date    CYSTOTOMY,EXCIS BLADDER TUMOR  2006    HX HYSTERECTOMY      HX PARTIAL HYSTERECTOMY  1994    CA EGD DELIVER THERMAL ENERGY SPHNCTR/CARDIA GERD      THYROIDECTOMY  1992            We did see Ms. eLal for followup with regards to her right knee replacement. The patient is now four weeks status post surgery, and she is doing extremely well. She is working very hard on her own, as well as with physical therapy. She is taking no pain medicine. She has had no troubles with the wound and no fevers, chills, systemic changes, and no injuries to report and no chest pain or shortness of breath. PHYSICAL EXAMINATION:  In general, the patient is alert and oriented x 3 in no acute distress. The patient is well-developed, well-nourished, with a normal affect. The patient is afebrile. Examination of the right knee reveals the skin is intact. The surgical wounds are healing nicely. There is no erythema, ecchymosis, no warmth. There is minimal swelling and no signs of infection or cellulitis present. Range of motion is full. There is excellent stability. The patella tracks nicely. There are no rubs or crepitus noted. She does have about 2° extensor lag and no defects noted to the extensor mechanism. ASSESSMENT:   Status post right knee replacement. PLAN:  At this point, the patient is doing extremely well. She will continue with her home exercise program.  She will continue with outpatient physical therapy. She does need a little more quadriceps strengthening. She will followup with us in about three weeks time for evaluation, range of motion check, and x-rays. The patient, currently, will remain out of work. We will revisit this upon the next visit.                  JR Teofilo GOVEA PA-C, ATC

## 2017-10-19 ENCOUNTER — HOSPITAL ENCOUNTER (OUTPATIENT)
Dept: PHYSICAL THERAPY | Age: 67
Discharge: HOME | End: 2017-10-19
Payer: COMMERCIAL

## 2017-10-19 PROCEDURE — 97016 VASOPNEUMATIC DEVICE THERAPY: CPT | Performed by: PHYSICAL THERAPIST

## 2017-10-19 PROCEDURE — 97110 THERAPEUTIC EXERCISES: CPT | Performed by: PHYSICAL THERAPIST

## 2017-10-19 PROCEDURE — 97140 MANUAL THERAPY 1/> REGIONS: CPT | Performed by: PHYSICAL THERAPIST

## 2017-10-19 NOTE — PROGRESS NOTES
PT DAILY TREATMENT NOTE 12    Patient Name: Brittany Yoder  Date:10/19/2017  : 1950  [x]  Patient  Verified  Payor: Jeremías Burrows / [place] [employer]  [facility] / Product Type: Commerical /    In time:10:05  Out time:10:58  Total Treatment Time (min): 53  Visit #: 4 of 8    Treatment Area: Pain in right knee [M25.561]    SUBJECTIVE  Pain Level (0-10 scale): 1  Any medication changes, allergies to medications, adverse drug reactions, diagnosis change, or new procedure performed?: [x] No    [] Yes (see summary sheet for update)  Subjective functional status/changes:   [] No changes reported  Feels good. No new issues. Had her steri-strips taken off. OBJECTIVE    Modality rationale: decrease edema and decrease pain to improve the patients ability to complete ADLs   Min Type Additional Details   10 [x]  Vasopneumatic Device Pressure:       [x] lo [] med [] hi   Temperature: [x] lo [] med [] hi   [] Skin assessment post-treatment:  []intact []redness- no adverse reaction    []redness - adverse reaction:     35 min Therapeutic Exercise:  [x] See flow sheet :   Rationale: increase ROM, increase strength and decrease pain to improve the patients ability to complete ADLs    8 min Manual Therapy:  PROM   Rationale: decrease pain and increase ROM to complete ADLs              With   [] TE   [] TA   [] neuro   [] other: Patient Education: [x] Review HEP    [] Progressed/Changed HEP based on:   [] positioning   [] body mechanics   [] transfers   [] heat/ice application    [] other:      Other Objective/Functional Measures: 5-117     Pain Level (0-10 scale) post treatment: 1    ASSESSMENT/Changes in Function: Patient responds well to treatment session. ROM is improving. Will continue to need work into ext. Minimal difficulty with exercises as prescribed.  No adverse effects were noted from today's treatment session      Patient will continue to benefit from skilled PT services to modify and progress therapeutic interventions, address functional mobility deficits, address ROM deficits, address strength deficits, analyze and address soft tissue restrictions, analyze and cue movement patterns, analyze and modify body mechanics/ergonomics and assess and modify postural abnormalities to attain remaining goals. []  See Plan of Care  []  See progress note/recertification  []  See Discharge Summary         Progress towards goals / Updated goals:  Short Term Goals: To be accomplished in 2 treatments:  1. Pt will demonstrate I and compliance with HEP to promote active role in rehabilitation.-  2. Pt will improve R knee extension to <5 deg for improved gait efficiency. Long Term Goals: To be accomplished in 4 weeks:  1. Pt will improve FOTO score to 47 to demonstrate improved quality of life. 2. Pt will demonstrate R knee AROM flexion to 120 deg for improved ease of stair negotiation. 3. Pt will improve R knee and hip strength to 4/5 for improved stability with ADLs. 4. Pt will perform 10 sit to stands from table without UE use to improve ease of transfers in the home.     PLAN  []  Upgrade activities as tolerated     [x]  Continue plan of care  []  Update interventions per flow sheet       []  Discharge due to:_  []  Other:_      Ashley Peterson, PT, DPT 10/19/2017  10:11 AM    Future Appointments  Date Time Provider Brenden Padilla   10/24/2017 12:00 PM [facility]   10/26/2017 11:00 AM Yanirall Ache [facility]   10/31/2017 12:30 PM Megan Card [facility]   11[facility]2/2017 11:30 [facility]   11/8/2017 11:00 AM Willard Ramires MD [facility] YESSY SCHED   1/8/2018 2:40 PM Dirk Stout DO [address] [place] [address]

## 2017-10-20 ENCOUNTER — TELEPHONE (OUTPATIENT)
Dept: ORTHOPEDIC SURGERY | Age: 67
End: 2017-10-20

## 2017-10-20 NOTE — TELEPHONE ENCOUNTER
Pt called in asking if she needsto keep taking the Celebrex or is it ok to stop taking medication? Pt also states that the aspirin she has been taking runs out today. She has a pill for today but states that refills require authorization. Pt asked if she was on any blood thinners as well. Please advise pt @ [phone].

## 2017-10-23 RX ORDER — METOPROLOL SUCCINATE 100 MG/1
TABLET, EXTENDED RELEASE ORAL
Qty: 30 TAB | Refills: 11 | Status: SHIPPED | OUTPATIENT
Start: 2017-10-23 | End: 2018-10-15 | Stop reason: SDUPTHER

## 2017-10-24 ENCOUNTER — HOSPITAL ENCOUNTER (OUTPATIENT)
Dept: PHYSICAL THERAPY | Age: 67
Discharge: HOME | End: 2017-10-24
Payer: COMMERCIAL

## 2017-10-24 PROCEDURE — 97110 THERAPEUTIC EXERCISES: CPT

## 2017-10-24 PROCEDURE — 97140 MANUAL THERAPY 1/> REGIONS: CPT

## 2017-10-24 NOTE — PROGRESS NOTES
PT DAILY TREATMENT NOTE 3-16    Patient Name: Rebecca Kelley  Date:10/24/2017  : 1950  [x]  Patient  Verified  Payor: Kristy Blackmon / [place]: [place] [employer]  [facility] PT / Product Type: Commerical /    In time:12:00  Out time:12:42  Total Treatment Time (min): 42  Visit #: 5 of 8    Treatment Area: Pain in right knee [M25.561]    SUBJECTIVE  Pain Level (0-10 scale): 5  Any medication changes, allergies to medications, adverse drug reactions, diagnosis change, or new procedure performed?: [x] No    [] Yes (see summary sheet for update)  Subjective functional status/changes:   [] No changes reported  Patient reports that her knee has been swollen and her pain has increased secondary to manual last visit.      OBJECTIVE  Modality rationale: decrease edema, decrease inflammation and decrease pain to improve the patients ability to ease ADL tolerance   Min Type Additional Details    [] Estim:  []Unatt       []IFC  []Premod                        []Other:  []w/ice   []w/heat  Position:  Location:    [] Estim: []Att    []TENS instruct  []NMES                    []Other:  []w/US   []w/ice   []w/heat  Position:  Location:    []  Traction: [] Cervical       []Lumbar                       [] Prone          []Supine                       []Intermittent   []Continuous Lbs:  [] before manual  [] after manual    []  Ultrasound: []Continuous   [] Pulsed                           []1MHz   []3MHz Location:  W/cm2:    []  Iontophoresis with dexamethasone         Location: [] Take home patch   [] In clinic    []  Ice     []  heat  []  Ice massage  []  Laser   []  Anodyne Position:  Location:    []  Laser with stim  []  Other: Position:  Location:   10 [x]  Vasopneumatic Device Pressure:       [x] lo [] med [] hi   Temperature: [x] lo [] med [] hi   [x] Skin assessment post-treatment:  [x]intact []redness- no adverse reaction    []redness - adverse reaction:     24 min Therapeutic Exercise:  [x] See flow sheet :   Rationale: increase ROM, increase strength and improve coordination to improve the patients ability to increase ease with ADLs    8 min Manual Therapy:  Gentle right patellar mobs, PROM right knee   Rationale: decrease pain, increase ROM and increase tissue extensibility to ease ADL tolerance    With   [] TE   [] TA   [] neuro   [] other: Patient Education: [x] Review HEP    [] Progressed/Changed HEP based on:   [] positioning   [] body mechanics   [] transfers   [] heat/ice application    [] other:      Other Objective/Functional Measures:   Patient with increased edema right knee>left    Quad lag with SLR     Pain Level (0-10 scale) post treatment: 4    ASSESSMENT/Changes in Function:   Patient reports that her elevated pain and noted increased edema is due to last visit, specifically manual interventions. Patient is willing to participate in therapy today and tolerates gentle manual well. Held exercises per flowsheet with focus on pain control today. Patient will continue to benefit from skilled PT services to modify and progress therapeutic interventions, address functional mobility deficits, address ROM deficits, address strength deficits, analyze and address soft tissue restrictions, analyze and cue movement patterns, analyze and modify body mechanics/ergonomics, assess and modify postural abnormalities and instruct in home and community integration to attain remaining goals. []  See Plan of Care  []  See progress note/recertification  []  See Discharge Summary         Progress towards goals / Updated goals:  Short Term Goals: To be accomplished in 2 treatments:  1. Pt will demonstrate I and compliance with HEP to promote active role in rehabilitation. -reports semi-compliance (10/24/2017)  2. Pt will improve R knee extension to <5 deg for improved gait efficiency. Long Term Goals: To be accomplished in 4 weeks:  1. Pt will improve FOTO score to 47 to demonstrate improved quality of life.   2. Pt will demonstrate R knee AROM flexion to 120 deg for improved ease of stair negotiation. 3. Pt will improve R knee and hip strength to 4/5 for improved stability with ADLs. 4. Pt will perform 10 sit to stands from table without UE use to improve ease of transfers in the home.     PLAN  []  Upgrade activities as tolerated     [x]  Continue plan of care  []  Update interventions per flow sheet       []  Discharge due to:_  []  Other:_      Ora Vargas 10/24/2017  11:57 AM    Future Appointments  Date Time Provider Brenden Padilla   10/24/2017 12:00 PM [facility]   10/26/2017 11:00 AM [place] [facility]   10/31/2017 12:30 PM Prashanth Payment [facility]   11/2/2017 11:30 AM Prashanth Payment [facility]   11/8/2017 11:00 AM Faustino Klein MD [facility] YESSY SCHED   1/8/2018 2:40 PM Bambi Huffman DO [address] [place] [address]

## 2017-10-26 ENCOUNTER — DOCUMENTATION ONLY (OUTPATIENT)
Dept: ORTHOPEDIC SURGERY | Age: 67
End: 2017-10-26

## 2017-10-26 ENCOUNTER — HOSPITAL ENCOUNTER (OUTPATIENT)
Dept: PHYSICAL THERAPY | Age: 67
Discharge: HOME | End: 2017-10-26
Payer: COMMERCIAL

## 2017-10-26 PROCEDURE — 97116 GAIT TRAINING THERAPY: CPT

## 2017-10-26 PROCEDURE — 97140 MANUAL THERAPY 1/> REGIONS: CPT

## 2017-10-26 PROCEDURE — 97110 THERAPEUTIC EXERCISES: CPT

## 2017-10-26 NOTE — PROGRESS NOTES
Patient dropped off short term disability paperwork at the [facility] location. Please call for  when ready.

## 2017-10-26 NOTE — PROGRESS NOTES
PT DAILY TREATMENT NOTE 3-16    Patient Name: Rosana Starkey  Date:10/26/2017  : 1950  [x]  Patient  Verified  Payor: Tracy Angélica / Plan: [place] MED[place]ARE PART A / Product Type: Medicare /    In time:11:00  Out time:11:53  Total Treatment Time (min): 53  Visit #: 6 of 8    Treatment Area: Pain in right knee [M25.561]    SUBJECTIVE  Pain Level (0-10 scale): 2-3/10  Any medication changes, allergies to medications, adverse drug reactions, diagnosis change, or new procedure performed?: [x] No    [] Yes (see summary sheet for update)  Subjective functional status/changes:   [] No changes reported  \"I'm feeling much better than I did on Tuesday. \"    OBJECTIVE  Modality rationale: decrease edema, decrease inflammation and decrease pain to improve the patients ability to ease ADL tolerance   Min Type Additional Details    [] Estim:  []Unatt       []IFC  []Premod                        []Other:  []w/ice   []w/heat  Position:  Location:    [] Estim: []Att    []TENS instruct  []NMES                    []Other:  []w/US   []w/ice   []w/heat  Position:  Location:    []  Traction: [] Cervical       []Lumbar                       [] Prone          []Supine                       []Intermittent   []Continuous Lbs:  [] before manual  [] after manual    []  Ultrasound: []Continuous   [] Pulsed                           []1MHz   []3MHz Location:  W/cm2:    []  Iontophoresis with dexamethasone         Location: [] Take home patch   [] In clinic    []  Ice     []  heat  []  Ice massage  []  Laser   []  Anodyne Position:  Location:    []  Laser with stim  []  Other: Position:  Location:   10 [x]  Vasopneumatic Device Pressure:       [] lo [] med [] hi   Temperature: [] lo [] med [] hi   [] Skin assessment post-treatment:  []intact []redness- no adverse reaction    []redness - adverse reaction:     27 min Therapeutic Exercise:  [x] See flow sheet :   Rationale: increase ROM, increase strength and improve coordination to improve the patients ability to increase ease with ADLs    8 min Manual Therapy:  Right patellar mobs, PROM right knee   Rationale: decrease pain, increase ROM and increase tissue extensibility to ease ADL tolerance    8 min Gait Trainin feet with SPC device on level surfaces with supervision   Rationale: to improve gait quality and ease with prolonged ambulation               With   [] TE   [] TA   [] neuro   [] other: Patient Education: [x] Review HEP    [] Progressed/Changed HEP based on:   [] positioning   [] body mechanics   [] transfers   [] heat/ice application    [] other:      Other Objective/Functional Measures:   FOTO score: 48    Right knee extension: lacking five degrees    Cues to prevent extensor lag with SLR   Cues symmetrical weight bear with sit<->stands    Difficulty with step downs    Pain Level (0-10 scale) post treatment: 1-2/10    ASSESSMENT/Changes in Function:   Patient making some progress towards initial goals, while ROM is improving, she continues with quad weakness. Patient better able to tolerate session today and presents with decreased edema (R) knee as compared to last visit. Continues with good tolerance to manual. Will continue to focus on increasing right knee strength for increased ease with prolonged ambulation. Patient will continue to benefit from skilled PT services to modify and progress therapeutic interventions, address functional mobility deficits, address ROM deficits, address strength deficits, analyze and address soft tissue restrictions, analyze and cue movement patterns, analyze and modify body mechanics/ergonomics and assess and modify postural abnormalities to attain remaining goals. []  See Plan of Care  []  See progress note/recertification  []  See Discharge Summary         Progress towards goals / Updated goals:  Short Term Goals: To be accomplished in 2 treatments:  1.  Pt will demonstrate I and compliance with HEP to promote active role in rehabilitation. -reports semi-compliance (10/24/2017)  2. Pt will improve R knee extension to <5 deg for improved gait efficiency. -progressing, currently lacking five degrees (10/26/2017)  Long Term Goals: To be accomplished in 4 weeks:  1. Pt will improve FOTO score to 47 to demonstrate improved quality of life.-met, score to 48 (10/26/2017)  2. Pt will demonstrate R knee AROM flexion to 120 deg for improved ease of stair negotiation. 3. Pt will improve R knee and hip strength to 4/5 for improved stability with ADLs.   4. Pt will perform 10 sit to stands from table without UE use to improve ease of transfers in the home.-progressing, able to perform without UE assist, however, presents with asymmetrical weight bear (10/26/2017)    PLAN  []  Upgrade activities as tolerated     [x]  Continue plan of care  []  Update interventions per flow sheet       []  Discharge due to:_  []  Other:_      Ora Vargas 10/26/2017  11:01 AM    Future Appointments  Date Time Provider Brenden Padilla   10/31/2017 12:30 [facility]   11/2/2017 11:30 AM Prashanth Payment [facility]   11/8/2017 11:00 AM Faustino Klein MD [facility] YESSY SCHED   1/8/2018 2:40 PM Bambi Huffman DO [address] [place] [address]

## 2017-10-30 ENCOUNTER — DOCUMENTATION ONLY (OUTPATIENT)
Dept: ORTHOPEDIC SURGERY | Age: 67
End: 2017-10-30

## 2017-10-31 ENCOUNTER — HOSPITAL ENCOUNTER (OUTPATIENT)
Dept: PHYSICAL THERAPY | Age: 67
Discharge: HOME | End: 2017-10-31
Payer: COMMERCIAL

## 2017-10-31 PROCEDURE — 97110 THERAPEUTIC EXERCISES: CPT

## 2017-10-31 PROCEDURE — 97112 NEUROMUSCULAR REEDUCATION: CPT

## 2017-10-31 PROCEDURE — 97140 MANUAL THERAPY 1/> REGIONS: CPT

## 2017-11-02 ENCOUNTER — HOSPITAL ENCOUNTER (OUTPATIENT)
Dept: PHYSICAL THERAPY | Age: 67
Discharge: HOME | End: 2017-11-02
Payer: COMMERCIAL

## 2017-11-02 PROCEDURE — 97110 THERAPEUTIC EXERCISES: CPT

## 2017-11-02 PROCEDURE — 97112 NEUROMUSCULAR REEDUCATION: CPT

## 2017-11-02 PROCEDURE — 97140 MANUAL THERAPY 1/> REGIONS: CPT

## 2017-11-02 NOTE — PROGRESS NOTES
PT DAILY TREATMENT NOTE 3-16    Patient Name: Rebecca Kelley  Date:2017  : 1950  [x]  Patient  Verified  Payor: Kristy Blackmon / Plan: [employer] / Product Type: HMO /    In time:11:40  Out time:12:27  Total Treatment Time (min): 47  Visit #: 8 of 8    Treatment Area: Pain in right knee [M25.561]    SUBJECTIVE  Pain Level (0-10 scale): 1-2/10  Any medication changes, allergies to medications, adverse drug reactions, diagnosis change, or new procedure performed?: [x] No    [] Yes (see summary sheet for update)  Subjective functional status/changes:   [] No changes reported  \"The knee is stiff. My left knee is almost as bad as my right knee. \"    OBJECTIVE  Modality rationale: decrease edema and decrease pain to improve the patients ability to ease ADL tolerance   Min Type Additional Details    [] Estim:  []Unatt       []IFC  []Premod                        []Other:  []w/ice   []w/heat  Position:  Location:    [] Estim: []Att    []TENS instruct  []NMES                    []Other:  []w/US   []w/ice   []w/heat  Position:  Location:    []  Traction: [] Cervical       []Lumbar                       [] Prone          []Supine                       []Intermittent   []Continuous Lbs:  [] before manual  [] after manual    []  Ultrasound: []Continuous   [] Pulsed                           []1MHz   []3MHz Location:  W/cm2:    []  Iontophoresis with dexamethasone         Location: [] Take home patch   [] In clinic    []  Ice     []  heat  []  Ice massage  []  Laser   []  Anodyne Position:  Location:    []  Laser with stim  []  Other: Position:  Location:   10 [x]  Vasopneumatic Device Pressure:       [x] lo [] med [] hi   Temperature: [x] lo [] med [] hi   [x] Skin assessment post-treatment:  [x]intact []redness- no adverse reaction    []redness - adverse reaction:     19 min Therapeutic Exercise:  [x] See flow sheet :   Rationale: increase ROM, increase strength and improve coordination to improve the patients ability to increase ease with ADLs    10 min Neuromuscular Re-education:  [x]  See flow sheet : dynamic gait to include: retro ambulation and high marches without AD   Rationale: increase strength, improve coordination, improve balance and increase proprioception  to improve the patients ability to improve stability with gait     8 min Manual Therapy:  PROM right knee, grade II tibiofemoral mobs to increase extension, right patellar mobs   Rationale: decrease pain, increase ROM and increase tissue extensibility to improve gait quality           With   [] TE   [] TA   [] neuro   [] other: Patient Education: [x] Review HEP    [] Progressed/Changed HEP based on:   [] positioning   [] body mechanics   [] transfers   [] heat/ice application    [] other:      Other Objective/Functional Measures:   Right knee: flex: 4/5. Ext: 4-/5    Right hip grossly: 4-/5     Pain Level (0-10 scale) post treatment: 0-1/10    ASSESSMENT/Changes in Function:   Patient is making good progress towards initial goals, ROM and strength are improving. Patient continues with some hip abductor weakness as evidenced with Trendelenburg gait when ambulating without SPC. Patient would benefit from continued skilled PT 2x3 weeks to progress with last few degrees of knee extension and increase right knee/hip strength for increased ease with prolonged ambulation and LRAD. Patient will continue to benefit from skilled PT services to modify and progress therapeutic interventions, address functional mobility deficits, address ROM deficits, analyze and cue movement patterns, analyze and modify body mechanics/ergonomics, assess and modify postural abnormalities and instruct in home and community integration to attain remaining goals. []  See Plan of Care  [x]  See progress note/recertification  []  See Discharge Summary             Progress towards goals / Updated goals:  Short Term Goals: To be accomplished in 2 treatments:  1.  Pt will demonstrate I and compliance with HEP to promote active role in rehabilitation. -reports semi-compliance (10/24/2017)  2. Pt will improve R knee extension to <5 deg for improved gait efficiency. -progressing, currently lacking five degrees (10/26/2017); Met lacking 2 deg 10/31/17  Long Term Goals: To be accomplished in 4 weeks:  1. Pt will improve FOTO score to 47 to demonstrate improved quality of life.-met, score to 48 (10/26/2017)  2. Pt will demonstrate R knee AROM flexion to 120 deg for improved ease of stair negotiation. Near met 119 deg 10/31/17  3. Pt will improve R knee and hip strength to 4/5 for improved stability with ADLs-progressing, R knee flex: 4/5. Ext: 4-/5. Right hip grossly 4-/5 (11/2/2017)  4.  Pt will perform 10 sit to stands from table without UE use to improve ease of transfers in the home.-progressing, able to perform without UE assist, however, presents with asymmetrical weight bear (10/26/2017); improved symmetry 10/31/17    PLAN  [x]  Upgrade activities as tolerated     [x]  Continue plan of care  []  Update interventions per flow sheet       []  Discharge due to:_  []  Other:_      Ora Vargas 11/2/2017  11:40 AM    Future Appointments  Date Time Provider Brenden Padilla   11/8/2017 11:00 AM Faustino Klein MD [facility]ESS[facility] SCHED   1/8/2018 2:40 PM Bambi Huffman DO [address] [place] [address]

## 2017-11-07 ENCOUNTER — HOSPITAL ENCOUNTER (OUTPATIENT)
Dept: PHYSICAL THERAPY | Age: 67
Discharge: HOME | End: 2017-11-07
Payer: COMMERCIAL

## 2017-11-07 PROCEDURE — 97140 MANUAL THERAPY 1/> REGIONS: CPT

## 2017-11-07 PROCEDURE — 97016 VASOPNEUMATIC DEVICE THERAPY: CPT

## 2017-11-07 PROCEDURE — 97110 THERAPEUTIC EXERCISES: CPT

## 2017-11-07 NOTE — PROGRESS NOTES
PT DAILY TREATMENT NOTE 3-16    Patient Name: Kiran Jett  Date:2017  : 1950  [x]  Patient  Verified  Payor: Ramsey Duvall / Plan: [employer]  CAPITATED PT / Product Type: Commerical /    In FJPJ:[id number]  Out KJRM:5018  Total Treatment Time (min): 40  Visit #: 1 of 8    Treatment Area: Pain in right knee [M25.561]    SUBJECTIVE  Pain Level (0-10 scale): 0-1/10  Any medication changes, allergies to medications, adverse drug reactions, diagnosis change, or new procedure performed?: [x] No    [] Yes (see summary sheet for update)  Subjective functional status/changes:   [] No changes reported  Pt reports she feels like she is gradually getting better.      OBJECTIVE  Modality rationale: decrease edema and decrease pain to improve the patients ability to ease ADL tolerance   Min Type Additional Details    [] Estim:  []Unatt       []IFC  []Premod                        []Other:  []w/ice   []w/heat  Position:  Location:    [] Estim: []Att    []TENS instruct  []NMES                    []Other:  []w/US   []w/ice   []w/heat  Position:  Location:    []  Traction: [] Cervical       []Lumbar                       [] Prone          []Supine                       []Intermittent   []Continuous Lbs:  [] before manual  [] after manual    []  Ultrasound: []Continuous   [] Pulsed                           []1MHz   []3MHz Location:  W/cm2:    []  Iontophoresis with dexamethasone         Location: [] Take home patch   [] In clinic    []  Ice     []  heat  []  Ice massage  []  Laser   []  Anodyne Position:  Location:    []  Laser with stim  []  Other: Position:  Location:   10 [x]  Vasopneumatic Device Pressure:       [x] lo [] med [] hi   Temperature: [x] lo [] med [] hi   [x] Skin assessment post-treatment:  [x]intact []redness- no adverse reaction    []redness - adverse reaction:     22 min Therapeutic Exercise:  [x] See flow sheet :   Rationale: increase ROM, increase strength and improve coordination to improve the patients ability to increase ease with ADLs    8 min Manual Therapy:  PROM right knee, grade II tibiofemoral mobs for flexion and extension, right patellar mobs   Rationale: decrease pain, increase ROM and increase tissue extensibility to improve gait quality           With   [] TE   [] TA   [] neuro   [] other: Patient Education: [x] Review HEP    [] Progressed/Changed HEP based on:   [] positioning   [] body mechanics   [] transfers   [] heat/ice application    [] other:      Other Objective/Functional Measures: increased reps with sit to stands    Pain Level (0-10 scale) post treatment: 0-1/10    ASSESSMENT/Changes in Function: pt continues to progress well with PT, ROM and strength improving. Gait with gradual improvement with use of cane. Patient will continue to benefit from skilled PT services to modify and progress therapeutic interventions, address functional mobility deficits, address ROM deficits, analyze and cue movement patterns, analyze and modify body mechanics/ergonomics, assess and modify postural abnormalities and instruct in home and community integration to attain remaining goals. []  See Plan of Care  []  See progress note/recertification  []  See Discharge Summary             Progress towards goals / Updated goals:  Short Term Goals: To be accomplished in 2 treatments:  1. Pt will demonstrate I and compliance with HEP to promote active role in rehabilitation. -reports semi-compliance (10/24/2017)  2. Pt will improve R knee extension to <5 deg for improved gait efficiency. -progressing, currently lacking five degrees (10/26/2017); Met lacking 2 deg 10/31/17  Long Term Goals: To be accomplished in 4 weeks:  1. Pt will improve FOTO score to 47 to demonstrate improved quality of life.-met, score to 48 (10/26/2017)  2. Pt will demonstrate R knee AROM flexion to 120 deg for improved ease of stair negotiation. Near met 119 deg 10/31/17  3.  Pt will improve R knee and hip strength to 4/5 for improved stability with ADLs-progressing, R knee flex: 4/5. Ext: 4-/5. Right hip grossly 4-/5 (11/2/2017)  4.  Pt will perform 10 sit to stands from table without UE use to improve ease of transfers in the home.-progressing, able to perform without UE assist, however, presents with asymmetrical weight bear (10/26/2017); improved symmetry 10/31/17    PLAN  [x]  Upgrade activities as tolerated     []  Continue plan of care  []  Update interventions per flow sheet       []  Discharge due to:_  []  Other:_      Rodrick Lane PT 11/7/2017  7:40 AM    Future Appointments  Date Time Provider Brenden Padilla   11/8/2017 11:00 AM Ben Edwards MD [facility]   11/9/2017 8:00 AM Julia Houston PTA [facility]   11/14/2017 7:30 AM Rodrick Lane PT [facility]   11/16/2017 9:30 AM Jarett Banerjee PTA [facility]   11/21/2017 9:30 AM Kelly Hart [facility]   1/8/2018 2:40 PM Brenden Arellano, DO [address] [place] [address]

## 2017-11-08 NOTE — PROGRESS NOTES
[facility]  [address]  [place], [address]  [phone] fax    Physical Therapy Progress Note  Patient name: Pérez Dave Start of Care: 10/9/2017   Referral source: Mary Isaiaskiera : 1950                         Medical Diagnosis: Presence of right artificial knee joint [Z96.651] Onset Date:2017                         Treatment Diagnosis: R TKA   Prior Hospitalization: see medical history Provider#: [id number]   Medications: Verified on Patient summary List    Comorbidities: heart dz, depression, diabetes, arthritis, thyroid problems, HTN   Prior Level of Function: Pt ambulated with SPC prior to surgery  Visits from Start of Care: 8    Missed Visits: 0      Established Goals:        Excellent         Good         Limited            None  [x] Increased ROM   []  [x]  []  []  [x] Increased Strength  []  []  [x]  []  [x] Increased Mobility  []  []  [x]  []   [] Decreased Pain   []  []  []  []  [] Decreased Swelling  []  []  []  []    Key Functional Changes: slow progression of knee mobility and strength  Short Term Goals: To be accomplished in 2 treatments:  1. Pt will demonstrate I and compliance with HEP to promote active role in rehabilitation. -reports semi-compliance (10/24/2017)  2. Pt will improve R knee extension to <5 deg for improved gait efficiency. -progressing, currently lacking five degrees (10/26/2017); Met lacking 2 deg 10/31/17  Long Term Goals: To be accomplished in 4 weeks:  1. Pt will improve FOTO score to 47 to demonstrate improved quality of life.-met, score to 48 (10/26/2017)  2. Pt will demonstrate R knee AROM flexion to 120 deg for improved ease of stair negotiation. Near met 119 deg 10/31/17  3. Pt will improve R knee and hip strength to 4/5 for improved stability with ADLs-progressing, R knee flex: 4/5. Ext: 4-/5. Right hip grossly 4-/5 (2017)  4.  Pt will perform 10 sit to stands from table without UE use to improve ease of transfers in the home.-progressing, able to perform without UE assist, however, presents with asymmetrical weight bear (10/26/2017); improved symmetry 10/31/17    Updated Goals: to be achieved in 4 weeks:  1. Pt will improve R knee and hip strength to 4/5 for improved stability with ADLs  2. Pt will perform 10 sit to stands from chair without UE use to improve ease of transfers in the home. 3. Pt will ambulate void of antalgia or instability without AD for improved gait efficiency and independence. ASSESSMENT/RECOMMENDATIONS: Pt shows improved knee mobility at this time. Still some limitations in strength and functional task efficiency. Pt would benefit from further PT to improve functional mobility and stability. [x]Continue therapy per initial plan/protocol at a frequency of  2 x per week for 4 weeks  []Continue therapy with the following recommended changes:_____________________      _____________________________________________________________________  []Discontinue therapy progressing towards or have reached established goals  []Discontinue therapy due to lack of appreciable progress towards goals  []Discontinue therapy due to lack of attendance or compliance  []Await Physician's recommendations/decisions regarding therapy  []Other:________________________________________________________________    Thank you for this referral.    Mercedes Elliott DPT, CMTPT 11/8/2017 8:42 AM  NOTE TO PHYSICIAN:  PLEASE COMPLETE THE ORDERS BELOW AND   FAX TO [facility]: [phone]  If you are unable to process this request in 24 hours please contact our office: [phone]    ? I have read the above report and request that my patient continue as recommended. ? I have read the above report and request that my patient continue therapy with the following changes/special instructions:__________________________________________________________  ?  I have read the above report and request that my patient be discharged from therapy.     Physicians signature: ______________________________Date: ______Time:______

## 2017-11-09 ENCOUNTER — APPOINTMENT (OUTPATIENT)
Dept: PHYSICAL THERAPY | Age: 67
End: 2017-11-09
Payer: COMMERCIAL

## 2017-11-14 ENCOUNTER — HOSPITAL ENCOUNTER (OUTPATIENT)
Dept: PHYSICAL THERAPY | Age: 67
Discharge: HOME | End: 2017-11-14
Payer: COMMERCIAL

## 2017-11-14 PROCEDURE — 97016 VASOPNEUMATIC DEVICE THERAPY: CPT

## 2017-11-14 PROCEDURE — 97140 MANUAL THERAPY 1/> REGIONS: CPT

## 2017-11-14 PROCEDURE — 97110 THERAPEUTIC EXERCISES: CPT

## 2017-11-14 NOTE — PROGRESS NOTES
PT DAILY TREATMENT NOTE 3-16    Patient Name: Kishan Platt  Date:2017  : 1950  [x]  Patient  Verified  Payor: Unknown Ades / Plan: [employer]  [employer] PT / Product Type: Commerical /    In time:740  Out time:828  Total Treatment Time (min): 48  Visit #: 2 of 8    Treatment Area: Pain in right knee [M25.561]    SUBJECTIVE  Pain Level (0-10 scale): 4/10  Any medication changes, allergies to medications, adverse drug reactions, diagnosis change, or new procedure performed?: [x] No    [] Yes (see summary sheet for update)  Subjective functional status/changes:   [] No changes reported  Pt reports she started having increased pain in the R knee last couple of days. She reports going to MD tomorrow.       OBJECTIVE  Modality rationale: decrease edema and decrease pain to improve the patients ability to ease ADL tolerance   Min Type Additional Details    [] Estim:  []Unatt       []IFC  []Premod                        []Other:  []w/ice   []w/heat  Position:  Location:    [] Estim: []Att    []TENS instruct  []NMES                    []Other:  []w/US   []w/ice   []w/heat  Position:  Location:    []  Traction: [] Cervical       []Lumbar                       [] Prone          []Supine                       []Intermittent   []Continuous Lbs:  [] before manual  [] after manual    []  Ultrasound: []Continuous   [] Pulsed                           []1MHz   []3MHz Location:  W/cm2:    []  Iontophoresis with dexamethasone         Location: [] Take home patch   [] In clinic    []  Ice     []  heat  []  Ice massage  []  Laser   []  Anodyne Position:  Location:    []  Laser with stim  []  Other: Position:  Location:   10 [x]  Vasopneumatic Device Pressure:       [x] lo [] med [] hi   Temperature: [x] lo [] med [] hi   [x] Skin assessment post-treatment:  [x]intact []redness- no adverse reaction    []redness - adverse reaction:     30 min Therapeutic Exercise:  [x] See flow sheet :   Rationale: increase ROM, increase strength and improve coordination to improve the patients ability to increase ease with ADLs    8 min Manual Therapy:  PROM right knee, grade III tibiofemoral mobs for flexion and extension, right patellar mobs   Rationale: decrease pain, increase ROM and increase tissue extensibility to improve gait quality           With   [] TE   [] TA   [] neuro   [] other: Patient Education: [x] Review HEP    [] Progressed/Changed HEP based on:   [] positioning   [] body mechanics   [] transfers   [] heat/ice application    [] other:      Other Objective/Functional Measures: AAROM flexion to 120 degrees. Pain Level (0-10 scale) post treatment: 2/10    ASSESSMENT/Changes in Function: pt with gradual improvement with PT. R knee flexion AAROM is now 120 degrees. + effusion noted with patellar mobs. Patient will continue to benefit from skilled PT services to modify and progress therapeutic interventions, address functional mobility deficits, address ROM deficits, analyze and cue movement patterns, analyze and modify body mechanics/ergonomics, assess and modify postural abnormalities and instruct in home and community integration to attain remaining goals. []  See Plan of Care  []  See progress note/recertification  []  See Discharge Summary             Progress towards goals / Updated goals:  Short Term Goals: To be accomplished in 2 treatments:  1. Pt will demonstrate I and compliance with HEP to promote active role in rehabilitation. -reports semi-compliance (10/24/2017)  2. Pt will improve R knee extension to <5 deg for improved gait efficiency. -progressing, currently lacking five degrees (10/26/2017); Met lacking 2 deg 10/31/17  Long Term Goals: To be accomplished in 4 weeks:  1. Pt will improve FOTO score to 47 to demonstrate improved quality of life.-met, score to 48 (10/26/2017)  2. Pt will demonstrate R knee AROM flexion to 120 deg for improved ease of stair negotiation.  Near met 119 deg 10/31/17, AAROM to 120 degrees 11-14-17  3. Pt will improve R knee and hip strength to 4/5 for improved stability with ADLs-progressing, R knee flex: 4/5. Ext: 4-/5. Right hip grossly 4-/5 (11/2/2017)  4.  Pt will perform 10 sit to stands from table without UE use to improve ease of transfers in the home.-progressing, able to perform without UE assist, however, presents with asymmetrical weight bear (10/26/2017); improved symmetry 10/31/17    PLAN  [x]  Upgrade activities as tolerated     []  Continue plan of care  []  Update interventions per flow sheet       []  Discharge due to:_  []  Other:_      Arley Gonzales, PT 11/14/2017  7:48 AM    Future Appointments  Date Time Provider Brenden Padilla   11/15/2017 8:45 AM Tully Severin, PA-C [address]   11/16/2017 9:30 AM Dee Bella PTA [facility]   11/21/2017 9:30 AM Mu Duke [facility]   1/8/2018 2:40 PM Magnolia Dent DO [address] [place] [address]

## 2017-11-15 ENCOUNTER — OFFICE VISIT (OUTPATIENT)
Dept: ORTHOPEDIC SURGERY | Facility: CLINIC | Age: 67
End: 2017-11-15

## 2017-11-15 VITALS
BODY MASS INDEX: 35.19 KG/M2 | OXYGEN SATURATION: 96 % | SYSTOLIC BLOOD PRESSURE: 145 MMHG | HEIGHT: 67 IN | TEMPERATURE: 96.2 F | DIASTOLIC BLOOD PRESSURE: 77 MMHG | RESPIRATION RATE: 15 BRPM | HEART RATE: 81 BPM | WEIGHT: 224.2 LBS

## 2017-11-15 DIAGNOSIS — M25.561 RIGHT KNEE PAIN, UNSPECIFIED CHRONICITY: Primary | ICD-10-CM

## 2017-11-15 NOTE — PROGRESS NOTES
[address]  [phone]           Patient: Toan Peña                MRN: [id number]       SSN: [id number]  YOB: 1950        AGE: 74 y.o. SEX: female  Body mass index is 35.11 kg/(m^2). PCP: Marisela Fischer MD  11/15/17      This office note has been dictated. REVIEW OF SYSTEMS:  Constitutional: Negative for fever, chills, weight loss and malaise/fatigue. HENT: Negative. Eyes: Negative. Respiratory: Negative. Cardiovascular: Negative. Gastrointestinal: No bowel incontinence or constipation. Genitourinary: No bladder incontinence or saddle anesthesia. Skin: Negative. Neurological: Negative. Endo/Heme/Allergies: Negative. Psychiatric/Behavioral: Negative. Musculoskeletal: As per HPI above. Past Medical History:   Diagnosis Date    Breast cyst 1994    benign, removed    Deviated septum 04/2010    septoplasty    Diabetes mellitus     Type 2    GERD (gastroesophageal reflux disease)     severe    Hypertension     Hypothyroidism     Other elective surgery July 2008    toe surgery    Pure hypercholesterolemia     Stress thallium 09/30/2009    No evidence of ischemia or infarction; EF 78%; EKG portion negative with exercise capacity below average for age at 6 min of exercise    Varicose veins     closure in right leg 2008 & left leg 2007    Venous reflux study 06/24/2014    No DVT. [facility] occlusions of bilateral GSV. Current Outpatient Prescriptions:     metoprolol succinate (TOPROL-XL) 100 mg tablet, TAKE ONE TABLET BY MOUTH DAILY, Disp: 30 Tab, Rfl: 11    cetirizine (ZYRTEC) 10 mg tablet, Take 10 mg by mouth daily. , Disp: , Rfl:     escitalopram oxalate (LEXAPRO) 10 mg tablet, Take 10 mg by mouth daily. , Disp: , Rfl:     pantoprazole (PROTONIX) 40 mg tablet, Take 40 mg by mouth two (2) times a day., Disp: , Rfl:     metformin (GLUCOPHAGE) 500 mg tablet, Take 500 mg by mouth daily (with breakfast). , Disp: , Rfl:     levothyroxine (LEVOXYL) 88 mcg tablet, Take 88 mcg by mouth daily. , Disp: , Rfl:     lorazepam (ATIVAN) 1 mg tablet, Take 0.5 mg by mouth two (2) times a day., Disp: , Rfl:     ERGOCALCIFEROL, VITAMIN D2, (VITAMIN D PO), Take 50,000 Units by mouth every Monday., Disp: , Rfl:     docusate sodium (COLACE) 100 mg capsule, Take 100 mg by mouth two (2) times a day., Disp: , Rfl:     oxyCODONE-acetaminophen (PERCOCET 7.5) 7.5-325 mg per tablet, Take 1 Tab by mouth every four (4) hours as needed. Max Daily Amount: 6 Tabs. (Patient not taking: Reported on 10/18/2017), Disp: 56 Tab, Rfl: 0    tamsulosin (FLOMAX) 0.4 mg capsule, Take 1 Cap by mouth daily. , Disp: 30 Cap, Rfl: 3    celecoxib (CELEBREX) 200 mg capsule, Take 1 Cap by mouth two (2) times a day for 90 days. , Disp: 60 Cap, Rfl: 2    ferrous sulfate 325 mg (65 mg iron) tablet, Take 1 Tab by mouth two (2) times daily (with meals). , Disp: 60 Tab, Rfl: 2    aspirin (ASPIRIN) 325 mg tablet, Take 1 Tab by mouth daily. , Disp: 30 Tab, Rfl: 0    losartan (COZAAR) 100 mg tablet, Take 100 mg by mouth daily. , Disp: , Rfl:     Allergies   Allergen Reactions    Pcn [Penicillins] Unknown (comments)       Social History     Social History    Marital status:      Spouse name: N/A    Number of children: N/A    Years of education: N/A     Occupational History    Not on file.      Social History Main Topics    Smoking status: Never Smoker    Smokeless tobacco: Never Used    Alcohol use No    Drug use: No    Sexual activity: Not on file     Other Topics Concern    Not on file     Social History Narrative       Past Surgical History:   Procedure Laterality Date    CYSTOTOMY,EXCIS BLADDER TUMOR  2006    HX HYSTERECTOMY      HX KNEE REPLACEMENT Right     HX PARTIAL HYSTERECTOMY  1994    IN EGD DELIVER THERMAL ENERGY SPHNCTR/CARDIA GERD      THYROIDECTOMY  1992            We did see Ms. Elvis for followup with regards to her right knee replacement. The patient is now eight weeks status post surgery and doing quite well. She is quite happy with the results of the knee replacement. She has worked very hard in physical therapy, as well as on her own. She has had no troubles with the wound and no fevers, chills, systemic changes, and no injuries to report. She did discontinue her narcotics. She is taking Tylenol every so often. The pain is well controlled. She does have a little bit of swelling and discomfort at the end of the day if she has been very active. She has had no night sweats, no fevers, and no chills. PHYSICAL EXAMINATION:  In general, the patient is alert and oriented x 3 in no acute distress. The patient is well-developed, well-nourished, with a normal affect. The patient is afebrile. Examination of the right knee reveals the skin is intact. The surgical wounds are healed nicely. She has no ecchymosis, no warmth, and no signs of infection or cellulitis present. Range of motion is full. She has excellent stability. The patella tracks nicely. There are no rubs or crepitus noted. RADIOGRAPHS:  Radiographs in the office today, including AP, lateral, and skyline of the right knee, show total knee components are well-fixed. No evidence of loosening or fracture is noted. ASSESSMENT:  Status post right knee replacement. PLAN:  At this point, the patient has done extremely well with her knee replacement. She will continue with her home exercise program, as well as finish up her outpatient physical therapy. Likely, she will be heading back to work over the next couple of weeks. We will provide her a note as she would like. She will call with any questions or concerns that shall arise. I will see her back in four weeks time.                    JR Teofilo GOVEA, NORRIS, ATC

## 2017-11-16 ENCOUNTER — HOSPITAL ENCOUNTER (OUTPATIENT)
Dept: PHYSICAL THERAPY | Age: 67
Discharge: HOME | End: 2017-11-16
Payer: COMMERCIAL

## 2017-11-16 PROCEDURE — 97140 MANUAL THERAPY 1/> REGIONS: CPT

## 2017-11-16 PROCEDURE — 97016 VASOPNEUMATIC DEVICE THERAPY: CPT

## 2017-11-16 PROCEDURE — 97110 THERAPEUTIC EXERCISES: CPT

## 2017-11-16 NOTE — PROGRESS NOTES
PT DAILY TREATMENT NOTE     Patient Name: Laverne Petty  Date:2017  : 1950  [x]  Patient  Verified  Payor: Paulina Valladares / Plan: [place] [employer]  CAPITATED PT / Product Type: Commerical /    In time:9:34  Out time:10:30  Total Treatment Time (min): 56  Visit #: 3 of 8    Treatment Area: Pain in right knee [M25.561]    SUBJECTIVE  Pain Level (0-10 scale): 2/10  Any medication changes, allergies to medications, adverse drug reactions, diagnosis change, or new procedure performed?: [x] No    [] Yes (see summary sheet for update)  Subjective functional status/changes:   [] No changes reported  \"More soreness. \"    OBJECTIVE    Modality rationale: decrease inflammation and decrease pain to improve the patients ability to perform ADL's.    Min Type Additional Details    [] Estim:  []Unatt       []IFC  []Premod                        []Other:  []w/ice   []w/heat  Position:  Location:    [] Estim: []Att    []TENS instruct  []NMES                    []Other:  []w/US   []w/ice   []w/heat  Position:  Location:    []  Traction: [] Cervical       []Lumbar                       [] Prone          []Supine                       []Intermittent   []Continuous Lbs:  [] before manual  [] after manual    []  Ultrasound: []Continuous   [] Pulsed                           []1MHz   []3MHz W/cm2:  Location:    []  Iontophoresis with dexamethasone         Location: [] Take home patch   [] In clinic    []  Ice     []  heat  []  Ice massage  []  Laser   []  Anodyne Position:  Location:    []  Laser with stim  []  Other:  Position:  Location:   10 [x]  Vasopneumatic Device Pressure:       [x] lo [] med [] hi   Temperature: [x] lo [] med [] hi   [] Skin assessment post-treatment:  []intact []redness- no adverse reaction    []redness - adverse reaction:     38 min Therapeutic Exercise:  [x] See flow sheet :   Rationale: increase ROM, increase strength and increase proprioception to improve the patients ability to perform ADL's.    8 min Manual Therapy:  (R) Patella mobs, scar massage, popliteal release, knee PROM. Rationale: decrease pain, increase ROM, increase tissue extensibility and decrease trigger points to improve heel strike during gait. With   [x] TE   [] TA   [] neuro   [] other: Patient Education: [x] Review HEP    [] Progressed/Changed HEP based on:   [] positioning   [] body mechanics   [] transfers   [] heat/ice application    [] other:      Other Objective/Functional Measures: Lacking (R) TKE. Pt reports very limited HEP compliance, educated the importance of performing HEP regularly. Pain Level (0-10 scale) post treatment: 1/10    ASSESSMENT/Changes in Function: Requires at least 1 UE to perform sit to stand from chair, fair eccentric control sitting down. Patient will continue to benefit from skilled PT services to modify and progress therapeutic interventions, address functional mobility deficits, address ROM deficits, address strength deficits, analyze and address soft tissue restrictions and analyze and cue movement patterns to attain remaining goals. [x]  See Plan of Care  []  See progress note/recertification  []  See Discharge Summary         Progress towards goals / Updated goals:  Short Term Goals: To be accomplished in 2 treatments:  1. Pt will demonstrate I and compliance with HEP to promote active role in rehabilitation. -reports semi-compliance (10/24/2017)  2. Pt will improve R knee extension to <5 deg for improved gait efficiency. -progressing, currently lacking five degrees (10/26/2017); Met lacking 2 deg 10/31/17  Long Term Goals: To be accomplished in 4 weeks:  1. Pt will improve FOTO score to 47 to demonstrate improved quality of life.-met, score to 48 (10/26/2017)  2. Pt will demonstrate R knee AROM flexion to 120 deg for improved ease of stair negotiation. Near met 119 deg 10/31/17, AAROM to 120 degrees 11-14-17  3.  Pt will improve R knee and hip strength to 4/5 for improved stability with ADLs-progressing, R knee flex: 4/5. Ext: 4-/5. Right hip grossly 4-/5 (11/2/2017)  4.  Pt will perform 10 sit to stands from table without UE use to improve ease of transfers in the home.-progressing, able to perform without UE assist, however, presents with asymmetrical weight bear (10/26/2017); improved symmetry 10/31/17    PLAN  []  Upgrade activities as tolerated     [x]  Continue plan of care  []  Update interventions per flow sheet       []  Discharge due to:_  []  Other:_      Candelario Acuna, MOE 11/16/2017  9:38 AM    Future Appointments  Date Time Provider Brenden Padilla   11/21/2017 9:30 AM Sophia Interiano [id number] [facility]   12/13/2017 8:45 AM Lucy Valentin PA-C [facility] YESSY SCHED   1/8/2018 2:40 PM Jamaica Jackson DO [address] [place] [address] Avenue

## 2017-11-20 ENCOUNTER — HOSPITAL ENCOUNTER (OUTPATIENT)
Dept: PHYSICAL THERAPY | Age: 67
Discharge: HOME | End: 2017-11-20
Payer: COMMERCIAL

## 2017-11-20 ENCOUNTER — TELEPHONE (OUTPATIENT)
Dept: ORTHOPEDIC SURGERY | Age: 67
End: 2017-11-20

## 2017-11-20 PROCEDURE — 97112 NEUROMUSCULAR REEDUCATION: CPT

## 2017-11-20 PROCEDURE — 97110 THERAPEUTIC EXERCISES: CPT

## 2017-11-20 PROCEDURE — 97140 MANUAL THERAPY 1/> REGIONS: CPT

## 2017-11-20 NOTE — LETTER
NOTIFICATION RETURN TO WORK 
 
11/21/2017 11:15 AM 
 
Ms. Dorothy Mancilla [address] [facility] [id number] To Whom It May Concern: 
 
Dorothy Mancilla is currently under the care of [address] [place] [address] Joseph Raman. Please allow the patient to return to work on Monday, 11/27/2017. If there are questions or concerns please have the patient contact our office. Sincerely, Ying Mariscal MD

## 2017-11-20 NOTE — PROGRESS NOTES
PT DAILY TREATMENT NOTE     Patient Name: Sravan Bautista  Date:2017  : 1950  [x]  Patient  Verified  Payor: [place] MEDICARE / Plan: [place] MEDICARE PART A / Product Type: Medicare /    In time:10:34  Out time:11:20  Total Treatment Time (min): 55  Visit #: 4 of 8    Treatment Area: Pain in right knee [M25.561]    SUBJECTIVE  Pain Level (0-10 scale): 3/10  Any medication changes, allergies to medications, adverse drug reactions, diagnosis change, or new procedure performed?: [x] No    [] Yes (see summary sheet for update)  Subjective functional status/changes:   [] No changes reported  Pt denies any new complaints of pain in (R) knee. Pt c/o pain in (L) knee. OBJECTIVE    Modality rationale: decrease inflammation and decrease pain to improve the patients ability to tolerate ADLs.     Min Type Additional Details    [] Estim:  []Unatt       []IFC  []Premod                        []Other:  []w/ice   []w/heat  Position:  Location:    [] Estim: []Att    []TENS instruct  []NMES                    []Other:  []w/US   []w/ice   []w/heat  Position:  Location:    []  Traction: [] Cervical       []Lumbar                       [] Prone          []Supine                       []Intermittent   []Continuous Lbs:  [] before manual  [] after manual    []  Ultrasound: []Continuous   [] Pulsed                           []1MHz   []3MHz W/cm2:  Location:    []  Iontophoresis with dexamethasone         Location: [] Take home patch   [] In clinic    []  Ice     []  heat  []  Ice massage  []  Laser   []  Anodyne Position:  Location:    []  Laser with stim  []  Other:  Position:  Location:   10 []  Vasopneumatic Device Pressure:       [] lo [] med [] hi   Temperature: [] lo [] med [] hi   [] Skin assessment post-treatment:  []intact []redness- no adverse reaction    []redness - adverse reaction:     18 min Therapeutic Exercise:  [x] See flow sheet :   Rationale: increase ROM and increase strength to improve the patients ability to tolerate ADLs. 10 min Neuromuscular Re-education:  [x]  See flow sheet :   Rationale: increase strength, improve coordination and increase proprioception  to improve the patients ability to perform functional activities. 8 min Manual Therapy:  PROM (R) knee, patellar mobs   Rationale: increase ROM and increase tissue extensibility to improve tolerance to functional activities. With   [] TE   [] TA   [] neuro   [] other: Patient Education: [x] Review HEP    [] Progressed/Changed HEP based on:   [] positioning   [] body mechanics   [] transfers   [] heat/ice application    [] other:      Other Objective/Functional Measures: No LOB when performing dynamic gait activities. Pain Level (0-10 scale) post treatment: 1/10    ASSESSMENT/Changes in Function: Pt demonstrates fair control when performing strengthening exercises demonstrating frequent fatigue and weakness. Patient will continue to benefit from skilled PT services to modify and progress therapeutic interventions, address functional mobility deficits, address ROM deficits, address strength deficits, analyze and address soft tissue restrictions, analyze and cue movement patterns and analyze and modify body mechanics/ergonomics to attain remaining goals. []  See Plan of Care  []  See progress note/recertification  []  See Discharge Summary         Progress towards goals / Updated goals:  Short Term Goals: To be accomplished in 2 treatments:  1. Pt will demonstrate I and compliance with HEP to promote active role in rehabilitation. -reports semi-compliance (10/24/2017)  2. Pt will improve R knee extension to <5 deg for improved gait efficiency. -progressing, currently lacking five degrees (10/26/2017); Met lacking 2 deg 10/31/17  Long Term Goals: To be accomplished in 4 weeks:  1. Pt will improve FOTO score to 47 to demonstrate improved quality of life.-met, score to 48 (10/26/2017)  2.  Pt will demonstrate R knee AROM flexion to 120 deg for improved ease of stair negotiation. Near met 119 deg 10/31/17, AAROM to 120 degrees 11-14-17  3. Pt will improve R knee and hip strength to 4/5 for improved stability with ADLs-progressing, R knee flex: 4/5. Ext: 4-/5. Right hip grossly 4-/5 (11/2/2017)  4.  Pt will perform 10 sit to stands from table without UE use to improve ease of transfers in the home.-progressing, able to perform without UE assist, however, presents with asymmetrical weight bear (10/26/2017); improved symmetry 10/31/17    PLAN  []  Upgrade activities as tolerated     []  Continue plan of care  []  Update interventions per flow sheet       []  Discharge due to:_  []  Other:_      Roberto Dodson PTA 11/20/2017  11:15 AM    Future Appointments  Date Time Provider Brenden Padilla   11/21/2017 9:30 AM Hailey Navarro [facility]   11/27/2017 4:30 PM Roberto Dodson PTA [facility]   11/29/2017 5:00 PM Eloise Villegas PTA [facility]   12/5/2017 5:00 PM [id number] [facility]   12/13/2017 8:45 AM Chris Ayala PA-C [facility] YESSY SCHED   1/8/2018 2:40 PM [address]  [facility]

## 2017-11-20 NOTE — TELEPHONE ENCOUNTER
Pt called stating that she needs a letter stating that she would like to return to work. Pt states that she would like to return to work after Thanksgiving. Pt would like the note to give a 1 week notice for a return to work date for [email or web address]. Pt can be reached at [phone].

## 2017-11-21 ENCOUNTER — HOSPITAL ENCOUNTER (OUTPATIENT)
Dept: PHYSICAL THERAPY | Age: 67
Discharge: HOME | End: 2017-11-21
Payer: COMMERCIAL

## 2017-11-21 PROCEDURE — 97140 MANUAL THERAPY 1/> REGIONS: CPT

## 2017-11-21 PROCEDURE — 97112 NEUROMUSCULAR REEDUCATION: CPT

## 2017-11-21 PROCEDURE — 97110 THERAPEUTIC EXERCISES: CPT

## 2017-11-21 NOTE — TELEPHONE ENCOUNTER
Work note written and patient notified she can pick it up at the [facility] office.

## 2017-11-21 NOTE — PROGRESS NOTES
PT DAILY TREATMENT NOTE 3-16    Patient Name: Marshal Harmon  Date:2017  : 1950  [x]  Patient  Verified  Payor: [place] MEDICARE / Plan: [place] MEDICARE PART A / Product Type: Medicare /    In time:9:35  Out time:10:27  Total Treatment Time (min): 52  Visit #: 5 of 8    Treatment Area: Pain in right knee [M25.561]    SUBJECTIVE  Pain Level (0-10 scale): 3  Any medication changes, allergies to medications, adverse drug reactions, diagnosis change, or new procedure performed?: [x] No    [] Yes (see summary sheet for update)  Subjective functional status/changes:   [] No changes reported  \"We held on the bike last time since my heart rate was up. \" Prior to session, patient's heart rate is 79 bpm today. Patient reports that she called ortho for clearance to return to work; she is currently waiting for him to call her back.      OBJECTIVE  Modality rationale: decrease inflammation and decrease pain to improve the patients ability to ease ADL tolerance   Min Type Additional Details    [] Estim:  []Unatt       []IFC  []Premod                        []Other:  []w/ice   []w/heat  Position:  Location:    [] Estim: []Att    []TENS instruct  []NMES                    []Other:  []w/US   []w/ice   []w/heat  Position:  Location:    []  Traction: [] Cervical       []Lumbar                       [] Prone          []Supine                       []Intermittent   []Continuous Lbs:  [] before manual  [] after manual    []  Ultrasound: []Continuous   [] Pulsed                           []1MHz   []3MHz Location:  W/cm2:    []  Iontophoresis with dexamethasone         Location: [] Take home patch   [] In clinic    []  Ice     []  heat  []  Ice massage  []  Laser   []  Anodyne Position:  Location:    []  Laser with stim  []  Other: Position:  Location:   10 [x]  Vasopneumatic Device Pressure:       [x] lo [] med [] hi   Temperature: [x] lo [] med [] hi   [] Skin assessment post-treatment:  []intact []redness- no adverse reaction    []redness - adverse reaction:     26 min Therapeutic Exercise:  [x] See flow sheet :   Rationale: increase ROM, increase strength and improve coordination to improve the patients ability to increase ease with ADLs    8 min Neuromuscular Re-education:  [x]  See flow sheet : dynamic gait per flowsheet   Rationale: increase strength, improve coordination, improve balance and increase proprioception  to improve the patients ability to increase stability with ADLs     8 min Manual Therapy:  PROM right knee, right patellar mobs   Rationale: decrease pain, increase ROM and increase tissue extensibility to ease ADL tolerance           With   [] TE   [] TA   [] neuro   [] other: Patient Education: [x] Review HEP    [] Progressed/Changed HEP based on:   [] positioning   [] body mechanics   [] transfers   [] heat/ice application    [] other:      Other Objective/Functional Measures:   Prior to session: 79 bpm     Right knee flexion AROM: 123 degrees    Pain Level (0-10 scale) post treatment: 2    ASSESSMENT/Changes in Function:   Patient with no instability with danymic gait void of AD; however, she continues to utilize [facility] with community ambulation not for balance but rather due to pain in left knee secondary to reports of arthritis (patient carries SPC in right hand). Flexion ROM has increased since last progress note. Patient will continue to benefit from skilled PT services to modify and progress therapeutic interventions, address functional mobility deficits, address ROM deficits, address strength deficits, analyze and address soft tissue restrictions, analyze and cue movement patterns, analyze and modify body mechanics/ergonomics, assess and modify postural abnormalities and instruct in home and community integration to attain remaining goals.      []  See Plan of Care  []  See progress note/recertification  []  See Discharge Summary         Progress towards goals / Updated goals:  Short Term Goals: To be accomplished in 2 treatments:  1. Pt will demonstrate I and compliance with HEP to promote active role in rehabilitation. -reports semi-compliance (10/24/2017)  2. Pt will improve R knee extension to <5 deg for improved gait efficiency. -progressing, currently lacking five degrees (10/26/2017); Met lacking 2 deg 10/31/17  Long Term Goals: To be accomplished in 4 weeks:  1. Pt will improve FOTO score to 47 to demonstrate improved quality of life.-met, score to 48 (10/26/2017)  2. Pt will demonstrate R knee AROM flexion to 120 deg for improved ease of stair negotiation. Near met 119 deg 10/31/17, AAROM to 120 degrees 11-14-17 met, AROM flexion to 123 degrees (11/21/2017)  3. Pt will improve R knee and hip strength to 4/5 for improved stability with ADLs-progressing, R knee flex: 4/5. Ext: 4-/5. Right hip grossly 4-/5 (11/2/2017)  4.  Pt will perform 10 sit to stands from table without UE use to improve ease of transfers in the home.-progressing, able to perform without UE assist, however, presents with asymmetrical weight bear (10/26/2017); improved symmetry 10/31/17     PLAN  [x]  Upgrade activities as tolerated     [x]  Continue plan of care  []  Update interventions per flow sheet       []  Discharge due to:_  []  Other:_      Cosmo Scheuermann 11/21/2017  9:38 AM    Future Appointments  Date Time Provider Brenden Padilla   11/27/2017 4:30 PM Jessica Conway PTA [facility]   11/29/2017 5:00 PM Lisa Jensen PTA [facility]   12/5/2017 5:00 PM [id number] [facility]   12/13/2017 8:45 AM Pj Erwin PA-C [facility] YESSY SCHED   1/8/2018 2:40 PM Ciarra Chavarria  [facility]

## 2017-11-27 ENCOUNTER — DOCUMENTATION ONLY (OUTPATIENT)
Dept: ORTHOPEDIC SURGERY | Age: 67
End: 2017-11-27

## 2017-11-27 ENCOUNTER — TELEPHONE (OUTPATIENT)
Dept: ORTHOPEDIC SURGERY | Age: 67
End: 2017-11-27

## 2017-11-27 ENCOUNTER — APPOINTMENT (OUTPATIENT)
Dept: PHYSICAL THERAPY | Age: 67
End: 2017-11-27
Payer: COMMERCIAL

## 2017-11-27 NOTE — TELEPHONE ENCOUNTER
Patient states she believes she was told she can begin wearing her compression stockings again. She would like to confirm this. Please advise.

## 2017-11-29 ENCOUNTER — APPOINTMENT (OUTPATIENT)
Dept: PHYSICAL THERAPY | Age: 67
End: 2017-11-29
Payer: COMMERCIAL

## 2017-11-30 ENCOUNTER — DOCUMENTATION ONLY (OUTPATIENT)
Dept: ORTHOPEDIC SURGERY | Age: 67
End: 2017-11-30

## 2017-12-05 ENCOUNTER — APPOINTMENT (OUTPATIENT)
Dept: PHYSICAL THERAPY | Age: 67
End: 2017-12-05

## 2017-12-15 ENCOUNTER — TELEPHONE (OUTPATIENT)
Dept: ORTHOPEDIC SURGERY | Age: 67
End: 2017-12-15

## 2017-12-15 NOTE — TELEPHONE ENCOUNTER
Patient just started today having sharp stabbling pain in her rt knee. Patient stated she did not get call for appt of 12/13. She does not used home no.which is just a fax. Please call her back about the stabbing pain in her rt knee. At [phone].

## 2017-12-15 NOTE — TELEPHONE ENCOUNTER
Not uncommon to have sharp pains in knee at times as it continues to heal.    Come in for appt to evaluate.

## 2017-12-18 NOTE — TELEPHONE ENCOUNTER
Spoke with patient and info given.  She says that the pain has gotten better and will call and make an appointment if the pain comes back

## 2018-01-23 ENCOUNTER — OFFICE VISIT (OUTPATIENT)
Dept: CARDIOLOGY CLINIC | Age: 68
End: 2018-01-23

## 2018-01-23 VITALS
BODY MASS INDEX: 35.79 KG/M2 | SYSTOLIC BLOOD PRESSURE: 128 MMHG | DIASTOLIC BLOOD PRESSURE: 76 MMHG | OXYGEN SATURATION: 97 % | HEIGHT: 67 IN | WEIGHT: 228 LBS | HEART RATE: 97 BPM

## 2018-01-23 DIAGNOSIS — R07.89 OTHER CHEST PAIN: Primary | ICD-10-CM

## 2018-01-23 DIAGNOSIS — E78.5 DYSLIPIDEMIA: ICD-10-CM

## 2018-01-23 DIAGNOSIS — K21.9 GASTROESOPHAGEAL REFLUX DISEASE, ESOPHAGITIS PRESENCE NOT SPECIFIED: ICD-10-CM

## 2018-01-23 PROBLEM — E11.21 TYPE 2 DIABETES MELLITUS WITH NEPHROPATHY (HCC): Status: ACTIVE | Noted: 2018-01-23

## 2018-01-23 NOTE — PROGRESS NOTES
HPI:  I saw Berta Leal in my office today in cardiovascular evaluation regarding her past problems with palpitations and some atypical anginal chest discomfort. Ms. Dionne Brown is a pleasant, very anxious, 74year old  female with history of significant chest discomforts over the years, which have been felt to be related to gastroesophageal reflux disease and some to musculoskeletal issues. She did have a nuclear myocardial perfusion study for these atypical non anginal symptoms back in 2009, which was completely within normal limits.     She comes in the office today and relates that she is doing reasonably well. She does complain of some palpitations which occur 3 or 4 times a week but these are described as a skipping sensation which lasts for a minute or 2 she does have some chest discomfort from time to time sometimes associated with palpitations and sometimes not but these last for only 30 seconds and she is really not having any other cardiovascular complaints except for little lower extremity edema from time to time. Encounter Diagnoses   Name Primary?  Chest pain, musculoskeletal and non cardiac Yes    Dyslipidemia     Gastroesophageal reflux disease, esophagitis presence not specified        Discussion: This patient appears to be doing about as well as we could expect and really of no recommendations for change at this time. The chest discomforts which she is having really do not sound cardiac, but she does have a strong family history for coronary disease so I told her that if she starts developing problems with chest discomfort with exertion relieved by rest which lasts for a few minutes or she begins to have problems with dyspnea on exertion and fatigue she is to let us know and I would consider repeating her nuclear myocardial perfusion study. She historically has trouble taking statin drugs, but I do not know that water-based statins have been attempted on her.   She was last on Lipitor and that was stopped for myalgias but I think if her cholesterol significantly elevated with her multiple risk factors we probably should try to get her on a statin and I would consider using water-based statin such as pravastatin or Crestor but I will leave that to Dr. Irwin Congress will be checking her cholesterol in the near future. Her blood pressure is well-controlled today and she otherwise seems to be doing well and tells me that she had a right knee replaced back in September 2017 and is considering getting her left knee replaced sometime later this year which I think is reasonable on for which I think she would be relatively low risk. Since she otherwise is doing well I will plan to see her again in several months. PCP: Vivek Holm MD      Past Medical History:   Diagnosis Date    Breast cyst 1994    benign, removed    Deviated septum 04/2010    septoplasty    Diabetes mellitus     Type 2    GERD (gastroesophageal reflux disease)     severe    Hypertension     Hypothyroidism     Other elective surgery July 2008    toe surgery    Pure hypercholesterolemia     Stress thallium 09/30/2009    No evidence of ischemia or infarction; EF 78%; EKG portion negative with exercise capacity below average for age at 6 min of exercise    Varicose veins     closure in right leg 2008 & left leg 2007    Venous reflux study 06/24/2014    No DVT. [facility] occlusions of bilateral GSV. Past Surgical History:   Procedure Laterality Date    CYSTOTOMY,EXCIS BLADDER TUMOR  2006    HX HYSTERECTOMY      HX KNEE REPLACEMENT Right     HX PARTIAL HYSTERECTOMY  1994    NV EGD DELIVER THERMAL ENERGY SPHNCTR/CARDIA GERD      THYROIDECTOMY  1992         Current Outpatient Rx   Name  Route  Sig  Dispense  Refill    losartan (COZAAR) 100 mg tablet    Oral    Take 100 mg by mouth daily.  atorvastatin (LIPITOR) 10 mg tablet    Oral    Take 10 mg by mouth daily.               escitalopram oxalate (LEXAPRO) 10 mg tablet    Oral    Take 10 mg by mouth daily.  metoprolol succinate (TOPROL-XL) 100 mg XL tablet    Oral    Take 1 Tab by mouth daily. 30 Tab    6      pantoprazole (PROTONIX) 40 mg tablet    Oral    Take 40 mg by mouth two (2) times a day.  metformin (GLUCOPHAGE) 500 mg tablet    Oral    Take 500 mg by mouth daily (with breakfast).  levothyroxine (LEVOXYL) 88 mcg tablet    Oral    Take 88 mcg by mouth daily.  lorazepam (ATIVAN) 1 mg tablet    Oral    Take 0.5 mg by mouth two (2) times a day.  ERGOCALCIFEROL, VITAMIN D2, (VITAMIN D PO)    Oral    Take 50,000 Units by mouth every Monday. Allergies   Allergen Reactions    Pcn [Penicillins] Unknown (comments)         Social History:   Social History   Substance Use Topics    Smoking status: Never Smoker    Smokeless tobacco: Never Used    Alcohol use No         Family history: family history includes Heart Attack in her maternal grandmother; Hypertension in her mother. Review of Systems:    Constitutional: Positive for malaise/fatigue. Negative for chills, fever and weight loss. Respiratory: Negative for cough, hemoptysis, shortness of breath and wheezing. Cardiovascular: Positive for chest pain, palpitations and leg swelling. Negative for orthopnea. Gastrointestinal: Positive for heartburn. Negative for abdominal pain, blood in stool, constipation, diarrhea, melena, nausea and vomiting. Musculoskeletal: Positive for joint pain and myalgias. Negative for falls. Neurological: Negative for dizziness. Physical Exam:   The patient is an alert, oriented, well developed, well nourished 74 y.o.  female who was in no acute distress at the time of my examination.   Visit Vitals    /76    Pulse 97    Ht 5' 7\" (1.702 m)    Wt 103.4 kg (228 lb)    SpO2 97%    BMI 35.71 kg/m2      BP Readings from Last 3 Encounters: 01/23/18 128/76   11/15/17 145/77   10/18/17 132/64        Wt Readings from Last 3 Encounters:   01/23/18 103.4 kg (228 lb)   11/15/17 101.7 kg (224 lb 3.2 oz)   10/18/17 102.1 kg (225 lb)       HEENT: Conjunctivae white, mucosa moist, no pallor or cyanosis. Neck: Supple without masses, tenderness or thyromegaly. No jugular venous distention. Carotid upstrokes are full bilaterally, without bruits. Cardiovascular: Chest is symmetrical with good excursion. There is some mild tenderness to palpation over the fifth left costochondral junction parasternally. [place] is not displaced. No lifts, heaves or thrills. S1 and S2 are normal, with a soft grade I-II/VI FRANK[place]N along the LSB, with radiation to the base, without appreciable rubs, clicks or gallops. Lungs: Clear to auscultation in all fields. Abdomen: Soft. No masses or organomegaly. There is some epigastric tenderness. Extremities: No edema with full peripheral pulses. Review of Data: See PMH and Cardiology and Imaging sections for cardiac testing      Results for orders placed or performed in visit on 01/23/18   AMB POC EKG ROUTINE W/ 12 LEADS, INTER & REP     Status: None    Narrative    Normal sinus rhythm, rate 69. This EKG is within normal limits and similar to the EKG of September 18, 2017 except at that time there appear to be low voltage throughout and the current tracing seems to have more voltage and does not have clear-cut low voltage either in the limb leads or precordial leads. Lucien De D.O., F.A.C.C. [facility]  [facility] [place]  [facility] [address] [address]    PLEASE NOTE:  This document has been produced using voice recognition software. Unrecognized errors in transcription may be present.

## 2018-01-23 NOTE — MR AVS SNAPSHOT
[facility] [address] [id number] [address] [address] [phone] Patient: Caitlin Dunlap MRN: [id number] Visit Information Date & Time Provider Department Dept. Phone Encounter #  
 1/23/2018  8:40 AM Alphonso Gonzalez DO [facility] [id number] Your Appointments 1/26/2018  3:00 PM  
Follow Up with Guillermo Ford PA-C  
[facility] ([facility]) Appt Note: rt knee fu  
 [address], [address] [facility]  
[phone] [address] [place] [address], [facility] [address]  
  
    
 8/20/2018  8:20 AM  
Follow Up with lAphonso Gonzalez DO [facility] [facility] ([facility]) Appt Note: 6-8 month follow up [facility] [address] [address] [address] [phone] [address] [place] [address] [address] Upcoming Health Maintenance Date Due Hepatitis C Screening 1950 LIPID PANEL Q1 1950 FOOT EXAM Q1 9/22/1962 MICROALBUMIN Q1 9/22/1962 EYE EXAM RETINAL OR DILATED Q1 9/22/1962 DTaP/Tdap/Td series (1 - Tdap) 9/22/1973 FOBT Q 1 YEAR AGE 50-75 9/22/2002 ZOSTER VACCINE AGE 60> 7/22/2012 Influenza Age 5 to Adult 8/1/2017 GLAUCOMA SCREENING Q2Y 9/22/2017 OSTEOPOROSIS SCREENING (DEXA) 9/22/2017 Pneumococcal 65+ Low/Medium Risk (1 of 2 - PCV13) 9/22/2017 MEDICARE YEARLY EXAM 9/22/2017 HEMOGLOBIN A1C Q6M 3/18/2018 BREAST CANCER SCRN MAMMOGRAM 6/12/2019 Allergies as of 1/23/2018  Review Complete On: 1/23/2018 By: Alphonso Gonzalez DO Severity Noted Reaction Type Reactions Pcn [Penicillins]  09/09/2011    Unknown (comments) Current Immunizations  Never Reviewed No immunizations on file. Not reviewed this visit You Were Diagnosed With   
  
 Codes Comments Other chest pain    -  Primary ICD-10-CM: R07.89 ICD-9-CM: 786.59 Dyslipidemia     ICD-10-CM: E78.5 ICD-9-CM: 272.4 Gastroesophageal reflux disease, esophagitis presence not specified     ICD-10-CM: K21.9 ICD-9-CM: 530.81 Vitals BP Pulse Height(growth percentile) Weight(growth percentile) SpO2 BMI  
 128/76 97 5' 7\" (1.702 m) 228 lb (103.4 kg) 97% 35.71 kg/m2 OB Status Smoking Status Hysterectomy Never Smoker Vitals History BMI and BSA Data Body Mass Index Body Surface Area 35.71 kg/m 2 2.21 m 2 Preferred Pharmacy Pharmacy Name Phone [id number] Jewel Narvaez, [address] [facility] [facility] [phone] Your Updated Medication List  
  
   
This list is accurate as of: 1/23/18  9:38 AM.  Always use your most recent med list.  
  
  
  
  
 cetirizine 10 mg tablet Commonly known as:  ZYRTEC Take 10 mg by mouth daily. COZAAR 100 mg tablet Generic drug:  losartan Take 100 mg by mouth daily. GLUCOPHAGE 500 mg tablet Generic drug:  metFORMIN Take 500 mg by mouth daily (with breakfast). LEVOXYL 88 mcg tablet Generic drug:  levothyroxine Take 88 mcg by mouth daily. LEXAPRO 10 mg tablet Generic drug:  escitalopram oxalate Take 10 mg by mouth daily. LORazepam 1 mg tablet Commonly known as:  ATIVAN Take 0.5 mg by mouth two (2) times a day. metoprolol succinate 100 mg tablet Commonly known as:  TOPROL-XL  
TAKE ONE TABLET BY MOUTH DAILY pantoprazole 40 mg tablet Commonly known as:  PROTONIX Take 40 mg by mouth two (2) times a day. VITAMIN D2 PO Take 50,000 Units by mouth every Monday. We Performed the Following AMB POC EKG ROUTINE W/ 12 LEADS, INTER & REP [32092 CPT(R)] Patient Instructions If chest pain, angina lasting 2-3 min with exertion, need to call the office and let us know Please provide this summary of care documentation to your next provider. Your primary care clinician is listed as Sunday Sophia. If you have any questions after today's visit, please call [phone].

## 2018-01-23 NOTE — PROGRESS NOTES
1. Have you been to the ER, urgent care clinic since your last visit? Hospitalized since your last visit?no      2. Have you seen or consulted any other health care providers outside of the [address] [place] [address] since your last visit? Include any pap smears or colon screening.  no

## 2018-01-23 NOTE — PROGRESS NOTES
Review of Systems   Constitutional: Positive for malaise/fatigue. Negative for chills, fever and weight loss. Respiratory: Negative for cough, hemoptysis, shortness of breath and wheezing. Cardiovascular: Positive for chest pain, palpitations and leg swelling. Negative for orthopnea. Gastrointestinal: Positive for heartburn. Negative for abdominal pain, blood in stool, constipation, diarrhea, melena, nausea and vomiting. Musculoskeletal: Positive for joint pain and myalgias. Negative for falls. Neurological: Negative for dizziness.

## 2018-01-26 ENCOUNTER — OFFICE VISIT (OUTPATIENT)
Dept: ORTHOPEDIC SURGERY | Age: 68
End: 2018-01-26

## 2018-01-26 VITALS
WEIGHT: 229 LBS | RESPIRATION RATE: 16 BRPM | HEART RATE: 71 BPM | SYSTOLIC BLOOD PRESSURE: 137 MMHG | DIASTOLIC BLOOD PRESSURE: 70 MMHG | HEIGHT: 67 IN | OXYGEN SATURATION: 95 % | BODY MASS INDEX: 35.94 KG/M2

## 2018-01-26 DIAGNOSIS — M25.561 RIGHT KNEE PAIN, UNSPECIFIED CHRONICITY: Primary | ICD-10-CM

## 2018-01-26 DIAGNOSIS — M25.562 LEFT KNEE PAIN, UNSPECIFIED CHRONICITY: ICD-10-CM

## 2018-01-26 NOTE — PROGRESS NOTES
[address] [place] [address]  [phone]           Patient: Gilbert Cardenas                MRN: [id number]       SSN: [id number]  YOB: 1950        AGE: 74 y.o. SEX: female  Body mass index is 35.87 kg/(m^2). PCP: Vivek Holm MD  01/26/18      This office note has been dictated. REVIEW OF SYSTEMS:  Constitutional: Negative for fever, chills, weight loss and malaise/fatigue. HENT: Negative. Eyes: Negative. Respiratory: Negative. Cardiovascular: Negative. Gastrointestinal: No bowel incontinence or constipation. Genitourinary: No bladder incontinence or saddle anesthesia. Skin: Negative. Neurological: Negative. Endo/Heme/Allergies: Negative. Psychiatric/Behavioral: Negative. Musculoskeletal: As per HPI above. Past Medical History:   Diagnosis Date    Breast cyst 1994    benign, removed    Deviated septum 04/2010    septoplasty    Diabetes mellitus     Type 2    GERD (gastroesophageal reflux disease)     severe    Hypertension     Hypothyroidism     Other elective surgery July 2008    toe surgery    Pure hypercholesterolemia     Stress thallium 09/30/2009    No evidence of ischemia or infarction; EF 78%; EKG portion negative with exercise capacity below average for age at 6 min of exercise    Varicose veins     closure in right leg 2008 & left leg 2007    Venous reflux study 06/24/2014    No DVT. [facility] occlusions of bilateral GSV. Current Outpatient Prescriptions:     metoprolol succinate (TOPROL-XL) 100 mg tablet, TAKE ONE TABLET BY MOUTH DAILY, Disp: 30 Tab, Rfl: 11    cetirizine (ZYRTEC) 10 mg tablet, Take 10 mg by mouth daily. , Disp: , Rfl:     losartan (COZAAR) 100 mg tablet, Take 100 mg by mouth daily. , Disp: , Rfl:     escitalopram oxalate (LEXAPRO) 10 mg tablet, Take 10 mg by mouth daily. , Disp: , Rfl:     pantoprazole (PROTONIX) 40 mg tablet, Take 40 mg by mouth two (2) times a day., Disp: , Rfl:     metformin (GLUCOPHAGE) 500 mg tablet, Take 500 mg by mouth daily (with breakfast). , Disp: , Rfl:     levothyroxine (LEVOXYL) 88 mcg tablet, Take 88 mcg by mouth daily. , Disp: , Rfl:     lorazepam (ATIVAN) 1 mg tablet, Take 0.5 mg by mouth two (2) times a day., Disp: , Rfl:     ERGOCALCIFEROL, VITAMIN D2, (VITAMIN D PO), Take 50,000 Units by mouth every Monday., Disp: , Rfl:     Allergies   Allergen Reactions    Pcn [Penicillins] Unknown (comments)       Social History     Social History    Marital status:      Spouse name: N/A    Number of children: N/A    Years of education: N/A     Occupational History    Not on file. Social History Main Topics    Smoking status: Never Smoker    Smokeless tobacco: Never Used    Alcohol use No    Drug use: No    Sexual activity: Not on file     Other Topics Concern    Not on file     Social History Narrative       Past Surgical History:   Procedure Laterality Date    CYSTOTOMY,EXCIS BLADDER TUMOR  2006    HX HYSTERECTOMY      HX KNEE REPLACEMENT Right     HX PARTIAL HYSTERECTOMY  1994    [place] EGD DELIVER THERMAL ENERGY SPHNCTR/CARDIA GERD      THYROIDECTOMY  1992           We did see Ms. Leal for followup with regards to her bilateral knees. The patient is status post right knee replacement surgery, and she is now about five months out of surgery. She is doing quite well and is quite happy with the results of the knee replacement. The left knee is beginning to bother her some. She does have known advanced arthritis. She is looking to get the left knee fixed in the next six to eight months. She has had no recent fever, chills, or systemic changes, no injuries, and no chest pain or shortness of breath. PHYSICAL EXAMINATION:  In general, the patient is alert and oriented x 3 in no acute distress. The patient is well-developed, well-nourished, with a normal affect. The patient is afebrile.  HEENT: Head is normocephalic and atraumatic. Pupils are equally round and reactive to light and accommodation. Extraocular eye movements are intact. Neck is supple. Trachea is midline. No JVD is present. Breathing is nonlabored. Examination of the lower extremities reveals pain-free range of motion of the hips. There is no pain to palpation of the greater trochanteric bursae. There is negative straight leg raise. There is negative calf tenderness. There is negative Elroys. There is no evidence of DVT present. Examination of the right knee reveals the skin is intact. The surgical wounds are healed nicely. There is no ecchymosis, no warmth. She has full range of motion and very good stability. The patella tracks nicely without rubs or crepitus noted. RADIOGRAPHS:  Radiographs in the office today, including AP, tunnel, lateral, and skyline of each of the knees, show the right total knee components are well-fixed with no evidence of loosening or fracture noted. The left knee does confirm end-staged arthritis with bone-to-bone eburnation. ASSESSMENT:      1. Status post right knee replacement. 2. Left knee end-staged arthritis. PLAN:  The patient is doing quite well. We will see her back in three months time for evaluation. She will call with any questions or concerns that shall arise.                    JR Teofilo GOVEA, NORRIS, ATC

## 2018-02-12 ENCOUNTER — TELEPHONE (OUTPATIENT)
Dept: ORTHOPEDIC SURGERY | Age: 68
End: 2018-02-12

## 2018-02-12 NOTE — TELEPHONE ENCOUNTER
Contacted pt at her home number. Pt informed that she needs to be seen due to her fall and the swelling to her knee. Pt states that she will call tomorrow to schedule an appointment for sometime this week.

## 2018-02-12 NOTE — TELEPHONE ENCOUNTER
Patient called to report that she had a fall last Tuesday on rt knee - knee was swollen after fall and it appears it may still be a bit swollen. She did not ice it or seek any treatment and she does not have any sharp pains, but it does feel a bit stiff. She's not sure if she should be seen or not. Please contact patient to discuss further at [phone].

## 2018-02-15 ENCOUNTER — OFFICE VISIT (OUTPATIENT)
Dept: ORTHOPEDIC SURGERY | Age: 68
End: 2018-02-15

## 2018-02-15 VITALS
DIASTOLIC BLOOD PRESSURE: 64 MMHG | WEIGHT: 232.6 LBS | HEART RATE: 74 BPM | BODY MASS INDEX: 36.51 KG/M2 | OXYGEN SATURATION: 93 % | HEIGHT: 67 IN | SYSTOLIC BLOOD PRESSURE: 140 MMHG

## 2018-02-15 DIAGNOSIS — S80.01XA CONTUSION OF RIGHT KNEE, INITIAL ENCOUNTER: ICD-10-CM

## 2018-02-15 DIAGNOSIS — Z96.651 STATUS POST TOTAL RIGHT KNEE REPLACEMENT: ICD-10-CM

## 2018-02-15 DIAGNOSIS — M25.561 RIGHT KNEE PAIN, UNSPECIFIED CHRONICITY: Primary | ICD-10-CM

## 2018-02-15 NOTE — PROGRESS NOTES
[address] [place] [address]  [phone]           Patient: Elsa Robison                MRN: [id number]       SSN: [id number]  YOB: 1950        AGE: 74 y.o. SEX: female  Body mass index is 36.43 kg/(m^2). PCP: Pb Castillo MD  02/15/18      This office note has been dictated. REVIEW OF SYSTEMS:  Constitutional: Negative for fever, chills, weight loss and malaise/fatigue. HENT: Negative. Eyes: Negative. Respiratory: Negative. Cardiovascular: Negative. Gastrointestinal: No bowel incontinence or constipation. Genitourinary: No bladder incontinence or saddle anesthesia. Skin: Negative. Neurological: Negative. Endo/Heme/Allergies: Negative. Psychiatric/Behavioral: Negative. Musculoskeletal: As per HPI above. Past Medical History:   Diagnosis Date    Breast cyst 1994    benign, removed    Deviated septum 04/2010    septoplasty    Diabetes mellitus     Type 2    GERD (gastroesophageal reflux disease)     severe    Hypertension     Hypothyroidism     Other elective surgery July 2008    toe surgery    Pure hypercholesterolemia     Stress thallium 09/30/2009    No evidence of ischemia or infarction; EF 78%; EKG portion negative with exercise capacity below average for age at 6 min of exercise    Varicose veins     closure in right leg 2008 & left leg 2007    Venous reflux study 06/24/2014    No DVT. [facility] occlusions of bilateral GSV. Current Outpatient Prescriptions:     metoprolol succinate (TOPROL-XL) 100 mg tablet, TAKE ONE TABLET BY MOUTH DAILY, Disp: 30 Tab, Rfl: 11    cetirizine (ZYRTEC) 10 mg tablet, Take 10 mg by mouth daily. , Disp: , Rfl:     escitalopram oxalate (LEXAPRO) 10 mg tablet, Take 10 mg by mouth daily. , Disp: , Rfl:     pantoprazole (PROTONIX) 40 mg tablet, Take 40 mg by mouth two (2) times a day., Disp: , Rfl:     metformin (GLUCOPHAGE) 500 mg tablet, Take 500 mg by mouth daily (with breakfast). , Disp: , Rfl:     lorazepam (ATIVAN) 1 mg tablet, Take 0.5 mg by mouth two (2) times a day., Disp: , Rfl:     ERGOCALCIFEROL, VITAMIN D2, (VITAMIN D PO), Take 50,000 Units by mouth every Monday., Disp: , Rfl:     losartan (COZAAR) 100 mg tablet, Take 100 mg by mouth daily. , Disp: , Rfl:     levothyroxine (LEVOXYL) 88 mcg tablet, Take 88 mcg by mouth daily. , Disp: , Rfl:     Allergies   Allergen Reactions    Pcn [Penicillins] Unknown (comments)       Social History     Social History    Marital status:      Spouse name: N/A    Number of children: N/A    Years of education: N/A     Occupational History    Not on file. Social History Main Topics    Smoking status: Never Smoker    Smokeless tobacco: Never Used    Alcohol use No    Drug use: No    Sexual activity: Not on file     Other Topics Concern    Not on file     Social History Narrative       Past Surgical History:   Procedure Laterality Date    CYSTOTOMY,EXCIS BLADDER TUMOR  2006    HX HYSTERECTOMY      HX KNEE REPLACEMENT Right     HX PARTIAL HYSTERECTOMY  1994    [place] EGD DELIVER THERMAL ENERGY SPHNCTR/CARDIA GERD      THYROIDECTOMY  1992         We did see Ms. Leal for followup with regards to her right knee. The patient, unfortunately, had a fall this past Tuesday landing directly on her right knee causing discomfort. She was able to get up and ambulate afterwards. However, the knee pain was quite bad. It is improving. She denies any feelings of instability. She is able to walk without complications. There is no radiating pain or numbness down the lower extremities. There are no fever or chills to report. PHYSICAL EXAMINATION:  In general, the patient is alert and oriented x 3 in no acute distress. The patient is well-developed, well-nourished, with a normal affect. The patient is afebrile. HEENT:  Head is normocephalic and atraumatic.   Pupils are equally round and reactive to light and accommodation. Extraocular eye movements are intact. Neck is supple. Trachea is midline. No JVD is present. Breathing is nonlabored. Examination of the lower extremities reveals pain-free range of motion of the hips. There is no pain to palpation of the greater trochanteric bursae. There is negative straight leg raise. There is negative calf tenderness. There is negative Elroys. There is no evidence of DVT present. Examination of the right knee reveals the skin is intact. There is no ecchymosis, no warmth, and no signs of infection or cellulitis present. She has full range of motion, no extensor lag, and no defects noted to the extensor mechanism. She has full flexion. The patella tracks nicely without rubs or crepitus noted. Stability of the knee is excellent both in extension and mid-flexion. RADIOGRAPHS:  Radiographs in the office, including AP, lateral, and skyline of the right knee, show total knee components are well-fixed. No evidence of loosening or fracture is noted. ASSESSMENT:      1. Status post right knee replacement. 2. Contusion right knee. PLAN:  At this point, the patient will continue with RICE. We will see her back in the office as scheduled in April or sooner if necessary. She will call with any questions or concerns that shall arise.                        JR Teofilo GOVEA, NORRIS, ATC

## 2018-03-02 NOTE — PROGRESS NOTES
[facility]  [address] [facility] [address]  [place], [address]  [phone] fax    Physical Therapy Discharge Summary  Patient name: Kiran Jett Start of Care: 10/9/2017   Referral source: Candice Foster : 1950                         Medical Diagnosis: Presence of right artificial knee joint [Z96.651] Onset Date:2017                         Treatment Diagnosis: R TKA   Prior Hospitalization: see medical history Provider#: [id number]   Medications: Verified on Patient summary List    Comorbidities: heart dz, depression, diabetes, arthritis, thyroid problems, HTN   Prior Level of Function: Pt ambulated with [facility] prior to surgery  Visits from Start of Care: 13    Missed Visits: 0  Reporting Period : 2017 to 2017      Summary of Care:  Progress towards goals / Updated goals:  Short Term Goals: To be accomplished in 2 treatments:  1. Pt will demonstrate I and compliance with HEP to promote active role in rehabilitation. -reports semi-compliance (10/24/2017)  2. Pt will improve R knee extension to <5 deg for improved gait efficiency. -progressing, currently lacking five degrees (10/26/2017); Met lacking 2 deg 10/31/17  Long Term Goals: To be accomplished in 4 weeks:  1. Pt will improve FOTO score to 47 to demonstrate improved quality of life.-met, score to 48 (10/26/2017)  2. Pt will demonstrate R knee AROM flexion to 120 deg for improved ease of stair negotiation. Near met 119 deg 10/31/17, AAROM to 120 degrees 17 met, AROM flexion to 123 degrees (2017)  3. Pt will improve R knee and hip strength to 4/5 for improved stability with ADLs-progressing, R knee flex: 4/5. Ext: 4-/5. Right hip grossly 4-/5 (2017)  4.  Pt will perform 10 sit to stands from table without UE use to improve ease of transfers in the home.-progressing, able to perform without UE assist, however, presents with asymmetrical weight bear (10/26/2017); improved symmetry 10/31/17       ASSESSMENT/RECOMMENDATIONS: Pt shows improved functional mobility with PT as well as improved knee ROM. She requests to D/C after f/u with MD. No further instructions provided to patient.     [x]Discontinue therapy: []Patient has reached or is progressing toward set goals      [x]Patient is non-compliant or has abdicated      []Due to lack of appreciable progress towards set goals         Goal  CK= 40-59%   D/C  CK= 40-59%    Mehul Carranza DPT, CMTPT 3/2/2018 5:42 PM

## 2018-06-29 ENCOUNTER — TELEPHONE (OUTPATIENT)
Dept: ORTHOPEDIC SURGERY | Age: 68
End: 2018-06-29

## 2018-06-29 NOTE — TELEPHONE ENCOUNTER
Notified patient she could stop the compression stockings now. She stated her \"other\" doctor had her wearing them for years for her circulation and I told her she could go back to her \"other\" doctors recommendations at this time.

## 2018-06-29 NOTE — TELEPHONE ENCOUNTER
Pt called stating she has a total knee in September 2017 and has been wearing her compression stockings. Pt would like to know if she is able to go back to wearing her panty hose now. Pt states her knee is still a little swollen but wanted to ask before she started to wear them again. Please advise pt at [phone].

## 2018-08-10 ENCOUNTER — HOSPITAL ENCOUNTER (OUTPATIENT)
Dept: MAMMOGRAPHY | Age: 68
Discharge: HOME | End: 2018-08-10
Attending: OBSTETRICS & GYNECOLOGY
Payer: COMMERCIAL

## 2018-08-10 DIAGNOSIS — Z12.31 VISIT FOR SCREENING MAMMOGRAM: ICD-10-CM

## 2018-08-10 PROCEDURE — 77067 SCR MAMMO BI INCL CAD: CPT

## 2018-08-20 ENCOUNTER — OFFICE VISIT (OUTPATIENT)
Dept: CARDIOLOGY CLINIC | Age: 68
End: 2018-08-20

## 2018-08-20 VITALS
HEART RATE: 59 BPM | SYSTOLIC BLOOD PRESSURE: 128 MMHG | BODY MASS INDEX: 35.79 KG/M2 | OXYGEN SATURATION: 97 % | WEIGHT: 228 LBS | HEIGHT: 67 IN | DIASTOLIC BLOOD PRESSURE: 84 MMHG

## 2018-08-20 DIAGNOSIS — K21.9 GASTROESOPHAGEAL REFLUX DISEASE, ESOPHAGITIS PRESENCE NOT SPECIFIED: ICD-10-CM

## 2018-08-20 DIAGNOSIS — R00.2 PALPITATIONS: ICD-10-CM

## 2018-08-20 DIAGNOSIS — E78.5 DYSLIPIDEMIA: Primary | ICD-10-CM

## 2018-08-20 DIAGNOSIS — E11.21 TYPE 2 DIABETES MELLITUS WITH NEPHROPATHY (HCC): ICD-10-CM

## 2018-08-20 DIAGNOSIS — R07.89 OTHER CHEST PAIN: ICD-10-CM

## 2018-08-20 DIAGNOSIS — E66.01 SEVERE OBESITY (BMI 35.0-39.9): ICD-10-CM

## 2018-08-20 RX ORDER — ASPIRIN 325 MG
TABLET, DELAYED RELEASE (ENTERIC COATED) ORAL
Refills: 1 | COMMUNITY
Start: 2018-06-14

## 2018-08-20 NOTE — PROGRESS NOTES
Review of Systems   Constitutional: Negative for chills, fever, malaise/fatigue and weight loss. Respiratory: Negative for cough, hemoptysis, shortness of breath and wheezing. Cardiovascular: Positive for chest pain, palpitations, orthopnea and leg swelling. Gastrointestinal: Positive for heartburn. Negative for abdominal pain, blood in stool, constipation, diarrhea, melena, nausea and vomiting. Musculoskeletal: Positive for joint pain. Negative for falls and myalgias. Neurological: Negative for dizziness.

## 2018-08-20 NOTE — PROGRESS NOTES
1. Have you been to the ER, urgent care clinic since your last visit? Hospitalized since your last visit?no    2. Have you seen or consulted any other health care providers outside of the [facility] since your last visit? Include any pap smears or colon screening.  no

## 2018-08-20 NOTE — MR AVS SNAPSHOT
[address] [place] [address] [place] [address] [phone] Patient: Hiren Griffin MRN: [id number] NSL:[birth year] Visit Information Date & Time Provider Department Dept. Phone Encounter #  
 8/20/2018  8:20 AM Azalea Carvalho DO [facility] [id number] Upcoming Health Maintenance Date Due Hepatitis C Screening 1950 LIPID PANEL Q1 1950 FOOT EXAM Q1 9/22/1962 MICROALBUMIN Q1 9/22/1962 EYE EXAM RETINAL OR DILATED Q1 9/22/1962 DTaP/Tdap/Td series (1 - Tdap) 9/22/1973 FOBT Q 1 YEAR AGE 50-75 9/22/2002 ZOSTER VACCINE AGE 60> 7/22/2012 GLAUCOMA SCREENING Q2Y 9/22/2017 Bone Densitometry (Dexa) Screening 9/22/2017 Pneumococcal 65+ Low/Medium Risk (1 of 2 - PCV13) 9/22/2017 HEMOGLOBIN A1C Q6M 3/18/2018 MEDICARE YEARLY EXAM 3/28/2018 Influenza Age 5 to Adult 8/1/2018 BREAST CANCER SCRN MAMMOGRAM 8/10/2020 Allergies as of 8/20/2018  Review Complete On: 8/20/2018 By: Azalea Carvalho DO Severity Noted Reaction Type Reactions Pcn [Penicillins]  09/09/2011    Unknown (comments) Current Immunizations  Never Reviewed No immunizations on file. Not reviewed this visit You Were Diagnosed With   
  
 Codes Comments Dyslipidemia    -  Primary ICD-10-CM: E78.5 ICD-9-CM: 272.4 Other chest pain     ICD-10-CM: R07.89 ICD-9-CM: 786.59 Palpitations     ICD-10-CM: R00.2 ICD-9-CM: 785.1 Severe obesity (BMI 35.0-39.9) (HCC)     ICD-10-CM: E66.01 
ICD-9-CM: 278.01 Type 2 diabetes mellitus with nephropathy (HCC)     ICD-10-CM: E11.21 
ICD-9-CM: 250.40, 583.81 Gastroesophageal reflux disease, esophagitis presence not specified     ICD-10-CM: K21.9 ICD-9-CM: 530.81 Vitals BP Pulse Height(growth percentile) Weight(growth percentile) SpO2 BMI  
 128/84 (!) 59 5' 7\" (1.702 m) 228 lb (103.4 kg) 97% 35.71 kg/m2 OB Status Smoking Status Hysterectomy Never Smoker Vitals History BMI and BSA Data Body Mass Index Body Surface Area 35.71 kg/m 2 2.21 m 2 Preferred Pharmacy Pharmacy Name Phone [id number] [place], [address] [facility] [place] [place] [facility] [phone] Your Updated Medication List  
  
   
This list is accurate as of 8/20/18  9:01 AM.  Always use your most recent med list.  
  
  
  
  
 cetirizine 10 mg tablet Commonly known as:  ZYRTEC Take 10 mg by mouth daily. cholecalciferol 50,000 unit capsule Commonly known as:  VITAMIN D3  
TAKE 1 CAPSULE BY MOUTH ONCE WEEKLY  
  
 GLUCOPHAGE 500 mg tablet Generic drug:  metFORMIN Take 500 mg by mouth daily (with breakfast). LEVOXYL 88 mcg tablet Generic drug:  levothyroxine Take 88 mcg by mouth daily. LEXAPRO 10 mg tablet Generic drug:  escitalopram oxalate Take 10 mg by mouth daily. LORazepam 1 mg tablet Commonly known as:  ATIVAN Take 0.5 mg by mouth two (2) times a day. metoprolol succinate 100 mg tablet Commonly known as:  TOPROL-XL  
TAKE ONE TABLET BY MOUTH DAILY pantoprazole 40 mg tablet Commonly known as:  PROTONIX Take 40 mg by mouth two (2) times a day. We Performed the Following AMB POC EKG ROUTINE W/ 12 LEADS, INTER & REP [36632 CPT(R)] Introducing [facility] SERVICES! [employer] introduces [employer] patient portal. Now you can access parts of your medical record, email your doctor's office, and request medication refills online. 1. In your internet browser, go to [email or web address] 2. Click on the First Time User? Click Here link in the Sign In box. You will see the New Member Sign Up page. 3. Enter your [employer] Access Code exactly as it appears below. You will not need to use this code after youve completed the sign-up process.  If you do not sign up before the expiration date, you must request a new code. · [employer] Access Code: [email or web address] Expires: 11/4/2018 11:27 AM 
 
4. Enter the last four digits of your Social Security Number (xxxx) and Date of Birth (mm/dd/yyyy) as indicated and click Submit. You will be taken to the next sign-up page. 5. Create a [employer] ID. This will be your [employer] login ID and cannot be changed, so think of one that is secure and easy to remember. 6. Create a [employer] password. You can change your password at any time. 7. Enter your Password Reset Question and Answer. This can be used at a later time if you forget your password. 8. Enter your e-mail address. You will receive e-mail notification when new information is available in [address] 9. Click Sign Up. You can now view and download portions of your medical record. 10. Click the Download Summary menu link to download a portable copy of your medical information. If you have questions, please visit the Frequently Asked Questions section of the [employer] website. Remember, [employer] is NOT to be used for urgent needs. For medical emergencies, dial 911. Now available from your iPhone and Android! Please provide this summary of care documentation to your next provider. Your primary care clinician is listed as Wilman Wright. If you have any questions after today's visit, please call [phone].

## 2018-08-20 NOTE — PROGRESS NOTES
HPI: I saw Charlesetta Severance Tynes in my office today in cardiovascular evaluation regarding her past problems with atypical anginal chest discomfort and some palpitations in the past . Ms. Leal is a pleasant, very anxious, 74year old  female with history of significant chest discomforts over the years, which have been felt to be related to gastroesophageal reflux disease and some to musculoskeletal issues. She did have a nuclear myocardial perfusion study for these atypical non anginal symptoms back in 2009, which was completely within normal limits. She comes in today and relates that she continues to have some vague chest discomforts which can occur at any time but seem to happen at times of stress and can last up to an hour or more and are relieved without any specific therapy and usually when she is thinking about something else. These discomforts are substernal and radiated 3-4/10 and not necessarily occurring at rest sounding most consistent with gastroesophageal reflux disease. She is complaining of some left arm tingling but she lays on her left arm it sounds as if she has compress the ulnar nerve causing some radiculopathy. She does have very rare palpitations but these are rather short-lived. .    Encounter Diagnoses   Name Primary?  Chest pain, atypical for angina     Palpitations     Dyslipidemia Yes    Severe obesity (BMI 35.0-39.9) (HCC)     Type 2 diabetes mellitus with nephropathy (HCC)     Gastroesophageal reflux disease, esophagitis presence not specified        Discussion: This patient's chest discomforts continue to sound GI and noncardiac. On exam she does have little epigastric tenderness as well so I would simply have her follow-up with Dr. Kristal Perales for further workup of this problem. She historically has some dyslipidemia but has not been on any cholesterol medications recently and historically has had some myalgias with statins.   I am going to get a copy of her latest lipid profile from Dr. James Siu for my records and will make further recommendations pending the review of that information. She does have some history of diabetes, but this is been well controlled metformin therapy. Her blood pressure is well-controlled today and her EKG appears to be stable so I am simply get a plan to see her again in several months or sooner if any new cardiovascular symptoms surface. PCP: Jose Garces MD      Past Medical History:   Diagnosis Date    Breast cyst 1994    benign, removed    Deviated septum 04/2010    septoplasty    Diabetes mellitus     Type 2    GERD (gastroesophageal reflux disease)     severe    Hypertension     Hypothyroidism     Other elective surgery July 2008    toe surgery    Pure hypercholesterolemia     Stress thallium 09/30/2009    No evidence of ischemia or infarction; EF 78%; EKG portion negative with exercise capacity below average for age at 6 min of exercise    Varicose veins     closure in right leg 2008 & left leg 2007    Venous reflux study 06/24/2014    No DVT. [facility] occlusions of bilateral GSV. Past Surgical History:   Procedure Laterality Date    CYSTOTOMY,EXCIS BLADDER TUMOR  2006    HX HYSTERECTOMY      HX KNEE REPLACEMENT Right     HX PARTIAL HYSTERECTOMY  1994    SC EGD DELIVER THERMAL ENERGY SPHNCTR/CARDIA GERD      THYROIDECTOMY  1992         Current Outpatient Rx   Name  Route  Sig  Dispense  Refill    losartan (COZAAR) 100 mg tablet    Oral    Take 100 mg by mouth daily.  atorvastatin (LIPITOR) 10 mg tablet    Oral    Take 10 mg by mouth daily.  escitalopram oxalate (LEXAPRO) 10 mg tablet    Oral    Take 10 mg by mouth daily.  metoprolol succinate (TOPROL-XL) 100 mg XL tablet    Oral    Take 1 Tab by mouth daily. 30 Tab    6      pantoprazole (PROTONIX) 40 mg tablet    Oral    Take 40 mg by mouth two (2) times a day.               metformin (GLUCOPHAGE) 500 mg tablet    Oral    Take 500 mg by mouth daily (with breakfast).  levothyroxine (LEVOXYL) 88 mcg tablet    Oral    Take 88 mcg by mouth daily.  lorazepam (ATIVAN) 1 mg tablet    Oral    Take 0.5 mg by mouth two (2) times a day.  ERGOCALCIFEROL, VITAMIN D2, (VITAMIN D PO)    Oral    Take 50,000 Units by mouth every Monday. Allergies   Allergen Reactions    Pcn [Penicillins] Unknown (comments)         Social History:   Social History   Substance Use Topics    Smoking status: Never Smoker    Smokeless tobacco: Never Used    Alcohol use No         Family history: family history includes Heart Attack in her maternal grandmother; Hypertension in her mother. Review of Systems:    Constitutional: Negative for chills, fever, malaise/fatigue and weight loss. Respiratory: Negative for cough, hemoptysis, shortness of breath and wheezing. Cardiovascular: Positive for chest pain, palpitations, orthopnea and leg swelling. Gastrointestinal: Positive for heartburn. Negative for abdominal pain, blood in stool, constipation, diarrhea, melena, nausea and vomiting. Musculoskeletal: Positive for joint pain. Negative for falls and myalgias. Neurological: Negative for dizziness. Physical Exam:   The patient is an alert, oriented, well developed, well nourished 74 y.o.  female who was in no acute distress at the time of my examination. Visit Vitals    /84    Pulse (!) 59    Ht 5' 7\" (1.702 m)    Wt 103.4 kg (228 lb)    SpO2 97%    BMI 35.71 kg/m2      BP Readings from Last 3 Encounters:   08/20/18 128/84   02/15/18 140/64   01/26/18 137/70        Wt Readings from Last 3 Encounters:   08/20/18 103.4 kg (228 lb)   02/15/18 105.5 kg (232 lb 9.6 oz)   01/26/18 103.9 kg (229 lb)       HEENT: Conjunctivae white, mucosa moist, no pallor or cyanosis. Neck: Supple without masses, tenderness or thyromegaly. No jugular venous distention. Carotid upstrokes are full bilaterally, without bruits. Cardiovascular: Chest is symmetrical with good excursion. There is some mild tenderness to palpation over the fifth left costochondral junction parasternally. [place] is not displaced. No lifts, heaves or thrills. S1 and S2 are normal, with a soft grade I-II/VI [place] along the LSB, with radiation to the base, without appreciable rubs, clicks or gallops. Lungs: Clear to auscultation in all fields. Abdomen: Soft. No masses or organomegaly. There is some epigastric tenderness. Extremities: No edema with full peripheral pulses. Review of Data: See PMH and Cardiology and Imaging sections for cardiac testing      Results for orders placed or performed in visit on 08/20/18   AMB POC EKG ROUTINE W/ 12 LEADS, INTER & REP     Status: None    Narrative    Sinus bradycardia, rate 59. Some minor nonspecific anterior and inferior ST-T changes. Compared to the EKG of January 23, 2018 with little interval change. Court Joy D.O., F.A.C.C. [facility]  [address] [place] [address] [facility]  [address] [place] [address]. [address] [address]  [address]    PLEASE NOTE:  This document has been produced using voice recognition software. Unrecognized errors in transcription may be present.

## 2018-08-24 ENCOUNTER — HOSPITAL ENCOUNTER (EMERGENCY)
Age: 68
Discharge: HOME | End: 2018-08-24
Attending: EMERGENCY MEDICINE
Payer: COMMERCIAL

## 2018-08-24 VITALS
WEIGHT: 228 LBS | RESPIRATION RATE: 16 BRPM | SYSTOLIC BLOOD PRESSURE: 177 MMHG | TEMPERATURE: 98.8 F | HEIGHT: 68 IN | HEART RATE: 66 BPM | BODY MASS INDEX: 34.56 KG/M2 | DIASTOLIC BLOOD PRESSURE: 86 MMHG | OXYGEN SATURATION: 98 %

## 2018-08-24 DIAGNOSIS — Z76.0 MEDICATION REFILL: Primary | ICD-10-CM

## 2018-08-24 DIAGNOSIS — F41.1 ANXIETY STATE: ICD-10-CM

## 2018-08-24 PROCEDURE — 99282 EMERGENCY DEPT VISIT SF MDM: CPT

## 2018-08-24 RX ORDER — LORAZEPAM 1 MG/1
0.5 TABLET ORAL 2 TIMES DAILY
Qty: 7 TAB | Refills: 0 | Status: SHIPPED | OUTPATIENT
Start: 2018-08-24 | End: 2019-08-07

## 2018-08-25 NOTE — ED PROVIDER NOTES
EMERGENCY DEPARTMENT HISTORY AND PHYSICAL EXAM    8:03 PM      Date: 8/24/2018  Patient Name: Sim Ford    History of Presenting Illness     Chief Complaint   Patient presents with    Medication Refill         History Provided By: Patient    Chief Complaint: med refill       Additional History (Context): Sim Ford is a 74 y.o. female with diabetes, hypertension, hyperlipidemia and hypothyroidism and anxiety who presents for med refill. She takes ativan on a daily basis for anxiety. She has been gradually weaning off of this and is down from 3 daily to 1 pill per day. She was tgiven a 90 day supply 5/25/18 which she just ran out of, and was informed by her doctors office today that they cannot call it in. She requests a refill until she can make an appt with her Dr next week. PCP: Deepika Morris MD    Current Outpatient Prescriptions   Medication Sig Dispense Refill    LORazepam (ATIVAN) 1 mg tablet Take 0.5 Tabs by mouth two (2) times a day. Max Daily Amount: 1 mg. 7 Tab 0    cholecalciferol (VITAMIN D3) 50,000 unit capsule TAKE 1 CAPSULE BY MOUTH ONCE WEEKLY  1    metoprolol succinate (TOPROL-XL) 100 mg tablet TAKE ONE TABLET BY MOUTH DAILY 30 Tab 11    cetirizine (ZYRTEC) 10 mg tablet Take 10 mg by mouth daily.  escitalopram oxalate (LEXAPRO) 10 mg tablet Take 10 mg by mouth daily.  pantoprazole (PROTONIX) 40 mg tablet Take 40 mg by mouth two (2) times a day.  metformin (GLUCOPHAGE) 500 mg tablet Take 500 mg by mouth daily (with breakfast).  levothyroxine (LEVOXYL) 88 mcg tablet Take 88 mcg by mouth daily.          Past History     Past Medical History:  Past Medical History:   Diagnosis Date    Breast cyst 1994    benign, removed    Deviated septum 04/2010    septoplasty    Diabetes mellitus     Type 2    GERD (gastroesophageal reflux disease)     severe    Hypertension     Hypothyroidism     Other elective surgery July 2008    toe surgery    Pure hypercholesterolemia     Stress thallium 09/30/2009    No evidence of ischemia or infarction; EF 78%; EKG portion negative with exercise capacity below average for age at 6 min of exercise    Varicose veins     closure in right leg 2008 & left leg 2007    Venous reflux study 06/24/2014    No DVT. [facility] occlusions of bilateral GSV. Past Surgical History:  Past Surgical History:   Procedure Laterality Date    CYSTOTOMY,EXCIS BLADDER TUMOR  2006    HX HYSTERECTOMY      HX KNEE REPLACEMENT Right     HX PARTIAL HYSTERECTOMY  1994    CT EGD DELIVER THERMAL ENERGY SPHNCTR/CARDIA GERD      THYROIDECTOMY  1992       Family History:  Family History   Problem Relation Age of Onset    Heart Attack Maternal Grandmother     Hypertension Mother        Social History:  Social History   Substance Use Topics    Smoking status: Never Smoker    Smokeless tobacco: Never Used    Alcohol use No       Allergies: Allergies   Allergen Reactions    Pcn [Penicillins] Unknown (comments)         Review of Systems       Review of Systems   Constitutional: Negative for fever. HENT: Negative for facial swelling. Eyes: Negative for visual disturbance. Respiratory: Negative for shortness of breath. Cardiovascular: Negative for chest pain. Gastrointestinal: Negative for abdominal pain. Genitourinary: Negative for dysuria. Musculoskeletal: Negative for neck pain. Skin: Negative for rash. Neurological: Negative for dizziness. Psychiatric/Behavioral: Negative for confusion. All other systems reviewed and are negative. Physical Exam     Visit Vitals    /86 (BP 1 Location: Left arm, BP Patient Position: At rest)    Pulse 66    Temp 98.8 °F (37.1 °C)    Resp 16    Ht 5' 8\" (1.727 m)    Wt 103.4 kg (228 lb)    SpO2 98%    BMI 34.67 kg/m2         Physical Exam   Constitutional: She is oriented to person, place, and time. She appears well-developed and well-nourished. No distress.    HENT: Head: Normocephalic and atraumatic. Eyes: Conjunctivae are normal.   Neck: Normal range of motion. Cardiovascular: Normal rate and regular rhythm. Pulmonary/Chest: Effort normal.   Abdominal: She exhibits no distension. Musculoskeletal: Normal range of motion. Neurological: She is alert and oriented to person, place, and time. Skin: Skin is warm and dry. She is not diaphoretic. Psychiatric: She has a normal mood and affect. Nursing note and vitals reviewed. Diagnostic Study Results     Labs -  No results found for this or any previous visit (from the past 12 hour(s)). Radiologic Studies -   No orders to display         Medical Decision Making   I am the first provider for this patient. I reviewed the vital signs, available nursing notes, past medical history, past surgical history, family history and social history. Vital Signs-Reviewed the patient's vital signs. Records Reviewed: Nursing Notes (Time of Review: 8:03 PM)    ED Course: Progress Notes, Reevaluation, and Consults:      Provider Notes (Medical Decision Making): MDM  Number of Diagnoses or Management Options  Anxiety state:   Medication refill:   Diagnosis management comments: Asymptomatic. Here for med refill. This is the first time she has presented to the ER for this issue, so I agreed to refill  Her Rx until she can be seen by her PCP next week. Explained that she will need to make appts ahead of time in the future. Diagnosis     Clinical Impression:   1. Medication refill    2.  Anxiety state        Disposition:     Follow-up Information     Follow up With Details Comments Contact Info    Tammy Broderick MD Schedule an appointment as soon as possible for a visit  Rashad [place]   [phone]      [facility] EMERGENCY DEPT  Immediately if symptoms worsen [address] [facility]  [phone]           Patient's Medications   Start Taking No medications on file   Continue Taking    CETIRIZINE (ZYRTEC) 10 MG TABLET    Take 10 mg by mouth daily. CHOLECALCIFEROL (VITAMIN D3) 50,000 UNIT CAPSULE    TAKE 1 CAPSULE BY MOUTH ONCE WEEKLY    ESCITALOPRAM OXALATE (LEXAPRO) 10 MG TABLET    Take 10 mg by mouth daily. LEVOTHYROXINE (LEVOXYL) 88 MCG TABLET    Take 88 mcg by mouth daily. METFORMIN (GLUCOPHAGE) 500 MG TABLET    Take 500 mg by mouth daily (with breakfast). METOPROLOL SUCCINATE (TOPROL-XL) 100 MG TABLET    TAKE ONE TABLET BY MOUTH DAILY    PANTOPRAZOLE (PROTONIX) 40 MG TABLET    Take 40 mg by mouth two (2) times a day. These Medications have changed    Modified Medication Previous Medication    LORAZEPAM (ATIVAN) 1 MG TABLET lorazepam (ATIVAN) 1 mg tablet       Take 0.5 Tabs by mouth two (2) times a day. Max Daily Amount: 1 mg. Take 0.5 mg by mouth two (2) times a day. Stop Taking    No medications on file     _______________________________    Attestations:  Cornelius Schwab PA-C acting as a scribe for and in the presence of NORRIS Rebolledo      August 24, 2018 at 8:09 PM       Provider Attestation:      I personally performed the services described in the documentation, reviewed the documentation, as recorded by the scribe in my presence, and it accurately and completely records my words and actions.  August 24, 2018 at 8:09 PM - NORRIS Rebolledo  _______________________________

## 2018-09-08 ENCOUNTER — HOSPITAL ENCOUNTER (OUTPATIENT)
Dept: LAB | Age: 68
Discharge: HOME | End: 2018-09-08

## 2018-09-08 LAB
A-G RATIO,AGRAT: 1.5 RATIO (ref 1.1–2.6)
ALBUMIN SERPL-MCNC: 4.1 G/DL (ref 3.5–5)
ALP SERPL-CCNC: 50 U/L (ref 40–120)
ALT SERPL-CCNC: 12 U/L (ref 5–40)
AST SERPL W P-5'-P-CCNC: 12 U/L (ref 10–37)
BILIRUB SERPL-MCNC: 0.4 MG/DL (ref 0.2–1.2)
BILIRUBIN, DIRECT,CBIL: <0.2 MG/DL (ref 0–0.3)
CHOLEST SERPL-MCNC: 199 MG/DL (ref 110–200)
GLOBULIN,GLOB: 2.7 G/DL (ref 2–4)
HDLC SERPL-MCNC: 4.4 MG/DL (ref 0–5)
HDLC SERPL-MCNC: 45 MG/DL (ref 40–59)
LDLC SERPL CALC-MCNC: 138 MG/DL (ref 50–99)
PROT SERPL-MCNC: 6.8 G/DL (ref 6.2–8.1)
SENTARA SPECIMEN COL,SENBCF: NORMAL
TRIGL SERPL-MCNC: 81 MG/DL (ref 40–149)
VLDLC SERPL CALC-MCNC: 16 MG/DL (ref 8–30)

## 2018-09-08 PROCEDURE — 99001 SPECIMEN HANDLING PT-LAB: CPT | Performed by: INTERNAL MEDICINE

## 2018-09-11 NOTE — PROGRESS NOTES
Per your last note \" She historically has some dyslipidemia but has not been on any cholesterol medications recently and historically has had some myalgias with statins. I am going to get a copy of her latest lipid profile from Dr. James Siu for my records and will make further recommendations pending the review of that information.

## 2018-09-17 NOTE — PROGRESS NOTES
I think I just got a copy for our records. She does have some elevation of her non-HDL cholesterol, but she does not tolerate statins so I really do not think there is anything for us to do at this time.  ES

## 2018-10-17 RX ORDER — METOPROLOL SUCCINATE 100 MG/1
TABLET, EXTENDED RELEASE ORAL
Qty: 30 TAB | Refills: 11 | Status: SHIPPED | OUTPATIENT
Start: 2018-10-17 | End: 2019-09-04 | Stop reason: SDUPTHER

## 2018-12-03 ENCOUNTER — TELEPHONE (OUTPATIENT)
Dept: ORTHOPEDIC SURGERY | Age: 68
End: 2018-12-03

## 2018-12-03 NOTE — TELEPHONE ENCOUNTER
Patient called in states that she fell on her right knee (sx knee 09/18/17 ) about a month ago on some tile. She states that it did not swell or anything then but now she is having some issues. She took first available of 12/06/18@  but wanted to make sure she did not needs to be seen sooner.

## 2018-12-06 ENCOUNTER — OFFICE VISIT (OUTPATIENT)
Dept: ORTHOPEDIC SURGERY | Age: 68
End: 2018-12-06

## 2018-12-06 VITALS
DIASTOLIC BLOOD PRESSURE: 64 MMHG | HEIGHT: 68 IN | BODY MASS INDEX: 34.59 KG/M2 | OXYGEN SATURATION: 98 % | HEART RATE: 63 BPM | RESPIRATION RATE: 16 BRPM | WEIGHT: 228.2 LBS | SYSTOLIC BLOOD PRESSURE: 136 MMHG | TEMPERATURE: 97.4 F

## 2018-12-06 DIAGNOSIS — G89.29 CHRONIC BILATERAL LOW BACK PAIN, WITH SCIATICA PRESENCE UNSPECIFIED: ICD-10-CM
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
## 2018-12-06 DIAGNOSIS — R20.2 NUMBNESS AND TINGLING OF BOTH LEGS: ICD-10-CM

## 2018-12-06 DIAGNOSIS — R20.0 NUMBNESS AND TINGLING OF BOTH LEGS: ICD-10-CM

## 2018-12-06 DIAGNOSIS — M25.561 RIGHT KNEE PAIN, UNSPECIFIED CHRONICITY: ICD-10-CM

## 2018-12-06 DIAGNOSIS — M54.5 CHRONIC BILATERAL LOW BACK PAIN, WITH SCIATICA PRESENCE UNSPECIFIED: ICD-10-CM

## 2018-12-06 DIAGNOSIS — Z96.651 H/O TOTAL KNEE REPLACEMENT, RIGHT: Primary | ICD-10-CM

## 2018-12-06 NOTE — PROGRESS NOTES
1. Have you been to the ER, urgent care clinic since your last visit? Hospitalized since your last visit? No 
 
2. Have you seen or consulted any other health care providers outside of the [address] [place] [address] since your last visit? Include any pap smears or colon screening.  No

## 2018-12-06 NOTE — PROGRESS NOTES
Patient: Michael Garcia                MRN: [id number]       SSN: [id number] YOB: 1950        AGE: 79 y.o. SEX: female Body mass index is 34.7 kg/m². PCP: Brenton De Leon MD 
12/06/18 HISTORY: I had the pleasure of reviewing Liu Grant. Liu Grant had another fall. She fell about six months ago. This time, her  had open heart surgery, and they were bringing a big box of shopping materials in when she lost her balance and fell directly onto the knee replacement about four weeks ago. The pain is slowly improving. She has noticed some burning going down the right leg. She has had no bowel or bladder problems. She has no shortness of breath or chest pain. She denies calf pain. She has mild to moderate knee pain. PHYSICAL EXAMINATION:  On examination today, she is very soft-spoken. There is no start-up pain. The hips rotate adequately. The back is quite tender for her. She has considerable decrease of sensation to L5 laterally. Tib/ant is 5/5. EHL is -5/5 on the right side. Straight leg raise is just equivocal.  Calf is nontender. Elroy's sign is negative. There is a mild Baker cyst.  There is a well healed incision. There is good motion. The quadriceps are intact, and the stability is quite acceptable. She does have tenderness over the medial collateral ligament distally. RADIOGRAPHS:  X-rays reviewed show the implants are well-fixed and well-fixed. IMPRESSION:  My overall impression is another fall likely due to weakness, and radiculopathy. PLAN:  We will obtain an MRI of the lumbar spine. She also has a medial collateral ligament sprain, which I do not think requires a brace currently. I would recommend an MRI of the lumbar spine, and we may consider some physical therapy. I will see her back after the MRI of the lumbar spine. REVIEW OF SYSTEMS:   
 
CON: negative for weight loss, fever EYE: negative for double vision ENT: negative for hoarseness RS:   negative for Tb 
GI:    negative for blood in stool :  negative for blood in urine Other systems reviewed and noted below. Past Medical History:  
Diagnosis Date  Breast cyst 1994  
 benign, removed  Deviated septum 04/2010  
 septoplasty  Diabetes mellitus Type 2  
 GERD (gastroesophageal reflux disease) severe  Hypertension  Hypothyroidism  Other elective surgery July 2008  
 toe surgery  Pure hypercholesterolemia  Stress thallium 09/30/2009 No evidence of ischemia or infarction; EF 78%; EKG portion negative with exercise capacity below average for age at 6 min of exercise  Varicose veins   
 closure in right leg 2008 & left leg 2007  Venous reflux study 06/24/2014 No DVT. [facility] occlusions of bilateral GSV. Family History Problem Relation Age of Onset  Heart Attack Maternal Grandmother  Hypertension Mother Current Outpatient Medications Medication Sig Dispense Refill  metoprolol succinate (TOPROL-XL) 100 mg tablet TAKE ONE TABLET BY MOUTH DAILY 30 Tab 11  
 LORazepam (ATIVAN) 1 mg tablet Take 0.5 Tabs by mouth two (2) times a day. Max Daily Amount: 1 mg. 7 Tab 0  cholecalciferol (VITAMIN D3) 50,000 unit capsule TAKE 1 CAPSULE BY MOUTH ONCE WEEKLY  1  
 cetirizine (ZYRTEC) 10 mg tablet Take 10 mg by mouth daily.  escitalopram oxalate (LEXAPRO) 10 mg tablet Take 10 mg by mouth daily.  pantoprazole (PROTONIX) 40 mg tablet Take 40 mg by mouth two (2) times a day.  metformin (GLUCOPHAGE) 500 mg tablet Take 500 mg by mouth daily (with breakfast).  levothyroxine (LEVOXYL) 88 mcg tablet Take 88 mcg by mouth daily. Allergies Allergen Reactions  Pcn [Penicillins] Unknown (comments) Past Surgical History:  
Procedure Laterality Date [address] TUMOR  2006  HX HYSTERECTOMY  HX KNEE REPLACEMENT Right [address] [facility]  IN EGD DELIVER THERMAL ENERGY SPHNCTR/CARDIA GERD [address]  Social History Socioeconomic History  Marital status:  Spouse name: Not on file  Number of children: Not on file  Years of education: Not on file  Highest education level: Not on file Social Needs  Financial resource strain: Not on file  Food insecurity - worry: Not on file  Food insecurity - inability: Not on file  Transportation needs - medical: Not on file  Transportation needs - non-medical: Not on file Occupational History  Not on file Tobacco Use  Smoking status: Never Smoker  Smokeless tobacco: Never Used Substance and Sexual Activity  Alcohol use: No  
 Drug use: No  
 Sexual activity: Not on file Other Topics Concern  Not on file Social History Narrative  Not on file Visit Vitals /64 Pulse 63 Temp 97.4 °F (36.3 °C) (Oral) Resp 16 Ht 5' 8\" (1.727 m) Wt 228 lb 3.2 oz (103.5 kg) SpO2 98% BMI 34.70 kg/m² PHYSICAL EXAMINATION: 
GENERAL: Alert and oriented x3, in no acute distress, well-developed, well-nourished, afebrile. HEART: No JVD. EYES: No scleral icterus NECK: No significant lymphadenopathy LUNGS: No respiratory compromise or indrawing ABDOMEN: Soft, non-tender, non-distended. Electronically signed by:  Alvina Cordoba MD

## 2018-12-19 ENCOUNTER — TELEPHONE (OUTPATIENT)
Dept: CARDIOLOGY CLINIC | Age: 68
End: 2018-12-19

## 2018-12-19 NOTE — TELEPHONE ENCOUNTER
----- Message from Clement Brar DO sent at 9/17/2018  8:00 AM EDT -----  I think I just got a copy for our records. She does have some elevation of her non-HDL cholesterol, but she does not tolerate statins so I really do not think there is anything for us to do at this time.  ES

## 2018-12-22 ENCOUNTER — HOSPITAL ENCOUNTER (OUTPATIENT)
Age: 68
Discharge: HOME | End: 2018-12-22
Attending: ORTHOPAEDIC SURGERY

## 2018-12-22 DIAGNOSIS — R20.2 NUMBNESS AND TINGLING OF BOTH LEGS: ICD-10-CM

## 2018-12-22 DIAGNOSIS — M54.5 CHRONIC BILATERAL LOW BACK PAIN, WITH SCIATICA PRESENCE UNSPECIFIED: ICD-10-CM

## 2018-12-22 DIAGNOSIS — R20.0 NUMBNESS AND TINGLING OF BOTH LEGS: ICD-10-CM

## 2018-12-22 DIAGNOSIS — G89.29 CHRONIC BILATERAL LOW BACK PAIN, WITH SCIATICA PRESENCE UNSPECIFIED: ICD-10-CM

## 2019-03-30 ENCOUNTER — HOSPITAL ENCOUNTER (OUTPATIENT)
Dept: GENERAL RADIOLOGY | Age: 69
Discharge: HOME | End: 2019-03-30
Payer: COMMERCIAL

## 2019-03-30 DIAGNOSIS — M54.2 CERVICALGIA: ICD-10-CM

## 2019-03-30 PROCEDURE — 72050 X-RAY EXAM NECK SPINE 4/5VWS: CPT

## 2019-07-11 ENCOUNTER — TELEPHONE (OUTPATIENT)
Dept: ORTHOPEDIC SURGERY | Age: 69
End: 2019-07-11

## 2019-07-11 DIAGNOSIS — R20.8 BURNING SENSATION OF LOWER EXTREMITY: Primary | ICD-10-CM

## 2019-07-11 NOTE — TELEPHONE ENCOUNTER
Patient called in states that for the last 3 weeks off and on her leg from her thigh to hip (not knee area) has been making a gurgling noise and burning. She was not sure if she should call Dr. Malvin Cali office or a neurologist. Patient states that if she should see a neurologist she would need a referral b/c she doesn't know of one. Patient can be reached at [phone].

## 2019-07-23 NOTE — PROGRESS NOTES
Problem: Mobility Impaired (Adult and Pediatric)  Goal: *Acute Goals and Plan of Care (Insert Text)  Physical Therapy Goals  Initiated 9/18/2017 and to be accomplished within 7 day(s)  1. Patient will move from supine to sit and sit to supine , scoot up and down and roll side to side in bed with modified independence. 2. Patient will transfer from bed to chair and chair to bed with modified independence using the least restrictive device. 3. Patient will perform sit to stand with modified independence. 4. Patient will ambulate with modified independence for >200 feet with the least restrictive device. 5. Patient will ascend/descend 8 stairs with 1 handrail(s) with supervision/set-up. 6. Patient will perform HEP independently and increased ROM and strength for carryover into functional tasks. Outcome: Progressing Towards Goal  PHYSICAL THERAPY TREATMENT     Patient: Florecita Savage (73 y.o. female)  Date: 9/19/2017  Diagnosis: Osteoarthritis of right knee, unspecified osteoarthritis type [M17.11] <principal problem not specified>  Procedure(s) (LRB):  RIGHT TOTAL KNEE ARTHROPLASTY (Right) 1 Day Post-Op  Precautions: Fall, WBAT  Chart, physical therapy assessment, plan of care and goals were reviewed. ASSESSMENT:  Pt continues to make good progress this afternoon, with increased distance during gait training as well as improved pacing. Pt instructed for body positioning within RW, posture correction. AROM 0-90 degrees in seated. Progression toward goals:  [X]      Improving appropriately and progressing toward goals  [ ]      Improving slowly and progressing toward goals  [ ]      Not making progress toward goals and plan of care will be adjusted       PLAN:  Patient continues to benefit from skilled intervention to address the above impairments. Continue treatment per established plan of care.   Discharge Recommendations:  Home Health  Further Equipment Recommendations for Discharge:  rolling walker SUBJECTIVE:   Patient stated It doesn't hurt as bad to walk as it did the first time.       OBJECTIVE DATA SUMMARY:   Critical Behavior:  Neurologic State: Alert  Orientation Level: Oriented X4  Cognition: Follows commands  Safety/Judgement: Fall prevention  Functional Mobility Training:  Bed Mobility:  Rolling: Supervision  Supine to Sit: Supervision  Scooting: Supervision  Transfers:  Sit to Stand: Contact guard assistance  Stand to Sit: Contact guard assistance  Balance:  Sitting: Intact  Standing: Impaired; With support (rolling walker)  Standing - Static: Good  Standing - Dynamic : Fair  Ambulation/Gait Training:  Distance (ft): 50 Feet (ft)  Assistive Device: Walker, rolling  Ambulation - Level of Assistance: Stand-by asssistance  Gait Abnormalities: Antalgic;Decreased step clearance; Step to gait  Therapeutic Exercises: Ankle pumps, heel slides  Pain:  Pt pain was reported as  4 pre-treatment. Pt pain was reported as 5 post-treatment. Intervention: iceman cooler     Activity Tolerance:   Good   Please refer to the flowsheet for vital signs taken during this treatment.   After treatment:   [X] Patient left in no apparent distress sitting up in chair  [ ] Patient left in no apparent distress in bed  [X] Call bell left within reach  [ ] Nursing notified  [ ] Caregiver present  [ ] Bed alarm activated      Neena Wilson PTA   Time Calculation: 20 mins Attending Only

## 2019-08-07 ENCOUNTER — OFFICE VISIT (OUTPATIENT)
Dept: CARDIOLOGY CLINIC | Age: 69
End: 2019-08-07

## 2019-08-07 VITALS
BODY MASS INDEX: 35.61 KG/M2 | HEIGHT: 68 IN | OXYGEN SATURATION: 98 % | SYSTOLIC BLOOD PRESSURE: 132 MMHG | WEIGHT: 235 LBS | HEART RATE: 57 BPM | DIASTOLIC BLOOD PRESSURE: 84 MMHG

## 2019-08-07 DIAGNOSIS — R00.2 PALPITATIONS: Primary | ICD-10-CM

## 2019-08-07 DIAGNOSIS — E11.21 TYPE 2 DIABETES MELLITUS WITH NEPHROPATHY (HCC): ICD-10-CM

## 2019-08-07 DIAGNOSIS — E78.5 DYSLIPIDEMIA: ICD-10-CM

## 2019-08-07 DIAGNOSIS — R07.89 OTHER CHEST PAIN: ICD-10-CM

## 2019-08-07 DIAGNOSIS — K21.9 GASTROESOPHAGEAL REFLUX DISEASE, ESOPHAGITIS PRESENCE NOT SPECIFIED: ICD-10-CM

## 2019-08-07 NOTE — PROGRESS NOTES
HPI:  I saw Neto Leal in my office today in cardiovascular evaluation regarding her past problems with atypical anginal chest discomfort and some palpitations in the past . Ms. Leal is a pleasant, very anxious, 74 year old [facility] American female with history of significant chest discomforts over the years, which have been felt to be related to gastroesophageal reflux disease and some to musculoskeletal issues. She did have a nuclear myocardial perfusion study for these atypical non anginal symptoms back in 2009, which was completely within normal limits. She comes in today and relates that she is due reasonably well although she is having some palpitations which she describes as a heart beating in her heart for a few seconds at a time maybe once or twice a month. She is really not having any other cardiovascular symptomatology, although she does sleep with her head up a little bit at night for comfort. Encounter Diagnoses   Name Primary?  Chest pain, atypical for angina     Palpitations Yes    Dyslipidemia     Type 2 diabetes mellitus with nephropathy (HCC)     Gastroesophageal reflux disease, esophagitis presence not specified        Discussion: This lady appears to be doing about as well as we could expect and really have no recommendations for change at this time. She is not having any further atypical anginal chest discomfort issues and her palpitations are rather infrequent and short-lived so I do not feel that further work-up of this problem is necessary. She does have a dyslipidemia, but she does not have significant risk factors and I would leave management of that problem to Dr. Percy Nagy. She in the past has had some problems with statin drugs so if her cholesterol is significantly elevated I would consider possibly using Zetia initially, but as indicated above I will leave that the Dr. Percy Nagy.     Her blood pressure is adequately controlled today and her EKG remains stable so I will plan to simply see her in a year or as needed if any new cardiovascular symptoms surface.     PCP: Yenny Gonzalez MD      Past Medical History:   Diagnosis Date    Breast cyst 1994    benign, removed    Deviated septum 04/2010    septoplasty    Diabetes mellitus     Type 2    GERD (gastroesophageal reflux disease)     severe    Hypertension     Hypothyroidism     Other elective surgery July 2008    toe surgery    Pure hypercholesterolemia     Stress thallium 09/30/2009    No evidence of ischemia or infarction; EF 78%; EKG portion negative with exercise capacity below average for age at 6 min of exercise    Varicose veins     closure in right leg 2008 & left leg 2007    Venous reflux study 06/24/2014    [facility]. Past Surgical History:   Procedure Laterality Date    CYSTOTOMY,EXCIS BLADDER TUMOR  2006    HX HYSTERECTOMY      HX KNEE REPLACEMENT Right     HX PARTIAL HYSTERECTOMY  1994    MT EGD DELIVER THERMAL ENERGY SPHNCTR/CARDIA GERD      THYROIDECTOMY  1992     Current Outpatient Medications   Medication Sig    metoprolol succinate (TOPROL-XL) 100 mg tablet TAKE ONE TABLET BY MOUTH DAILY    cholecalciferol (VITAMIN D3) 50,000 unit capsule TAKE 1 CAPSULE BY MOUTH ONCE WEEKLY    cetirizine (ZYRTEC) 10 mg tablet Take 10 mg by mouth daily.  escitalopram oxalate (LEXAPRO) 10 mg tablet Take 10 mg by mouth daily.  pantoprazole (PROTONIX) 40 mg tablet Take 40 mg by mouth two (2) times a day.  metformin (GLUCOPHAGE) 500 mg tablet Take 500 mg by mouth daily (with breakfast).  levothyroxine (LEVOXYL) 88 mcg tablet Take 88 mcg by mouth daily. No current facility-administered medications for this visit.             Allergies   Allergen Reactions    Pcn [Penicillins] Unknown (comments)         Social History:   Social History     Tobacco Use    Smoking status: Never Smoker    Smokeless tobacco: Never Used   Substance Use Topics    Alcohol use: No         Family history: family history includes Heart Attack in her maternal grandmother; Hypertension in her mother. Review of Systems:    Constitutional: Negative. Respiratory: Negative for cough, hemoptysis, shortness of breath and wheezing. Cardiovascular: Positive for palpitations and orthopnea. Negative for chest pain and leg swelling. Gastrointestinal: Positive for heartburn. Negative for abdominal pain, blood in stool, constipation, diarrhea, melena, nausea and vomiting. Musculoskeletal: Positive for joint pain. Negative for falls and myalgias. Neurological: Negative for dizziness. Physical Exam:   The patient is an alert, oriented, well developed, well nourished 79 y.o.  female who was in no acute distress at the time of my examination. Visit Vitals  /84 (BP 1 Location: Left arm, BP Patient Position: Sitting)   Pulse (!) 57   Ht 5' 8\" (1.727 m)   Wt 106.6 kg (235 lb)   SpO2 98%   BMI 35.73 kg/m²      BP Readings from Last 3 Encounters:   08/07/19 132/84   12/06/18 136/64   08/24/18 177/86        Wt Readings from Last 3 Encounters:   08/07/19 106.6 kg (235 lb)   12/06/18 103.5 kg (228 lb 3.2 oz)   08/24/18 103.4 kg (228 lb)       HEENT: Conjunctivae white, mucosa moist, no pallor or cyanosis. Neck: Supple without masses, tenderness or thyromegaly. No jugular venous distention. Carotid upstrokes are full bilaterally, without bruits. Cardiovascular: Chest is symmetrical with good excursion. There is some mild tenderness to palpation over the fifth left costochondral junction parasternally. [place] is not displaced. No lifts, heaves or thrills. S1 and S2 are normal, with a soft grade I-II/VI FRANKLYN along the LSB, with radiation to the base, without appreciable rubs, clicks or gallops. Lungs: Clear to auscultation in all fields. Abdomen: Soft. No masses or organomegaly. There is some epigastric tenderness.   Extremities: No edema with full peripheral pulses. Review of Data: See PMH and Cardiology and Imaging sections for cardiac testing      Results for orders placed or performed in visit on 08/07/19   AMB POC EKG ROUTINE W/ 12 LEADS, INTER & REP     Status: None    Narrative    Sinus bradycardia, rate 57. This tracing is within normal limits and similar to the EKG of August 20, 2018. Robert Mann D.O., GABRIELLAC. Cardiovascular Specialists  [facility] [place]  [address] [place] [address]. [address]    PLEASE NOTE:  This document has been produced using voice recognition software. Unrecognized errors in transcription may be present.

## 2019-08-08 ENCOUNTER — HOSPITAL ENCOUNTER (OUTPATIENT)
Dept: LAB | Age: 69
Discharge: HOME | End: 2019-08-08

## 2019-08-08 LAB — SENTARA SPECIMEN COL,SENBCF: NORMAL

## 2019-08-08 PROCEDURE — 99001 SPECIMEN HANDLING PT-LAB: CPT

## 2019-08-22 ENCOUNTER — HOSPITAL ENCOUNTER (OUTPATIENT)
Dept: LAB | Age: 69
Discharge: HOME | End: 2019-08-22

## 2019-08-22 LAB — SENTARA SPECIMEN COL,SENBCF: NORMAL

## 2019-08-22 PROCEDURE — 99001 SPECIMEN HANDLING PT-LAB: CPT

## 2019-09-09 RX ORDER — METOPROLOL SUCCINATE 100 MG/1
TABLET, EXTENDED RELEASE ORAL
Qty: 30 TAB | Refills: 11 | Status: SHIPPED | OUTPATIENT
Start: 2019-09-09 | End: 2020-09-11

## 2020-03-07 ENCOUNTER — HOSPITAL ENCOUNTER (OUTPATIENT)
Dept: LAB | Age: 70
Discharge: HOME | End: 2020-03-07

## 2020-03-07 LAB — SENTARA SPECIMEN COL,SENBCF: NORMAL

## 2020-03-07 PROCEDURE — 99001 SPECIMEN HANDLING PT-LAB: CPT

## 2020-08-05 ENCOUNTER — OFFICE VISIT (OUTPATIENT)
Dept: CARDIOLOGY CLINIC | Age: 70
End: 2020-08-05

## 2020-08-05 VITALS
BODY MASS INDEX: 35.01 KG/M2 | HEART RATE: 64 BPM | OXYGEN SATURATION: 98 % | SYSTOLIC BLOOD PRESSURE: 124 MMHG | DIASTOLIC BLOOD PRESSURE: 82 MMHG | HEIGHT: 68 IN | WEIGHT: 231 LBS

## 2020-08-05 DIAGNOSIS — R00.2 PALPITATIONS: ICD-10-CM

## 2020-08-05 DIAGNOSIS — R07.89 OTHER CHEST PAIN: Primary | ICD-10-CM

## 2020-08-05 DIAGNOSIS — E78.5 DYSLIPIDEMIA: ICD-10-CM

## 2020-08-05 DIAGNOSIS — K21.9 GASTROESOPHAGEAL REFLUX DISEASE, ESOPHAGITIS PRESENCE NOT SPECIFIED: ICD-10-CM

## 2020-08-05 DIAGNOSIS — E11.21 TYPE 2 DIABETES MELLITUS WITH NEPHROPATHY (HCC): ICD-10-CM

## 2020-08-05 RX ORDER — TELMISARTAN 80 MG/1
80 TABLET ORAL DAILY
COMMUNITY
Start: 2020-07-24

## 2020-08-05 NOTE — PROGRESS NOTES
HPI:  I saw Love Leal in my office today in cardiovascular evaluation regarding her past problems with atypical anginal chest discomfort and some palpitations in the past . Ms. Leal is a pleasant, very anxious, 81 year old [facility] American female with history of significant chest discomforts over the years, which have been felt to be related to gastroesophageal reflux disease and some to musculoskeletal issues. She did have a nuclear myocardial perfusion study for these atypical non anginal symptoms back in 2009, which was completely within normal limits. She comes in today and relates that she is due reasonably well although she is having some palpitations which she describes as a heart beating in her heart for a few seconds at a time maybe once or twice a month. She is really not having any other cardiovascular symptomatology, although she does sleep with her head up a little bit at night for comfort. She also admits to rare palpitations described as a brief fluttering in her chest for seconds. Encounter Diagnoses   Name Primary?  Chest pain, atypical for angina Yes    Palpitations     Dyslipidemia     Type 2 diabetes mellitus with nephropathy (HCC)     Gastroesophageal reflux disease, esophagitis presence not specified        Discussion: This lady appears to be doing about as well as we could expect and really have no recommendations for change at this time. She is not having any further atypical anginal chest discomfort issues and her palpitations are rather infrequent and short-lived so I do not feel that further work-up of this problem is necessary. She does have a dyslipidemia, but she does not have significant risk factors and I would leave management of that problem to Dr. Rogelia Lesches.   She in the past has had some problems with statin drugs so if her cholesterol is significantly elevated I would consider possibly using Zetia initially, but as indicated above I will leave that the  Huber. However, we did discuss a coronary calcium score and this might be something that she may want to get completed to get a better idea as to her long-term risk and how important it is for us to treat her cholesterol moving forward. Her blood pressure is well controlled today and her EKG remains stable, so I will plan to simply see her in a year or as needed if any new cardiovascular symptoms surface.     PCP: Brooke Tan MD      Past Medical History:   Diagnosis Date    Breast cyst 1994    benign, removed    Deviated septum 04/2010    septoplasty    Diabetes mellitus     Type 2    GERD (gastroesophageal reflux disease)     severe    Hypertension     Hypothyroidism     Other elective surgery July 2008    toe surgery    Pure hypercholesterolemia     Stress thallium 09/30/2009    No evidence of ischemia or infarction; EF 78%; EKG portion negative with exercise capacity below average for age at 6 min of exercise    Varicose veins     closure in right leg 2008 & left leg 2007    Venous reflux study 06/24/2014    No DVT. [facility] occlusions of bilateral GSV. Past Surgical History:   Procedure Laterality Date    CYSTOTOMY,EXCIS BLADDER TUMOR  2006    HX HYSTERECTOMY      HX KNEE REPLACEMENT Right     HX PARTIAL HYSTERECTOMY  1994    [place] EGD DELIVER THERMAL ENERGY SPHNCTR/CARDIA GERD      THYROIDECTOMY  1992     Current Outpatient Medications   Medication Sig    telmisartan (MICARDIS) 80 mg tablet TAKE 1 TABLET BY MOUTH EVERY DAY    metoprolol succinate (TOPROL-XL) 100 mg tablet TAKE 1 TABLET BY MOUTH EVERY DAY    cholecalciferol (VITAMIN D3) 50,000 unit capsule TAKE 1 CAPSULE BY MOUTH ONCE WEEKLY    cetirizine (ZYRTEC) 10 mg tablet Take 10 mg by mouth daily.  escitalopram oxalate (LEXAPRO) 10 mg tablet Take 5 mg by mouth daily.  pantoprazole (PROTONIX) 40 mg tablet Take 40 mg by mouth two (2) times a day.     metformin (GLUCOPHAGE) 500 mg tablet Take 500 mg by mouth daily (with breakfast).  levothyroxine (LEVOXYL) 88 mcg tablet Take 88 mcg by mouth daily. No current facility-administered medications for this visit. Allergies   Allergen Reactions    Pcn [Penicillins] Unknown (comments)         Social History:   Social History     Tobacco Use    Smoking status: Never Smoker    Smokeless tobacco: Never Used   Substance Use Topics    Alcohol use: No         Family history: family history includes Heart Attack in her maternal grandmother; Hypertension in her mother. Review of Systems:    Constitutional: Negative. Respiratory: Negative for cough, hemoptysis, shortness of breath and wheezing. Cardiovascular: Positive for palpitations and orthopnea and occasional leg swelling. Negative for chest pain. .   Gastrointestinal: Positive for heartburn. Negative for abdominal pain, blood in stool, constipation, diarrhea, melena, nausea and vomiting. Musculoskeletal: Positive for joint pain. Negative for falls and myalgias. Neurological: Negative for dizziness. Physical Exam:   The patient is an alert, oriented, well developed, well nourished 81 y.o.  female who was in no acute distress at the time of my examination. Visit Vitals  /82   Pulse 64   Ht 5' 8\" (1.727 m)   Wt 231 lb (104.8 kg)   SpO2 98%   BMI 35.12 kg/m²      BP Readings from Last 3 Encounters:   08/05/20 124/82   08/07/19 132/84   12/06/18 136/64        Wt Readings from Last 3 Encounters:   08/05/20 231 lb (104.8 kg)   08/07/19 235 lb (106.6 kg)   12/06/18 228 lb 3.2 oz (103.5 kg)       HEENT: Conjunctivae white, mucosa moist, no pallor or cyanosis. Neck: Supple without masses, tenderness or thyromegaly. No jugular venous distention. Carotid upstrokes are full bilaterally, without bruits. Cardiovascular: Chest is symmetrical with good excursion. There is some mild tenderness to palpation over the fifth left costochondral junction parasternally.  [place] is not displaced. No lifts, heaves or thrills. S1 and S2 are normal, with a soft grade I-II/VI FRANK[place]N along the LSB, with radiation to the base, without appreciable rubs, clicks or gallops. Lungs: Clear to auscultation in all fields. Abdomen: Soft. No masses or organomegaly. There is some epigastric tenderness. Extremities: No edema with full peripheral pulses. Review of Data: See PMH and Cardiology and Imaging sections for cardiac testing      Results for orders placed or performed in visit on 08/07/19   AMB POC EKG ROUTINE W/ 12 LEADS, INTER & REP     Status: None    Narrative    Sinus bradycardia, rate 57. This tracing is within normal limits and similar to the EKG of August 20, 2018. Rebecca Braga D.O., F.A.C.C. Cardiovascular Specialists  [facility] [place]  [address] [place] [address]. [address]    PLEASE NOTE:  This document has been produced using voice recognition software. Unrecognized errors in transcription may be present.

## 2020-08-05 NOTE — PATIENT INSTRUCTIONS
If you have not heard from the central scheduler to schedule your testing in 48 hours, please call [phone].

## 2020-08-13 ENCOUNTER — HOSPITAL ENCOUNTER (OUTPATIENT)
Dept: CT IMAGING | Age: 70
Discharge: HOME | End: 2020-08-13
Attending: INTERNAL MEDICINE

## 2020-08-13 DIAGNOSIS — R07.89 OTHER CHEST PAIN: ICD-10-CM

## 2020-08-13 PROCEDURE — 75571 CT HRT W/O DYE W/CA TEST: CPT

## 2020-08-17 ENCOUNTER — TELEPHONE (OUTPATIENT)
Dept: CARDIOLOGY CLINIC | Age: 70
End: 2020-08-17

## 2020-08-17 NOTE — TELEPHONE ENCOUNTER
I called and placed a message on the patient's answering machine for her to call for the report of her coronary calcium score if she had not already heard about the score which was 0. This would suggest that she has a very low risk of myocardial infarction probably less than 1% in the next 10 years. Please let her know if she calls.  ES

## 2020-08-17 NOTE — TELEPHONE ENCOUNTER
----- Message from Melvi Call LPN sent at 6/38/4703  9:10 AM EDT -----  Per your last note - She does have a dyslipidemia, but she does not have significant risk factors and I would leave management of that problem to Dr. Harry Sellers. She in the past has had some problems with statin drugs so if her cholesterol is significantly elevated I would consider possibly using Zetia initially, but as indicated above I will leave that the Dr. Harry Sellers. However, we did discuss a coronary calcium score and this might be something that she may want to get completed to get a better idea as to her long-term risk and how important it is for us to treat her cholesterol moving forward.

## 2020-09-11 RX ORDER — METOPROLOL SUCCINATE 100 MG/1
TABLET, EXTENDED RELEASE ORAL
Qty: 90 TAB | Refills: 3 | Status: SHIPPED | OUTPATIENT
Start: 2020-09-11

## 2020-09-28 ENCOUNTER — OFFICE VISIT (OUTPATIENT)
Dept: ORTHOPEDIC SURGERY | Age: 70
End: 2020-09-28
Payer: COMMERCIAL

## 2020-09-28 VITALS
OXYGEN SATURATION: 97 % | SYSTOLIC BLOOD PRESSURE: 135 MMHG | TEMPERATURE: 97.7 F | RESPIRATION RATE: 16 BRPM | DIASTOLIC BLOOD PRESSURE: 65 MMHG | BODY MASS INDEX: 34.89 KG/M2 | WEIGHT: 230.2 LBS | HEIGHT: 68 IN | HEART RATE: 59 BPM

## 2020-09-28 DIAGNOSIS — M54.50 LUMBAR PAIN: ICD-10-CM

## 2020-09-28 DIAGNOSIS — M54.2 NECK PAIN: Primary | ICD-10-CM

## 2020-09-28 DIAGNOSIS — M62.838 MUSCLE SPASM: ICD-10-CM

## 2020-09-28 DIAGNOSIS — M47.816 LUMBAR FACET ARTHROPATHY: ICD-10-CM

## 2020-09-28 DIAGNOSIS — M51.36 DDD (DEGENERATIVE DISC DISEASE), LUMBAR: ICD-10-CM

## 2020-09-28 PROCEDURE — 99203 OFFICE O/P NEW LOW 30 MIN: CPT | Performed by: PHYSICAL MEDICINE & REHABILITATION

## 2020-09-28 PROCEDURE — 72110 X-RAY EXAM L-2 SPINE 4/>VWS: CPT | Performed by: PHYSICAL MEDICINE & REHABILITATION

## 2020-09-28 PROCEDURE — 72040 X-RAY EXAM NECK SPINE 2-3 VW: CPT | Performed by: PHYSICAL MEDICINE & REHABILITATION

## 2020-09-28 NOTE — PATIENT INSTRUCTIONS

## 2020-09-28 NOTE — LETTER
9/29/20 Patient: Kinza Vargas YOB: 1950 Date of Visit: 9/28/2020 Marielle Dumont MD 
[address] [place] [address] [address] [address] VIA Facsimile: [phone] Dear Marielle Dumont MD, Thank you for referring Ms. Miranda Crane to [facility] for evaluation. My notes for this consultation are attached. If you have questions, please do not hesitate to call me. I look forward to following your patient along with you. Sincerely, Luan Arizmendi MD

## 2020-09-28 NOTE — PROGRESS NOTES
Mari Cheek presents today for   Chief Complaint   Patient presents with    Neck Pain    LOW BACK PAIN    New Patient     referred by Dr. Monisha Archer       Is someone accompanying this pt? no    Is the patient using any DME equipment during OV? Yes, Single Point Cane    Depression Screening:  3 most recent PHQ Screens 9/28/2020   PHQ Not Done -   Little interest or pleasure in doing things Not at all   Feeling down, depressed, irritable, or hopeless Not at all   Total Score PHQ 2 0       Learning Assessment:  Learning Assessment 5/23/2017   PRIMARY LEARNER Patient   CO-LEARNER CAREGIVER No   PRIMARY LANGUAGE ENGLISH   LEARNER PREFERENCE PRIMARY READING   ANSWERED BY patient   RELATIONSHIP SELF       Abuse Screening:  Abuse Screening Questionnaire 9/28/2020   Do you ever feel afraid of your partner? N   Are you in a relationship with someone who physically or mentally threatens you? N   Is it safe for you to go home? Y       Fall Risk  Fall Risk Assessment, last 12 mths 9/28/2020   Able to walk? Yes   Fall in past 12 months? No   Fall with injury? -   Number of falls in past 12 months -   Fall Risk Score -       OPIOID RISK TOOL  No flowsheet data found. Pt currently taking Antiplatelet therapy? no    Coordination of Care:  1. Have you been to the ER, urgent care clinic since your last visit? no  Hospitalized since your last visit? no    2. Have you seen or consulted any other health care providers outside of the [address] [place] [address] since your last visit? No Include any pap smears or colon screening.  no
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
## 2020-09-29 ENCOUNTER — TELEPHONE (OUTPATIENT)
Dept: ORTHOPEDIC SURGERY | Age: 70
End: 2020-09-29

## 2020-09-29 NOTE — TELEPHONE ENCOUNTER
Patient informed that ice is preferred during the first 24-48 hours after an acute injury but after that ice is preferable but heat also could be used, what is can tolerate.

## 2020-10-05 DIAGNOSIS — M54.50 LUMBAR PAIN: ICD-10-CM

## 2020-10-05 DIAGNOSIS — M47.816 LUMBAR FACET ARTHROPATHY: ICD-10-CM

## 2020-10-05 DIAGNOSIS — M51.36 DDD (DEGENERATIVE DISC DISEASE), LUMBAR: ICD-10-CM

## 2020-10-05 DIAGNOSIS — M62.838 MUSCLE SPASM: ICD-10-CM

## 2020-10-21 ENCOUNTER — TELEPHONE (OUTPATIENT)
Dept: ORTHOPEDIC SURGERY | Age: 70
End: 2020-10-21

## 2020-10-21 NOTE — TELEPHONE ENCOUNTER
Patient called and said that she was unable to get the mri done for the Lumbar Spine that  wanted her to have. Patient is requesting to be set up for an Open Mri. Patient tel. [phone].

## 2020-10-29 NOTE — TELEPHONE ENCOUNTER
Our request for a MRI of the Lumbar Spine without contrast has been faxed to [address] for scheduling, [phone], fax [phone]. The [place] pre-authorization has been submitted.

## 2021-01-29 ENCOUNTER — OFFICE VISIT (OUTPATIENT)
Dept: CARDIOLOGY CLINIC | Age: 71
End: 2021-01-29
Payer: COMMERCIAL

## 2021-01-29 VITALS
DIASTOLIC BLOOD PRESSURE: 62 MMHG | SYSTOLIC BLOOD PRESSURE: 142 MMHG | BODY MASS INDEX: 36.04 KG/M2 | OXYGEN SATURATION: 99 % | WEIGHT: 237 LBS | HEART RATE: 66 BPM

## 2021-01-29 DIAGNOSIS — M79.89 SWELLING OF BOTH LOWER EXTREMITIES: Primary | ICD-10-CM

## 2021-01-29 PROCEDURE — 99214 OFFICE O/P EST MOD 30 MIN: CPT | Performed by: INTERNAL MEDICINE

## 2021-01-29 RX ORDER — LEVOCETIRIZINE DIHYDROCHLORIDE 5 MG/1
TABLET, FILM COATED ORAL
COMMUNITY

## 2021-01-29 NOTE — PROGRESS NOTES
Abbie Mendoza presents today for NP. Abbie Mendoza preferred language for health care discussion is english/other. Personal Protective Equipment:   Personal Protective Equipment was used including: mask-surgical and hands-gloves. Patient was placed on no precaution(s). Patient was masked. Is someone accompanying this pt? Yes     Is the patient using any DME equipment during [address]? no    Depression Screening:  3 most recent PHQ Screens 9/28/2020   PHQ Not Done -   Little interest or pleasure in doing things Not at all   Feeling down, depressed, irritable, or hopeless Not at all   Total Score PHQ 2 0       Learning Assessment:  Learning Assessment 5/23/2017   PRIMARY LEARNER Patient   CO-LEARNER CAREGIVER No   PRIMARY LANGUAGE ENGLISH   LEARNER PREFERENCE PRIMARY READING   ANSWERED BY patient   RELATIONSHIP SELF       Abuse Screening:  Abuse Screening Questionnaire 9/28/2020   Do you ever feel afraid of your partner? N   Are you in a relationship with someone who physically or mentally threatens you? N   Is it safe for you to go home? Y       Fall Risk  Fall Risk Assessment, last 12 mths 9/28/2020   Able to walk? Yes   Fall in past 12 months? No   Number of falls in past 12 months -   Fall with injury? -       Pt currently taking Anticoagulant therapy? No    Coordination of Care:  1. Have you been to the ER, urgent care clinic since your last visit? Hospitalized since your last visit? No    2. Have you seen or consulted any other health care providers outside of the [address] [place] [address] since your last visit? Include any pap smears or colon screening.  No

## 2021-01-31 NOTE — PROGRESS NOTES
Bernardino Sanchez is in the office today in cardiovascular evaluation for follow-up of her atypical anginal chest discomfort and some palpitations. She has a history of  chest discomfort over the years, which has been felt to be related to gastroesophageal reflux disease and  musculoskeletal issues. She did have a nuclear myocardial perfusion study evaluation of her chest pain in 2009. The stress test  was normal.    In the office today, she reports that the chest discomfort that she experiences has been there for \"forever \". She localizes the discomfort to the lower sternal area. She says it feels like a \"thumping \". She has had no shortness of breath. She denies dyspnea on exertion. She has had rare dizziness with no near syncope or syncope. She has had no peripheral swelling. She expresses some desire to wean off of her Lexapro. Encounter Diagnosis   Name Primary?  Swelling of both lower extremities Yes       Plan; at the present time, her symptoms seem to be similar to what she has described over the years. I am not inclined to order any additional testing at this time. I do feel that she should be more physically active and that she might benefit from that increase in activity. Plan to back see her back in 1 year     PCP: Thierry Marshall MD      Past Medical History:   Diagnosis Date    Breast cyst 1994    benign, removed    Deviated septum 04/2010    septoplasty    Diabetes mellitus     Type 2    GERD (gastroesophageal reflux disease)     severe    Hypertension     Hypothyroidism     Other elective surgery July 2008    toe surgery    Pure hypercholesterolemia     Stress thallium 09/30/2009    No evidence of ischemia or infarction; EF 78%; EKG portion negative with exercise capacity below average for age at 6 min of exercise    Varicose veins     closure in right leg 2008 & left leg 2007    Venous reflux study 06/24/2014    No DVT. [facility] occlusions of bilateral GSV.          Past Surgical History:   Procedure Laterality Date    HX HYSTERECTOMY      HX KNEE REPLACEMENT Right     HX PARTIAL HYSTERECTOMY  1994    MN CYSTOTOMY,EXCIS BLADDER TUMOR  2006    MN EGD DELIVER THERMAL ENERGY SPHNCTR/CARDIA GERD      MN THYROIDECTOMY  1992     Current Outpatient Medications   Medication Sig    levocetirizine (XYZAL) 5 mg tablet Take  by mouth.  ECHINACEA PO Take  by mouth.  metoprolol succinate (TOPROL-XL) 100 mg tablet TAKE 1 TABLET BY MOUTH EVERY DAY    telmisartan (MICARDIS) 80 mg tablet Take 80 mg by mouth daily.  cholecalciferol (VITAMIN D3) 50,000 unit capsule TAKE 1 CAPSULE BY MOUTH ONCE WEEKLY    escitalopram oxalate (LEXAPRO) 10 mg tablet Take 5 mg by mouth daily.  pantoprazole (PROTONIX) 40 mg tablet Take 40 mg by mouth two (2) times a day.  metformin (GLUCOPHAGE) 500 mg tablet Take 500 mg by mouth daily (with breakfast).  levothyroxine (LEVOXYL) 88 mcg tablet Take 88 mcg by mouth daily. No current facility-administered medications for this visit. Allergies   Allergen Reactions    Pcn [Penicillins] Unknown (comments)         Social History:   Social History     Tobacco Use    Smoking status: Never Smoker    Smokeless tobacco: Never Used   Substance Use Topics    Alcohol use: No         Family history: family history includes Heart Attack in her maternal grandmother; Hypertension in her mother. Review of Systems:    Constitutional: Negative. Respiratory: Negative for cough, hemoptysis, shortness of breath and wheezing. Cardiovascular: Positive for palpitations and orthopnea and occasional leg swelling. Negative for chest pain. .   Gastrointestinal: Positive for heartburn. Negative for abdominal pain, blood in stool, constipation, diarrhea, melena, nausea and vomiting. Musculoskeletal: Positive for back pain  Neurological: Negative for dizziness. Physical Exam:   The patient is an alert, oriented.     Visit Vitals  BP (!) 142/62   Pulse 66 Wt 237 lb (107.5 kg)   SpO2 99%   BMI 36.04 kg/m²      BP Readings from Last 3 Encounters:   01/29/21 (!) 142/62   09/28/20 135/65   08/05/20 124/82        Wt Readings from Last 3 Encounters:   01/29/21 237 lb (107.5 kg)   09/28/20 230 lb 3.2 oz (104.4 kg)   08/05/20 231 lb (104.8 kg)       HEENT: Conjunctivae white, mucosa moist, no pallor or cyanosis. Neck: Supple without masses, tenderness or thyromegaly. No jugular venous distention. Carotid upstrokes are  without bruits. Cardiovascular: Chest is symmetrical with good excursion. There is some mild tenderness to palpation over the fifth left costochondral junction parasternally. No lifts, heaves or thrills. S1 and S2 are normal, with a soft grade I-II/VI [place] along the LSB, with radiation to the base, without appreciable rubs, clicks or gallops. Lungs: Clear to auscultation in all fields. Abdomen: Soft. No masses or organomegaly. Extremities: No edema with full peripheral pulses. Review of Data: See PMH and Cardiology and Imaging sections for cardiac testing      Results for orders placed or performed in visit on 08/05/2020   AMB POC EKG ROUTINE W/ 12 LEADS, INTER & REP     Status: None    Narrative    Sinus rhythm, rate 64. Normal tracing       Duran Mccray MD, F.A.C.C. [facility]  [facility]  [facility] [address] [place]    PLEASE NOTE:  This document has been produced using voice recognition software. Unrecognized errors in transcription may be present.

## 2021-07-19 ENCOUNTER — OFFICE VISIT (OUTPATIENT)
Dept: CARDIOLOGY CLINIC | Age: 71
End: 2021-07-19
Payer: MEDICARE

## 2021-07-19 VITALS
HEIGHT: 68 IN | WEIGHT: 233 LBS | HEART RATE: 80 BPM | BODY MASS INDEX: 35.31 KG/M2 | DIASTOLIC BLOOD PRESSURE: 86 MMHG | SYSTOLIC BLOOD PRESSURE: 136 MMHG | OXYGEN SATURATION: 96 %

## 2021-07-19 DIAGNOSIS — R07.89 OTHER CHEST PAIN: Primary | ICD-10-CM

## 2021-07-19 PROCEDURE — G8427 DOCREV CUR MEDS BY ELIG CLIN: HCPCS | Performed by: INTERNAL MEDICINE

## 2021-07-19 PROCEDURE — G8536 NO DOC ELDER MAL SCRN: HCPCS | Performed by: INTERNAL MEDICINE

## 2021-07-19 PROCEDURE — 1090F PRES/ABSN URINE INCON ASSESS: CPT | Performed by: INTERNAL MEDICINE

## 2021-07-19 PROCEDURE — G8510 SCR DEP NEG, NO PLAN REQD: HCPCS | Performed by: INTERNAL MEDICINE

## 2021-07-19 PROCEDURE — 3017F COLORECTAL CA SCREEN DOC REV: CPT | Performed by: INTERNAL MEDICINE

## 2021-07-19 PROCEDURE — 1101F PT FALLS ASSESS-DOCD LE1/YR: CPT | Performed by: INTERNAL MEDICINE

## 2021-07-19 PROCEDURE — 99214 OFFICE O/P EST MOD 30 MIN: CPT | Performed by: INTERNAL MEDICINE

## 2021-07-19 PROCEDURE — G8400 PT W/DXA NO RESULTS DOC: HCPCS | Performed by: INTERNAL MEDICINE

## 2021-07-19 PROCEDURE — G8417 CALC BMI ABV UP PARAM F/U: HCPCS | Performed by: INTERNAL MEDICINE

## 2021-07-19 NOTE — PROGRESS NOTES
Connor Copeland presents today for   Chief Complaint   Patient presents with    Follow-up     6 month follow up, Per Dr. Chen Mckeon preferred language for health care discussion is english/other. Is someone accompanying this pt? no    Is the patient using any DME equipment during [address]? cane    Depression Screening:  3 most recent PHQ Screens 7/19/2021   PHQ Not Done -   Little interest or pleasure in doing things Not at all   Feeling down, depressed, irritable, or hopeless Not at all   Total Score PHQ 2 0       Learning Assessment:  Learning Assessment 7/19/2021   PRIMARY LEARNER Patient   CO-LEARNER CAREGIVER -   PRIMARY LANGUAGE ENGLISH   LEARNER PREFERENCE PRIMARY DEMONSTRATION   ANSWERED BY patient   RELATIONSHIP SELF       Abuse Screening:  Abuse Screening Questionnaire 7/19/2021   Do you ever feel afraid of your partner? N   Are you in a relationship with someone who physically or mentally threatens you? N   Is it safe for you to go home? Y       Fall Risk  Fall Risk Assessment, last 12 mths 7/19/2021   Able to walk? Yes   Fall in past 12 months? 0   Do you feel unsteady? 0   Are you worried about falling 0   Number of falls in past 12 months -   Fall with injury? -           Pt currently taking Anticoagulant therapy? no    Pt currently taking Antiplatelet therapy ? no      Coordination of Care:  1. Have you been to the ER, urgent care clinic since your last visit? Hospitalized since your last visit? no    2. Have you seen or consulted any other health care providers outside of the [address] [place] [address] since your last visit? Include any pap smears or colon screening.  no

## 2021-07-23 NOTE — PROGRESS NOTES
Yessenia Garcia is in the office today in cardiovascular evaluation for follow-up of her atypical  chest discomfort and palpitations. She has a history of  chest discomfort over the years, which has been felt to be related to gastroesophageal reflux disease and/or a musculoskeletal origin. She had a nuclear myocardial perfusion study evaluation of her chest pain in 2009. The stress test  was normal.    In the office on 1/29/2021, she reported that the chest discomfort that she experienced had been there for \"forever \". She localized the discomfort to the lower sternal area. She said it felt like a \"thumping \". She  had no shortness of breath. She denied dyspnea on exertion. She reported rare dizziness with no near syncope or syncope. She  had no peripheral swelling. She expressed some desire to wean off of her Lexapro. In the office today, she reports she is off of lorazepam but still has anxiety. A week prior to this evaluation she developed some upper sternal pressure type discomfort. It  occurred intermittently for the entire week. She said it \"felt like I was on fire\". She also said that she always has discomfort. She had some numbness in her hands. She has been tired and short of breath particular with walking. No diagnosis found. Plan; at the present time, her symptoms are similar to what she has described  over the years. I am not inclined to order any additional testing at this time. I reinforced that she might benefit from an increase in physical activity.   I informed her to call should she have any more concerning symptoms and that I would be happy to see her at any time at her request.     PCP: Christin Yang MD      Past Medical History:   Diagnosis Date    Breast cyst 1994    benign, removed    Deviated septum 04/2010    septoplasty    Diabetes mellitus     Type 2    GERD (gastroesophageal reflux disease)     severe    Hypertension     Hypothyroidism     Other elective surgery July 2008    toe surgery    Pure hypercholesterolemia     Stress thallium 09/30/2009    No evidence of ischemia or infarction; EF 78%; EKG portion negative with exercise capacity below average for age at 6 min of exercise    Varicose veins     closure in right leg 2008 & left leg 2007    Venous reflux study 06/24/2014    No DVT. [facility] occlusions of bilateral GSV. Past Surgical History:   Procedure Laterality Date    HX HYSTERECTOMY      HX KNEE REPLACEMENT Right     HX PARTIAL HYSTERECTOMY  1994    [place] CYSTOTOMY,EXCIS BLADDER TUMOR  2006    [place] EGD DELIVER THERMAL ENERGY SPHNCTR/CARDIA GERD      [place] THYROIDECTOMY  1992     Current Outpatient Medications   Medication Sig    levocetirizine (XYZAL) 5 mg tablet Take  by mouth.  ECHINACEA PO Take  by mouth.  metoprolol succinate (TOPROL-XL) 100 mg tablet TAKE 1 TABLET BY MOUTH EVERY DAY    telmisartan (MICARDIS) 80 mg tablet Take 80 mg by mouth daily.  cholecalciferol (VITAMIN D3) 50,000 unit capsule TAKE 1 CAPSULE BY MOUTH ONCE WEEKLY    escitalopram oxalate (LEXAPRO) 10 mg tablet Take 5 mg by mouth daily.  pantoprazole (PROTONIX) 40 mg tablet Take 40 mg by mouth two (2) times a day.  metformin (GLUCOPHAGE) 500 mg tablet Take 500 mg by mouth daily (with breakfast).  levothyroxine (LEVOXYL) 88 mcg tablet Take 88 mcg by mouth daily. No current facility-administered medications for this visit. Allergies   Allergen Reactions    Pcn [Penicillins] Unknown (comments)         Social History:   Social History     Tobacco Use    Smoking status: Never Smoker    Smokeless tobacco: Never Used   Substance Use Topics    Alcohol use: No         Family history: family history includes Heart Attack in her maternal grandmother; Hypertension in her mother. Review of Systems:    Constitutional: Negative. Respiratory: Negative for cough, hemoptysis, shortness of breath and wheezing.     Cardiovascular: Positive for palpitations and orthopnea and occasional leg swelling. Negative for chest pain. .   Gastrointestinal: Positive for heartburn. Negative for abdominal pain, blood in stool, constipation, diarrhea, melena, nausea and vomiting. Musculoskeletal: Positive for back pain  Neurological: Negative for dizziness. Physical Exam:   The patient is an alert, oriented. Visit Vitals  /86 (BP 1 Location: Left upper arm, BP Patient Position: Sitting, BP Cuff Size: Large adult)   Pulse 80   Ht 5' 8\" (1.727 m)   Wt 105.7 kg (233 lb)   SpO2 96%   BMI 35.43 kg/m²      BP Readings from Last 3 Encounters:   07/19/21 136/86   01/29/21 (!) 142/62   09/28/20 135/65        Wt Readings from Last 3 Encounters:   07/19/21 105.7 kg (233 lb)   01/29/21 107.5 kg (237 lb)   09/28/20 104.4 kg (230 lb 3.2 oz)       HEENT: Conjunctivae white, mucosa moist, no pallor or cyanosis. Neck: Supple without masses, tenderness or thyromegaly. No jugular venous distention. Carotid upstrokes are  without bruits. Cardiovascular: Chest is symmetrical with good excursion. There is some mild tenderness to palpation over the fifth left costochondral junction parasternally. No lifts, heaves or thrills. S1 and S2 are normal, with a soft grade I-II/VI [place] along the LSB, with radiation to the base, without appreciable rubs, clicks or gallops. Lungs: Clear to auscultation in all fields. Abdomen: Soft. No masses or organomegaly. Extremities: 1+ ankle edema with full peripheral pulses. Review of Data: See PMH and Cardiology and Imaging sections for cardiac testing      Results for orders placed or performed in visit on 08/05/2020   AMB POC EKG ROUTINE W/ 12 LEADS, INTER & REP     Status: None    Narrative    Sinus rhythm, rate 64. Normal tracing       Naima Duarte MD, F.A.C.C. [facility]  [facility]  [address] [place] [address].   [id number] Bekah Josselin    PLEASE NOTE: This document has been produced using voice recognition software. Unrecognized errors in transcription may be present.

## 2021-08-13 ENCOUNTER — TRANSCRIBE ORDER (OUTPATIENT)
Dept: SCHEDULING | Age: 71
End: 2021-08-13

## 2021-08-13 DIAGNOSIS — Z12.31 VISIT FOR SCREENING MAMMOGRAM: Primary | ICD-10-CM

## 2021-08-18 ENCOUNTER — HOSPITAL ENCOUNTER (OUTPATIENT)
Dept: MAMMOGRAPHY | Age: 71
Discharge: HOME | End: 2021-08-18
Attending: FAMILY MEDICINE
Payer: MEDICARE

## 2021-08-18 DIAGNOSIS — Z12.31 VISIT FOR SCREENING MAMMOGRAM: ICD-10-CM

## 2021-08-18 PROCEDURE — 77067 SCR MAMMO BI INCL CAD: CPT

## 2022-03-18 PROBLEM — M17.10 ARTHRITIS OF KNEE: Status: ACTIVE | Noted: 2017-09-18

## 2022-03-18 PROBLEM — Z86.79: Status: ACTIVE | Noted: 2017-05-23

## 2022-03-19 PROBLEM — E11.21 TYPE 2 DIABETES MELLITUS WITH NEPHROPATHY (HCC): Status: ACTIVE | Noted: 2018-01-23

## 2022-03-20 PROBLEM — Z82.49 FAMILY HX OF AORTIC ANEURYSM: Status: ACTIVE | Noted: 2017-05-23

## 2022-04-27 ENCOUNTER — HOSPITAL ENCOUNTER (EMERGENCY)
Age: 72
Discharge: HOME | End: 2022-04-27
Attending: STUDENT IN AN ORGANIZED HEALTH CARE EDUCATION/TRAINING PROGRAM
Payer: MEDICARE

## 2022-04-27 VITALS
WEIGHT: 225 LBS | TEMPERATURE: 97.9 F | SYSTOLIC BLOOD PRESSURE: 163 MMHG | RESPIRATION RATE: 19 BRPM | HEART RATE: 73 BPM | DIASTOLIC BLOOD PRESSURE: 71 MMHG | OXYGEN SATURATION: 96 % | BODY MASS INDEX: 35.31 KG/M2 | HEIGHT: 67 IN

## 2022-04-27 DIAGNOSIS — M25.561 ARTHRALGIA OF RIGHT LOWER LEG: Primary | ICD-10-CM

## 2022-04-27 PROCEDURE — 99282 EMERGENCY DEPT VISIT SF MDM: CPT

## 2022-04-27 NOTE — ED TRIAGE NOTES
Pt states her leg feels numb after a piece of frozen meat hits her right lower leg & foot. Pt states her leg feels numb all the time anyways since she had a geni put in her leg.

## 2022-04-27 NOTE — ED PROVIDER NOTES
HPI   Patient is a 81-year-old female who presents with a 1 day history of right leg pain. Patient states that she was at [facility] yesterday when a box got dropped on the anterior aspect of her mid upper shin and fell onto her foot. At the time she did not have any significant pain or discomfort. She has been able to ambulate move around however today she had some minor discomfort and then noticed a worsening tingling and numbness in her foot. She has had this issue before but it was worse today. She also has a history of knee surgery and has chronic numbness around her knee. Therefore she came in for evaluation. Denies any other trauma or injury. Denies any fever, sweats or chills. Does have a history of diabetes and neuropathy. Past Medical History:   Diagnosis Date    Breast cyst 1994    benign, removed    Deviated septum 04/2010    septoplasty    Diabetes mellitus     Type 2    GERD (gastroesophageal reflux disease)     severe    Hypertension     Hypothyroidism     Other elective surgery July 2008    toe surgery    Pure hypercholesterolemia     Stress thallium 09/30/2009    No evidence of ischemia or infarction; EF 78%; EKG portion negative with exercise capacity below average for age at 6 min of exercise    Varicose veins     closure in right leg 2008 & left leg 2007    Venous reflux study 06/24/2014    No DVT. [facility] occlusions of bilateral GSV.        Past Surgical History:   Procedure Laterality Date    HX HYSTERECTOMY      HX KNEE REPLACEMENT Right     HX PARTIAL HYSTERECTOMY  1994    [place] CYSTOTOMY,EXCIS BLADDER TUMOR  2006    [place] EGD DELIVER THERMAL ENERGY SPHNCTR/CARDIA GERD      [place] THYROIDECTOMY  1992         Family History:   Problem Relation Age of Onset    Heart Attack Maternal Grandmother     Hypertension Mother        Social History     Socioeconomic History    Marital status:      Spouse name: Not on file    Number of children: Not on file    Years of education: Not on file    Highest education level: Not on file   Occupational History    Not on file   Tobacco Use    Smoking status: Never Smoker    Smokeless tobacco: Never Used   Substance and Sexual Activity    Alcohol use: No    Drug use: No    Sexual activity: Not on file   Other Topics Concern    Not on file   Social History Narrative    Not on file     Social Determinants of Health     Financial Resource Strain:     Difficulty of Paying Living Expenses: Not on file   Food Insecurity:     Worried About Running Out of Food in the Last Year: Not on file    Ania of Food in the Last Year: Not on file   Transportation Needs:     Lack of Transportation (Medical): Not on file    Lack of Transportation (Non-Medical):  Not on file   Physical Activity:     Days of Exercise per Week: Not on file    Minutes of Exercise per Session: Not on file   Stress:     Feeling of Stress : Not on file   Social Connections:     Frequency of Communication with Friends and Family: Not on file    Frequency of Social Gatherings with Friends and Family: Not on file    Attends [employer] Services: Not on file    Active Member of [address] [place] [address] or Organizations: Not on file    Attends Club or Organization Meetings: Not on file    Marital Status: Not on file   Intimate Partner Violence:     Fear of Current or Ex-Partner: Not on file    Emotionally Abused: Not on file    Physically Abused: Not on file    Sexually Abused: Not on file   Housing Stability:     Unable to Pay for Housing in the Last Year: Not on file    Number of [place] in the Last Year: Not on file    Unstable Housing in the Last Year: Not on file         ALLERGIES: Pcn [penicillins]    Review of Systems  Constitutional: No fever  HENT: No ear pain  Eyes: No change in vision  Respiratory: No SOB  Cardio: No chest pain  GI: No blood in stool  : No hematuria  MSK: No back pain  Skin: No rashes  Neuro: No headache    Vitals:    04/27/22 0844   BP: (!) 163/71 Pulse: 73   Resp: 19   Temp: 97.9 °F (36.6 °C)   SpO2: 96%   Weight: 102.1 kg (225 lb)   Height: 5' 7\" (1.702 m)            Physical Exam   General: No acute distress  Head: Normocephalic, atraumatic  Psych: Cooperative and alert  Eyes: No scleral icterus, normal conjunctiva  ENT: Moist oral mucosa  Neck: Supple  CV: Regular rate and rhythm, palpable DP pulse  Pulm: Clear breath sounds bilaterally without any wheezing or rhonchi, normal respiratory rate  GI: Normal bowel  MSK: Normal range of motion of bilateral lower extremities, no specific bony process tenderness, able to actively straight leg raise, dorsiflex plantarflex and wiggle her toes  Skin: No rashes, abrasions or bruising  Neuro: Alert and conversive, sensation diminished to the medial aspect of the right knee    MDM   Patient is a 68-year-old female who presents with right leg pain and numbness. Patient is neurovascularly intact, clinically appears well does not have any concerning findings of trauma on exam.  She is able to ambulate and has no specific bony process tenderness therefore do not think x-rays would be helpful for this patient. She has a chronic patch of numbness that has been there since her surgery and states this is at her baseline. She is describing a chronic neuropathy is also not sniffily changed from her baseline. She does not have any neurovascular deficits associated with this. We discussed treatment    The importance of controlling her sugar. We discussed different regimens to use to help with her symptoms. Discussed that she is still able to weight-bear as tolerated. Patient stable for discharge at this time. Patient is in agreement with the plan to be discharged at this time. All the patient's questions were answered. Patient was given written instructions on the diagnosis, and states understanding of the plan moving forward.   We did discuss important signs and symptoms that should prompt quick return to the emergency department. Disposition: Patient was discharged home in stable condition.   They will follow up with their primary care physician    Prescriptions: None    Diagnosis: Acute leg pain  Neuropathy, chronic    Procedures

## 2022-04-27 NOTE — ED NOTES
[facility] Medicine  Progress Note    Patient Name: Forest Lopez  MRN: [id number]  Patient Class: IP- Inpatient   Admission Date: 1/31/2022  Length of Stay: 51 days  Attending Physician: Mojgan Bosch MD  Primary Care Provider: Julian Escobar        Subjective:     Principal Problem:Acute stroke due to ischemia        HPI:  Mr. Lopez is a 74 year old man who presented after a syncopal episode.  He reports he had been feeling well prior to the episode but then had a prodrome prior to passing out.  EMS was called, report patient's BP was low normal on their arrival, improved after an IV fluids bolus.  Patient denies chest pain or shortness of breath and says he does not feel weak currently although is rather tired.  When seen in the ED this morning he could barely express himself audibly.     Vital signs were normal on presentation here.  He is on warfarin for recurrent DVT, but his INR was 1, and d-dimer markedly elevated at 21.7.  Tox screen was positive for cocaine.  He had a CTA of the brain and neck done on presentation so CTA of the chest for PE study could not be done for 48 hours.  Ultrasound of the lower extremities showed extensive bilateral DVT.  On the right there was thrombosis of the common femoral vein, femoral vein, popliteal vein, one of the paired posterior tibial veins and both visualized peroneal veins.  On the left there was thrombosis of the common femoral vein, femoral vein and popliteal vein.  Patient was started on full dose Lovenox and admitted.    Medical history is from the chart as he is unable to give me much information.  It includes hypertension, hyperlipidemia, type II diabetes not on insulin, cocaine abuse, marijuana abuse, and lower extremities DVT x 3 on warfarin, stroke in 9/2019 and alcohol abuse.  He smokes 5-6 cigarettes/day and is not interested in quitting.  He is currently homeless and staying with a friend.  Despite the normal INR he is sure  Pt discharged at this time. This RN reviewed discharge instructions at this time with the pt, & pt does not have any questions. Pt ambulatory upon discharge, & in stable condition. Pt armband removed & shredded. he is taking his warfarin and is not having difficulty taking his medications.  I discussed his care with [facility] staff on the [facility] and patient had been lost to follow up since November 2021.      Overview/Hospital Course:  Patient presented to the ED after a syncopal episode and was found to have bilateral lower extremity DVTs and a low INR.  Tox screen was positive for cocaine.  MRI was done as part of his workup and showed multiple deep left sided infarctions and a small infarction of the right frontal lobe.  He was started back on his warfarin with bridging Lovenox.  His 2D echo showed grade I diastolic dysfunction.  Neurology evaluated him and recommended echo with bubble study, which was negative for a shunt.    PT/OT evaluated him and recommended SNF placement versus inpatient rehab.  His INR was difficult to get therapeutic, and as he had no contraindication to a NOAC his warfarin was changed to apixaban, and the full dose Lovenox was discontinued.  As he appeared to be depressed and had no objection to starting an antidepressant Lexapro was started.  He had a fall in the hospital on 2/13, had gone to the bathroom for a BM with the nurse, and when she turned away while he was washing his hands he apparently lost his balance and fell.  He did not hit his head and did not lose consciousness, but his BP was low transiently and he was given a bolus of IV fluids.    Patient's mental status improved slowly, but he gradually became more talkative and was able to hold a conversation.  He was diagnosed with a polymicrobial UTI on 2/21 and completed treatment with antibiotics.  Psychiatry evaluated him on 3/3 and changed his antidepressant to Prozac as it can be more activating and patient was having difficulty with his level of motivation.      Patient's discharge was delayed as he was not accepted by any SNF facilities in the area.  Reasons for the denials are unclear as he has a clear rehab diagnosis.  He has  a history of drug use but has not shown any interest in getting any illicit or legal drugs since he has been here, and he has had no visitors that could have provided drugs to him.  He has been pleasant and cooperative while here although lacks motivation, likely due to the nature of the stroke affecting his frontal lobe.  He requires considerable encouragement to participate in therapy.  Discharging him to live on the street or shelter would not be appropriate as he would not be able to take care of himself and likely will need snf nursing home care eventually after he gets the chance to improve with rehab.  As he has gradually improved here with therapy and has better participation he could benefit from consideration for inpatient rehab.      Interval History:  He did not want to drink the mag citrate, and colonoscopy was canceled again.  He states he is not nauseated any longer and has no bowel issues.  He had a visit from a rehab on the [facility] today and is interested in going there.    Review of Systems   Constitutional:  Negative for chills and fever.   Respiratory:  Negative for cough and shortness of breath.    Cardiovascular:  Negative for chest pain and palpitations.   Gastrointestinal:  Negative for abdominal pain, nausea and vomiting.   Objective:     Vital Signs (Most Recent):  Temp: 97.9 °F (36.6 °C) (03/23/22 1121)  Pulse: 71 (03/23/22 1632)  Resp: 16 (03/23/22 1632)  BP: 123/66 (03/23/22 1632)  SpO2: 96 % (03/23/22 1632) Vital Signs (24h Range):  Temp:  [97.9 °F (36.6 °C)-99.3 °F (37.4 °C)] 97.9 °F (36.6 °C)  Pulse:  [71-93] 71  Resp:  [16-18] 16  SpO2:  [96 %-99 %] 96 %  BP: (112-137)/(59-83) 123/66     Weight: 66.2 kg (145 lb 15.1 oz)  Body mass index is 19.79 kg/m².    Intake/Output Summary (Last 24 hours) at 3/23/2022 8843  Last data filed at 3/23/2022 0500  Gross per 24 hour   Intake --   Output 1400 ml   Net -1400 ml      Physical Exam  Constitutional:       General: He is not in  acute distress.     Comments: Thin   HENT:      Mouth/Throat:      Comments: Edentulous  Cardiovascular:      Rate and Rhythm: Normal rate and regular rhythm.      Pulses: Normal pulses.      Heart sounds: Normal heart sounds. No murmur heard.    No gallop.   Pulmonary:      Effort: Pulmonary effort is normal.      Breath sounds: Normal breath sounds.   Abdominal:      General: Bowel sounds are normal. There is no distension.      Palpations: Abdomen is soft.      Tenderness: There is no abdominal tenderness.   Skin:     General: Skin is warm and dry.   Neurological:      Mental Status: He is alert.      Comments: Not oriented to time.       Significant Labs: All pertinent labs within the past 24 hours have been reviewed.    Significant Imaging: I have reviewed all pertinent imaging results/findings within the past 24 hours.      Assessment/Plan:      * Acute stroke due to ischemia  - MRI showed areas of diffusion signal hyperintensity consistent with areas of acute ischemia/infarct involving the left cerebral hemisphere, the most prominent measuring approximately 1.4 cm, also a small focus of the right frontal lobe.  - Patient on full dose anticoagulation currently due to acute bilateral DVT.  - Sinus rhythm noted throughout hospital stay  - Risk factor modification - control diabetes, continue statin.  - RPR, HIV non reactive.  B12 low normal.   - CTA showed a focal segment of 60-70% stenosis involving the proximal to mid left vertebral artery that was new when compared to the prior study of 09/25/2019.  No evidence of large vessel intracranial occlusion.   - Neurology noted symptoms do not correspond to stroke location, although the frontal lobe stroke might have something to do with his lack of motivation, and Speech has noted he has had delayed swallowing.  - Echo with bubble study done, no shunt seen.  - Follow up with vascular neurology in 4 weeks.  - Therapy recommended rehab initially but changed  recommendation to SNF due to his low interest in participation.  - Started Lexapro due to suspicion for depression.  - Psych consulted, changed to Prozac and started thiamine, folic acid, MVI due to previous history of alcohol abuse, although Wernicke encephalopathy is not suspected.  - Re-consulted SNF for rehab from the stroke, as discharging him to a shelter would be inappropriate and unsafe for him.  Discussed with his son, Forest, at length on 3/10.  Forest is out of town on a job but will be returning to [place] eventually.  He agrees his father will eventually need nursing home care as he is unable to care for himself.  - SNF still denying patient.  Will pursue rehab placement.      Acute deep vein thrombosis (DVT) of both femoral veins  Last ultrasound showed partially occlusive DVTs, now completely occlusive DVT of multiple lower extremity veins on ultrasound.  Patient reports compliance with warfarin but his INR is only one.  He is homeless but says he does not have difficulty obtaining his medications.  Does not seem to care about anything, which again might be due to the frontal lobe stroke.  D-dimer was markedly elevated on presentation and there was a question of possible PE, but he had a CTA of the brain/neck on presentation so could not get another CTA for another 48 hours.    V/Q scan was done, showed low probability for PE.  2D echo showed grade I diastolic dysfunction.  Warfarin was restarted with bridging Lovenox, but INR was still low.  Given the difficulty of getting him to follow up changed to apixaban and discontinued Lovenox.    Advance care planning        Severe protein-calorie malnutrition  Diet as tolerated      Debility  PT/OT recommending rehab vs SNF after stroke with debility/poor motivation  Having difficulty finding placement for unclear reasons.  Will continue to pursue this as above.  Again, patient has been pleasant and cooperative.      Type 2 diabetes mellitus with  hyperglycemia, without long-term current use of insulin  Reportedly he is on metformin and glipizide, both held.  Continue SSI for now.  He has 4+ sugar in urine and HbA1c is 10.3, and he is hyperglycemic here.  Will start levemir and continue ISS, increase levemir to 25 units daily  Add scheduled novolog 5 units tid, increase to 7 units  Improving  OK to resume metformin.  Increased levemir to 28 units daily with good improvement.  Hold metformin with contrast with CT.  BG lower now off metformin, has had a few low BG as well.  Will decrease Levemir to 20 units daily and monitor.    Syncope and collapse  Unclear cause.  Spoke to [facility] and patient has had previous syncopal episodes attributed to alcohol abuse.  Has been prescribed meclizine as well.  Tox positive for cocaine but not alcohol, and he denies current use of both.  CTA brain/neck was done in ED, and per ED note the results were discussed with stroke neurologist Dr. Tovar who felt that CTA finding of a short segment of left vertebral artery stenosis was incidental.  He did not think stroke workup with MRI was indicated.    Since change of status per son compared to when he saw him early during the week a CT of the brain was ordered and no abnormalities seen.  MRI of the brain showed acute strokes of the left cerebral hemisphere and right frontal lobe.      Hyperlipidemia  Resumed lipitor      Essential hypertension  Doubtful he was on anything at home.  Started losartan 25 mg daily  BP well controlled on low dose losartan.  BP transiently low after he fell but recovered quickly.  IVF bolus given.  Losartan discontinued as BP persistently low-normal.    Tobacco dependence  He smokes 5-6 cigarettes/day.  Not trying to quit and not interested in a nicotine patch, but has been here more than a month without cigarettes and has not had any problems with stopping.      VTE Risk Mitigation (From admission, onward)         Ordered     Place sequential  compression device  Until discontinued         01/31/22 0533                Discharge Planning   EFRAÍN:      Code Status: Full Code   Is the patient medically ready for discharge?:     Reason for patient still in hospital (select all that apply): Pending disposition  Discharge Plan A: Rehab ([facility] accepted pending 3 rehab denials in [facility])   Discharge Delays: (!) Post-Acute Set-up (pending acceptance by post acute facility)              Mojgan Meza MD  Department of Hospital Medicine   [facility])

## 2022-12-06 ENCOUNTER — TRANSCRIBE ORDER (OUTPATIENT)
Dept: SCHEDULING | Age: 72
End: 2022-12-06

## 2022-12-06 DIAGNOSIS — S90.01XD CONTUSION OF RIGHT ANKLE, SUBSEQUENT ENCOUNTER: Primary | ICD-10-CM

## 2023-01-05 ENCOUNTER — HOSPITAL ENCOUNTER (OUTPATIENT)
Age: 73
End: 2023-01-05
Attending: ORTHOPAEDIC SURGERY
Payer: MEDICARE

## 2023-01-05 DIAGNOSIS — S90.01XD CONTUSION OF RIGHT ANKLE, SUBSEQUENT ENCOUNTER: ICD-10-CM

## 2023-01-05 PROCEDURE — 73721 MRI JNT OF LWR EXTRE W/O DYE: CPT

## 2023-01-10 RX ORDER — MELATONIN
DAILY
COMMUNITY

## 2023-01-10 RX ORDER — AMLODIPINE BESYLATE 10 MG/1
TABLET ORAL DAILY
COMMUNITY

## 2023-01-10 NOTE — PERIOP NOTES
PRE-SURGICAL INSTRUCTIONS        Patient's Name:  Jass Hyde      HPWSZ'C Date:  1/10/2023            Covid Testing Date and Time:    Surgery Date:  1/20/2023                Do NOT eat or drink anything, including candy, gum, or ice chips after midnight on 1/19/2023, unless you have specific instructions from your surgeon or anesthesia provider to do so. You may brush your teeth before coming to the hospital.  No smoking 24 hours prior to the day of surgery. No alcohol 24 hours prior to the day of surgery. No recreational drugs for one week prior to the day of surgery. Leave all valuables, including money/purse, at home. Remove all jewelry, nail polish, acrylic nails, and makeup (including mascara); no lotions powders, deodorant, or perfume/cologne/after shave on the skin. Follow instruction for Hibiclens washes and CHG wipes from surgeon's office. Glasses/contact lenses and dentures may be worn to the hospital.  They will be removed prior to surgery. Call your doctor if symptoms of a cold or illness develop within 24-48 hours prior to your surgery. 11.  If you are having an outpatient procedure, please make arrangements for a responsible ADULT TO [address] [place] [address] and stay with you for 24 hours after your surgery. 12. ONE VISITOR in the hospital at this time for outpatient procedures. Exceptions may be made for surgical admissions, per nursing unit guidelines      Special Instructions:      Bring list of CURRENT medications. Bring inhaler. Bring CPAP machine. Bring any pertinent legal medical records. Take these medications the morning of surgery with a sip of water:  bp meds  Follow physician instructions about insulin. Follow physician instructions about stopping anticoagulants. Complete bowel prep per MD instructions. On the day of surgery, come in the main entrance of [facility]. Let the  at the desk know you are there for surgery.   A staff member will come escort you to the surgical area on the second floor. If you have any questions or concerns, please do not hesitate to call:     (Prior to the day of surgery) PAT department:  [phone]   (Day of surgery) Pre-Op department:  [phone]    These surgical instructions were reviewed with patient during the PAT phone call.

## 2023-01-19 ENCOUNTER — ANESTHESIA EVENT (OUTPATIENT)
Dept: ENDOSCOPY | Age: 73
End: 2023-01-19
Payer: MEDICARE

## 2023-01-20 ENCOUNTER — ANESTHESIA (OUTPATIENT)
Dept: ENDOSCOPY | Age: 73
End: 2023-01-20
Payer: MEDICARE

## 2023-01-20 ENCOUNTER — HOSPITAL ENCOUNTER (OUTPATIENT)
Age: 73
Setting detail: OUTPATIENT SURGERY
Discharge: HOME | End: 2023-01-20
Attending: INTERNAL MEDICINE | Admitting: INTERNAL MEDICINE
Payer: MEDICARE

## 2023-01-20 VITALS
RESPIRATION RATE: 12 BRPM | BODY MASS INDEX: 33.1 KG/M2 | TEMPERATURE: 98.1 F | SYSTOLIC BLOOD PRESSURE: 136 MMHG | OXYGEN SATURATION: 98 % | HEART RATE: 60 BPM | WEIGHT: 218.4 LBS | HEIGHT: 68 IN | DIASTOLIC BLOOD PRESSURE: 59 MMHG

## 2023-01-20 LAB
GLUCOSE BLD STRIP.AUTO-MCNC: 103 MG/DL (ref 70–110)
GLUCOSE BLD STRIP.AUTO-MCNC: 127 MG/DL (ref 70–110)

## 2023-01-20 PROCEDURE — 77030008565 HC TBNG SUC IRR ERBE -B: Performed by: INTERNAL MEDICINE

## 2023-01-20 PROCEDURE — 74011000250 HC RX REV CODE- 250: Performed by: NURSE ANESTHETIST, CERTIFIED REGISTERED

## 2023-01-20 PROCEDURE — 88305 TISSUE EXAM BY PATHOLOGIST: CPT

## 2023-01-20 PROCEDURE — 2709999900 HC NON-CHARGEABLE SUPPLY: Performed by: INTERNAL MEDICINE

## 2023-01-20 PROCEDURE — 74011250636 HC RX REV CODE- 250/636: Performed by: NURSE ANESTHETIST, CERTIFIED REGISTERED

## 2023-01-20 PROCEDURE — 77030021593 HC FCPS BIOP ENDOSC BSC -A: Performed by: INTERNAL MEDICINE

## 2023-01-20 PROCEDURE — 74011250637 HC RX REV CODE- 250/637: Performed by: NURSE ANESTHETIST, CERTIFIED REGISTERED

## 2023-01-20 PROCEDURE — 77030013992 HC SNR POLYP ENDOSC BSC -B: Performed by: INTERNAL MEDICINE

## 2023-01-20 PROCEDURE — 76040000007: Performed by: INTERNAL MEDICINE

## 2023-01-20 PROCEDURE — 76060000032 HC ANESTHESIA 0.5 TO 1 HR: Performed by: INTERNAL MEDICINE

## 2023-01-20 PROCEDURE — 82962 GLUCOSE BLOOD TEST: CPT

## 2023-01-20 RX ORDER — LIDOCAINE HYDROCHLORIDE 20 MG/ML
INJECTION, SOLUTION EPIDURAL; INFILTRATION; INTRACAUDAL; PERINEURAL AS NEEDED
Status: DISCONTINUED | OUTPATIENT
Start: 2023-01-20 | End: 2023-01-20 | Stop reason: HOSPADM

## 2023-01-20 RX ORDER — SODIUM CHLORIDE 0.9 % (FLUSH) 0.9 %
5-40 SYRINGE (ML) INJECTION EVERY 8 HOURS
Status: DISCONTINUED | OUTPATIENT
Start: 2023-01-20 | End: 2023-01-20 | Stop reason: HOSPADM

## 2023-01-20 RX ORDER — IBUPROFEN 200 MG
4 TABLET ORAL AS NEEDED
Status: CANCELLED | OUTPATIENT
Start: 2023-01-20

## 2023-01-20 RX ORDER — DEXTROSE MONOHYDRATE 100 MG/ML
0-250 INJECTION, SOLUTION INTRAVENOUS AS NEEDED
Status: DISCONTINUED | OUTPATIENT
Start: 2023-01-20 | End: 2023-01-20 | Stop reason: HOSPADM

## 2023-01-20 RX ORDER — SODIUM CHLORIDE 0.9 % (FLUSH) 0.9 %
5-40 SYRINGE (ML) INJECTION EVERY 8 HOURS
Status: CANCELLED | OUTPATIENT
Start: 2023-01-20

## 2023-01-20 RX ORDER — SODIUM CHLORIDE 0.9 % (FLUSH) 0.9 %
5-40 SYRINGE (ML) INJECTION AS NEEDED
Status: DISCONTINUED | OUTPATIENT
Start: 2023-01-20 | End: 2023-01-20 | Stop reason: HOSPADM

## 2023-01-20 RX ORDER — LIDOCAINE HYDROCHLORIDE 10 MG/ML
0.1 INJECTION, SOLUTION EPIDURAL; INFILTRATION; INTRACAUDAL; PERINEURAL AS NEEDED
Status: DISCONTINUED | OUTPATIENT
Start: 2023-01-20 | End: 2023-01-20 | Stop reason: HOSPADM

## 2023-01-20 RX ORDER — FAMOTIDINE 20 MG/1
20 TABLET, FILM COATED ORAL ONCE
Status: COMPLETED | OUTPATIENT
Start: 2023-01-20 | End: 2023-01-20

## 2023-01-20 RX ORDER — SODIUM CHLORIDE 0.9 % (FLUSH) 0.9 %
5-40 SYRINGE (ML) INJECTION AS NEEDED
Status: CANCELLED | OUTPATIENT
Start: 2023-01-20

## 2023-01-20 RX ORDER — SODIUM CHLORIDE, SODIUM LACTATE, POTASSIUM CHLORIDE, CALCIUM CHLORIDE 600; 310; 30; 20 MG/100ML; MG/100ML; MG/100ML; MG/100ML
25 INJECTION, SOLUTION INTRAVENOUS CONTINUOUS
Status: DISCONTINUED | OUTPATIENT
Start: 2023-01-20 | End: 2023-01-20 | Stop reason: HOSPADM

## 2023-01-20 RX ORDER — INSULIN LISPRO 100 [IU]/ML
INJECTION, SOLUTION INTRAVENOUS; SUBCUTANEOUS ONCE
Status: DISCONTINUED | OUTPATIENT
Start: 2023-01-20 | End: 2023-01-20 | Stop reason: HOSPADM

## 2023-01-20 RX ORDER — INSULIN LISPRO 100 [IU]/ML
INJECTION, SOLUTION INTRAVENOUS; SUBCUTANEOUS ONCE
Status: CANCELLED | OUTPATIENT
Start: 2023-01-20 | End: 2023-01-20

## 2023-01-20 RX ORDER — IBUPROFEN 200 MG
4 TABLET ORAL AS NEEDED
Status: DISCONTINUED | OUTPATIENT
Start: 2023-01-20 | End: 2023-01-20 | Stop reason: HOSPADM

## 2023-01-20 RX ORDER — ONDANSETRON 2 MG/ML
4 INJECTION INTRAMUSCULAR; INTRAVENOUS AS NEEDED
Status: CANCELLED | OUTPATIENT
Start: 2023-01-20

## 2023-01-20 RX ORDER — SODIUM CHLORIDE, SODIUM LACTATE, POTASSIUM CHLORIDE, CALCIUM CHLORIDE 600; 310; 30; 20 MG/100ML; MG/100ML; MG/100ML; MG/100ML
75 INJECTION, SOLUTION INTRAVENOUS CONTINUOUS
Status: CANCELLED | OUTPATIENT
Start: 2023-01-20

## 2023-01-20 RX ORDER — PROPOFOL 10 MG/ML
INJECTION, EMULSION INTRAVENOUS AS NEEDED
Status: DISCONTINUED | OUTPATIENT
Start: 2023-01-20 | End: 2023-01-20 | Stop reason: HOSPADM

## 2023-01-20 RX ADMIN — PROPOFOL 30 MG: 10 INJECTION, EMULSION INTRAVENOUS at 11:17

## 2023-01-20 RX ADMIN — PROPOFOL 30 MG: 10 INJECTION, EMULSION INTRAVENOUS at 11:12

## 2023-01-20 RX ADMIN — PROPOFOL 20 MG: 10 INJECTION, EMULSION INTRAVENOUS at 11:23

## 2023-01-20 RX ADMIN — SODIUM CHLORIDE, POTASSIUM CHLORIDE, SODIUM LACTATE AND CALCIUM CHLORIDE 25 ML/HR: 600; 310; 30; 20 INJECTION, SOLUTION INTRAVENOUS at 10:35

## 2023-01-20 RX ADMIN — PROPOFOL 40 MG: 10 INJECTION, EMULSION INTRAVENOUS at 11:10

## 2023-01-20 RX ADMIN — PROPOFOL 20 MG: 10 INJECTION, EMULSION INTRAVENOUS at 11:11

## 2023-01-20 RX ADMIN — FAMOTIDINE 20 MG: 20 TABLET, FILM COATED ORAL at 10:37

## 2023-01-20 RX ADMIN — PROPOFOL 30 MG: 10 INJECTION, EMULSION INTRAVENOUS at 11:15

## 2023-01-20 RX ADMIN — PROPOFOL 20 MG: 10 INJECTION, EMULSION INTRAVENOUS at 11:26

## 2023-01-20 RX ADMIN — PROPOFOL 30 MG: 10 INJECTION, EMULSION INTRAVENOUS at 11:20

## 2023-01-20 RX ADMIN — LIDOCAINE HYDROCHLORIDE 50 MG: 20 INJECTION, SOLUTION EPIDURAL; INFILTRATION; INTRACAUDAL; PERINEURAL at 11:10

## 2023-01-20 NOTE — H&P
[employer]  [phone]      History and Physical    Patient: Eric Padgett MRN: [id number]  SSN: [id number]    YOB: 1950  Age: 81 y.o. Sex: female      Subjective:      Eric Padgett is a 81 y.o. female who presents for routine, average risk CRCS. Pt denies symptoms or concerns. .     Past Medical History:   Diagnosis Date    Breast cyst 1994    benign, removed    Deviated septum 04/2010    septoplasty    Diabetes mellitus     Type 2    GERD (gastroesophageal reflux disease)     severe    Hypertension     Hypothyroidism     Other elective surgery 07/2008    toe surgery    Pure hypercholesterolemia     Stress thallium 09/30/2009    No evidence of ischemia or infarction; EF 78%; EKG portion negative with exercise capacity below average for age at 6 min of exercise    Varicose veins     closure in right leg 2008 & left leg 2007    Venous reflux study 06/24/2014    No DVT. [facility] occlusions of bilateral GSV. Past Surgical History:   Procedure Laterality Date    HX HEENT      Septoplasty    HX HYSTERECTOMY      HX KNEE REPLACEMENT Right     HX PARTIAL HYSTERECTOMY  1994    OH CYSTOTOMY EXCISION BLADDER TUMOR  2006    OH EGD DELIVER THERMAL ENERGY SPHNCTR/CARDIA GERD      OH THYROIDECTOMY TOTAL/COMPLETE  1992    OH UNLISTED PROCEDURE BREAST      bilateral bx, benign      Family History   Problem Relation Age of Onset    Heart Attack Maternal Grandmother     Hypertension Mother      Social History     Tobacco Use    Smoking status: Never    Smokeless tobacco: Never   Substance Use Topics    Alcohol use: No      Prior to Admission medications    Medication Sig Start Date End Date Taking? Authorizing Provider   cholecalciferol (Vitamin D3) (1000 Units /25 mcg) tablet Take  by mouth daily. Yes Provider, Historical   amLODIPine (NORVASC) 10 mg tablet Take  by mouth daily. Yes Provider, Historical   lansoprazole (PREVACID PO) Take  by mouth two (2) times a day.    Yes Provider, Historical levocetirizine (XYZAL) 5 mg tablet Take  by mouth. Yes Provider, Historical   ECHINACEA PO Take  by mouth. Yes Provider, Historical   metoprolol succinate (TOPROL-XL) 100 mg tablet TAKE 1 TABLET BY MOUTH EVERY DAY 9/11/20  Yes Jaymie Gomez,    telmisartan (MICARDIS) 80 mg tablet Take 80 mg by mouth every evening. 7/24/20  Yes Provider, Historical   escitalopram oxalate (LEXAPRO) 10 mg tablet Take 5 mg by mouth daily. Yes Provider, Historical   metFORMIN (GLUCOPHAGE) 500 mg tablet Take 500 mg by mouth daily (with breakfast). Yes Provider, Historical   levothyroxine (SYNTHROID) 88 mcg tablet Take 88 mcg by mouth daily. Yes Provider, Historical   cholecalciferol (VITAMIN D3) 50,000 unit capsule TAKE 1 CAPSULE BY MOUTH ONCE WEEKLY  Patient not taking: Reported on 1/10/2023 6/14/18   Provider, Historical   pantoprazole (PROTONIX) 40 mg tablet Take 40 mg by mouth two (2) times a day. Patient not taking: Reported on 1/10/2023 6/8/14   Provider, Historical        Allergies   Allergen Reactions    Pcn [Penicillins] Unknown (comments)       Review of Systems:  A comprehensive review of systems was negative except for that written in the History of Present Illness. Objective:     Vitals:    01/10/23 1149   Weight: 100.7 kg (222 lb)   Height: 5' 7.5\" (1.715 m)        Physical Exam:  GENERAL: alert, cooperative, no distress, appears stated age  LUNG: clear to auscultation bilaterally  HEART: regular rate and rhythm, S1, S2 normal, no murmur, click, rub or gallop  ABDOMEN: soft, non-tender. Bowel sounds normal. No masses,  no organomegaly  NEUROLOGIC: alert & oriented x 3    Assessment:     1. Colorectal Cancer Screening    Plan:     1. Colonoscopy    Signed By: Eda Valentine MD     January 20, 2023      Eda Valentine MD  [facility] [address] - [phone]  www. "[employer]

## 2023-01-20 NOTE — DISCHARGE INSTRUCTIONS
Colonoscopy: What to Expect at [address] [place] [address]  After a colonoscopy, you'll stay at the clinic until you wake up. Then you can go home. But you'll need to arrange for a ride. Your doctor will tell you when you can eat and do your other usual activities. Your doctor will talk to you about when you'll need your next colonoscopy. Your doctor can help you decide how often you need to be checked. This will depend on the results of your test and your risk for colorectal cancer. After the test, you may be bloated or have gas pains. You may need to pass gas. If a biopsy was done or a polyp was removed, you may have streaks of blood in your stool (feces) for a few days. Problems such as heavy rectal bleeding may not occur until several weeks after the test. This isn't common. But it can happen after polyps are removed. This care sheet gives you a general idea about how long it will take for you to recover. But each person recovers at a different pace. Follow the steps below to get better as quickly as possible. How can you care for yourself at home? Activity    Rest when you feel tired. You can do your normal activities when it feels okay to do so. Diet    Follow your doctor's directions for eating. Unless your doctor has told you not to, drink plenty of fluids. This helps to replace the fluids that were lost during the colon prep. Do not drink alcohol. Medicines    Your doctor will tell you if and when you can restart your medicines. You will also be given instructions about taking any new medicines. If you take aspirin or some other blood thinner, ask your doctor if and when to start taking it again. Make sure that you understand exactly what your doctor wants you to do. If polyps were removed or a biopsy was done during the test, your doctor may tell you not to take aspirin or other anti-inflammatory medicines for a few days. These include ibuprofen (Advil, Motrin) and naproxen (Aleve). Other instructions    For your safety, do not drive or operate machinery until the medicine wears off and you can think clearly. Your doctor may tell you not to drive or operate machinery until the day after your test.     Do not sign legal documents or make major decisions until the medicine wears off and you can think clearly. The anesthesia can make it hard for you to fully understand what you are agreeing to. Follow-up care is a key part of your treatment and safety. Be sure to make and go to all appointments, and call your doctor if you are having problems. It's also a good idea to know your test results and keep a list of the medicines you take. When should you call for help? Call 911 anytime you think you may need emergency care. For example, call if:    You passed out (lost consciousness). You pass maroon or bloody stools. You have trouble breathing. Call your doctor now or seek immediate medical care if:    You have pain that does not get better after you take pain medicine. You are sick to your stomach or cannot drink fluids. You have new or worse belly pain. You have blood in your stools. You have a fever. You cannot pass stools or gas. Watch closely for changes in your health, and be sure to contact your doctor if you have any problems. Where can you learn more? Go to http://www.gray.com/  Enter E264 in the search box to learn more about \"Colonoscopy: What to Expect at Home. \"  Current as of: September 8, 2021               Content Version: 13.2  © 2006-2022 Healthwise, Incorporated. Care instructions adapted under license by [employer] (which disclaims liability or warranty for this information). If you have questions about a medical condition or this instruction, always ask your healthcare professional. [place] [address] any warranty or liability for your use of this information.        Colon Polyps: Care Instructions  Your Care Instructions     Colon polyps are growths in the colon or the rectum. The cause of most colon polyps is not known, and most people who get them do not have any problems. But a certain kind can turn into cancer. For this reason, regular testing for colon polyps is important for people as they get older. It is also important for anyone who has an increased risk for colon cancer. Polyps are usually found through routine colon cancer screening tests. Although most colon polyps are not cancerous, they are usually removed and then tested for cancer. Screening for colon cancer saves lives because the cancer can usually be cured if it is caught early. If you have a polyp that is the type that can turn into cancer, you may need more tests to examine your entire colon. The doctor will remove any other polyps that are found, and you will be tested more often. Follow-up care is a key part of your treatment and safety. Be sure to make and go to all appointments, and call your doctor if you are having problems. It's also a good idea to know your test results and keep a list of the medicines you take. How can you care for yourself at home? Regular exams to look for colon polyps are the best way to prevent polyps from turning into colon cancer. These can include stool tests, sigmoidoscopy, colonoscopy, and CT colonography. Talk with your doctor about a testing schedule that is right for you. To prevent polyps  There is no home treatment that can prevent colon polyps. But these steps may help lower your risk for cancer. Stay active. Being active can help you get to and stay at a healthy weight. Try to exercise on most days of the week. Walking is a good choice. Eat well. Choose a variety of vegetables, fruits, legumes (such as peas and beans), fish, poultry, and whole grains. Do not smoke. If you need help quitting, talk to your doctor about stop-smoking programs and medicines.  These can increase your chances of quitting for good. If you drink alcohol, limit how much you drink. Limit alcohol to 2 drinks a day for men and 1 drink a day for women. When should you call for help? Call your doctor now or seek immediate medical care if:    You have severe belly pain. Your stools are maroon or very bloody. Watch closely for changes in your health, and be sure to contact your doctor if:    You have a fever. You have nausea or vomiting. You have a change in bowel habits (new constipation or diarrhea). Your symptoms get worse or are not improving as expected. Where can you learn more? Go to http://www.guevara.com/  Enter C571 in the search box to learn more about \"Colon Polyps: Care Instructions. \"  Current as of: June 6, 2022               Content Version: 13.4  © 7422-4303 [employer]. Care instructions adapted under license by [employer] (which disclaims liability or warranty for this information). If you have questions about a medical condition or this instruction, always ask your healthcare professional. [facility] any warranty or liability for your use of this information. Hemorrhoids: Care Instructions  Overview     Hemorrhoids are swollen veins that develop in the anal canal. Bleeding during bowel movements, itching, and rectal pain are the most common symptoms. Hemorrhoids can be uncomfortable at times, but rarely are they a serious problem. Most of the time, you can treat them with simple changes to your diet and bowel habits. These changes include eating more fiber and not straining to pass stools. Most hemorrhoids don't need surgery or other treatment unless they are very large and painful or bleed a lot. Follow-up care is a key part of your treatment and safety. Be sure to make and go to all appointments, and call your doctor if you are having problems.  It's also a good idea to know your test results and keep a list of the medicines you take. How can you care for yourself at home? Sit in a few inches of warm water (sitz bath) 3 times a day and after bowel movements. The warm water helps with pain and itching. Put ice on your anal area several times a day for 10 minutes at a time. Put a thin cloth between the ice and your skin. Follow this by placing a warm, wet towel on the area for another 10 to 20 minutes. Take pain medicines exactly as directed. If the doctor gave you a prescription medicine for pain, take it as prescribed. If you are not taking a prescription pain medicine, ask your doctor if you can take an over-the-counter medicine. Keep the anal area clean, but be gentle. Use water and a fragrance-free soap, or use baby wipes or medicated pads such as Tucks. Wear cotton underwear and loose clothing to decrease moisture in the anal area. Eat more fiber. Include foods such as whole-grain breads and cereals, raw vegetables, raw and dried fruits, and beans. Drink plenty of fluids. If you have kidney, heart, or liver disease and have to limit fluids, talk with your doctor before you increase the amount of fluids you drink. Use a stool softener that contains bran or psyllium. You can save money by buying bran or psyllium (available in bulk at most health food stores) and sprinkling it on foods or stirring it into fruit juice. Or you can use a product such as Metamucil or Hydrocil. Practice healthy bowel habits. Go to the bathroom as soon as you have the urge. Avoid straining to pass stools. Relax and give yourself time to let things happen naturally. Do not hold your breath while passing stools. Do not read while sitting on the toilet. Get off the toilet as soon as you have finished. Take your medicines exactly as prescribed. Call your doctor if you think you are having a problem with your medicine. When should you call for help? Call 911 anytime you think you may need emergency care.  For example, call if:    You pass maroon or very bloody stools. Call your doctor now or seek immediate medical care if:    You have increased pain. You have increased bleeding. Watch closely for changes in your health, and be sure to contact your doctor if:    Your symptoms have not improved after 3 or 4 days. Where can you learn more? Go to http://www.guevara.com/  Enter F228 in the search box to learn more about \"Hemorrhoids: Care Instructions. \"  Current as of: June 6, 2022               Content Version: 13.4  © 2006-2022 [employer]. Care instructions adapted under license by [employer] (which disclaims liability or warranty for this information). If you have questions about a medical condition or this instruction, always ask your healthcare professional. [address] any warranty or liability for your use of this information. Diverticulosis: Care Instructions  Your Care Instructions  In diverticulosis, pouches called diverticula form in the wall of the large intestine (colon). The pouches do not cause any pain or other symptoms. Most people who have diverticulosis do not know they have it. But the pouches sometimes bleed, and if they become infected, they can cause pain and other symptoms. When this happens, it is called diverticulitis. Diverticula form when pressure pushes the wall of the colon outward at certain weak points. A diet that is too low in fiber can cause diverticula. Follow-up care is a key part of your treatment and safety. Be sure to make and go to all appointments, and call your doctor if you are having problems. It's also a good idea to know your test results and keep a list of the medicines you take. How can you care for yourself at home? Include fruits, leafy green vegetables, beans, and whole grains in your diet each day. These foods are high in fiber.   Take a fiber supplement, such as Citrucel or Metamucil, every day if needed. Read and follow all instructions on the label. Drink plenty of fluids. If you have kidney, heart, or liver disease and have to limit fluids, talk with your doctor before you increase the amount of fluids you drink. Get at least 30 minutes of exercise on most days of the week. Walking is a good choice. You also may want to do other activities, such as running, swimming, cycling, or playing tennis or team sports. Cut out foods that cause gas, pain, or other symptoms. When should you call for help? Call your doctor now or seek immediate medical care if:    You have belly pain. You pass maroon or very bloody stools. You have a fever. You have nausea and vomiting. You have unusual changes in your bowel movements or abdominal swelling. You have burning pain when you urinate. You have abnormal vaginal discharge. You have shoulder pain. You have cramping pain that does not get better when you have a bowel movement or pass gas. You pass gas or stool from your urethra while urinating. Watch closely for changes in your health, and be sure to contact your doctor if you have any problems. Where can you learn more? Go to http://www.gray.com/  Enter [id number] in the search box to learn more about \"Diverticulosis: Care Instructions. \"  Current as of: June 6, 2022               Content Version: 13.4  © 3818-0717 Healthwise, Incorporated. Care instructions adapted under license by [employer] (which disclaims liability or warranty for this information). If you have questions about a medical condition or this instruction, always ask your healthcare professional. [place] [address] any warranty or liability for your use of this information. DISCHARGE SUMMARY from Nurse     POST-PROCEDURE INSTRUCTIONS:    Call your Physician if you:  Observe any excess bleeding.   Develop a temperature over 100.5o F.  Experience abdominal, shoulder or chest pain. Notice any signs of decreased circulation or nerve impairment to an extremity such as a change in color, persistent numbness, tingling, coldness or increase in pain. Vomit blood or you have nausea and vomiting lasting longer than 4 hours. Are unable to take medications. Are unable to urinate within 8 hours after discharge following general anesthesia or intravenous sedation. For the next 24 hours after receiving general anesthesia or intravenous sedation, or while taking prescription Narcotics, limit your activities:  Do NOT drive a motor vehicle, operate hazard machinery or power tools, or perform tasks that require coordination. The medication you received during your procedure may have some effect on your mental awareness. Do NOT make important personal or business decisions. The medication you received during your procedure may have some effect on your mental awareness. Do NOT drink alcoholic beverages. These drinks do not mix well with the medications that have been given to you. Upon discharge from the hospital, you must be accompanied by a responsible adult. Resume your diet as directed by your physician. Resume medications as your physician has prescribed. Please give a list of your current medications to your Primary Care Provider. Please update this list whenever your medications are discontinued, doses are changed, or new medications (including over-the-counter products) are added. Please carry medication information at all times in case of emergency situations. These are general instructions for a healthy lifestyle:    No smoking/ No tobacco products/ Avoid exposure to second hand smoke. Surgeon General's Warning:  Quitting smoking now greatly reduces serious risk to your health. Obesity, smoking, and a sedentary lifestyle greatly increase your risk for illness.   A healthy diet, regular physical exercise & weight monitoring are important for maintaining a healthy lifestyle  You may be retaining fluid if you have a history of heart failure or if you experience any of the following symptoms:  Weight gain of 3 pounds or more overnight or 5 pounds in a week, increased swelling in our hands or feet or shortness of breath while lying flat in bed. Please call your doctor as soon as you notice any of these symptoms; do not wait until your next office visit. Recognize signs and symptoms of STROKE:  F  -  Face looks uneven  A  -  Arms unable to move or move unevenly  S  -  Speech slurred or non-existent  T  -  Time to call 911 - as soon as signs and symptoms begin - DO NOT go back to bed or wait to see If you get better - TIME IS BRAIN. Colorectal Screening  Colorectal cancer almost always develops from precancerous polyps (abnormal growths) in the colon or rectum. Screening tests can find precancerous polyps, so that they can be removed before they turn into cancer. Screening tests can also find colorectal cancer early, when treatment works best.  Speak with your physician about when you should begin screening and how often you should be tested. [employer] Activation    Thank you for requesting access to [employer]. Please follow the instructions below to securely access and download your online medical record. [employer] allows you to send messages to your doctor, view your test results, renew your prescriptions, schedule appointments, and more. How Do I Sign Up? In your internet browser, go to [email or web address]. Click on the First Time User? Click Here link in the Sign In box. You will see the New Member Sign Up page. Enter your [employer] Access Code exactly as it appears below. You will not need to use this code after youve completed the sign-up process. If you do not sign up before the expiration date, you must request a new code. [employer] Access Code:  Activation code not generated  Current Kony Status: Active (This is the date your Kony access code will )    Enter the last four digits of your Social Security Number (xxxx) and Date of Birth (mm/dd/yyyy) as indicated and click Submit. You will be taken to the next sign-up page. Create a eBaoTecht ID. This will be your [employer] login ID and cannot be changed, so think of one that is secure and easy to remember. Create a Kony password. You can change your password at any time. Enter your Password Reset Question and Answer. This can be used at a later time if you forget your password. Enter your e-mail address. You will receive e-mail notification when new information is available in [address] Click Sign Up. You can now view and download portions of your medical record. Click the Peer5 link to download a portable copy of your medical information. Additional Information    If you have questions, please call [phone]. Remember, Kony is NOT to be used for urgent needs. For medical emergencies, dial 911. Educational references and/or instructions provided during this visit included:    See Attached      APPOINTMENTS:    Per MD Instruction    Discharge information has been reviewed with the patient. The patient verbalized understanding.

## 2023-01-20 NOTE — PROCEDURES
[email or web address]  [phone]        Brief Procedure Note    Hernan Barton  1950  [id number]    Date of Procedure: 1/20/2023    Preoperative diagnosis: Colon cancer screening [Z12.11]  Family history of colon cancer [Z80.0]  Obesity (BMI 30-39. 9) [E66.9]    Postoperative diagnosis: external hemorrhoids, diverticulosis, descending polyp x1, sigmoid polyps x2, internal hemorrhoids    Description of Findings: same    Sedation/Anesthesia: Monitored Anesthesia Care; See Anesthesia Note    Procedure: Procedure(s):  COLONOSCOPY/ Polypectomies    :  Dr. Eufemia Henning MD    Assistant(s): Endoscopy Technician-1: Jaime Mayberry  Staff: Bee Smiley RN    EBL:None    Specimens:   ID Type Source Tests Collected by Time Destination   1 : descending polyp Preservative Colon, Descending  Adrienne Singer MD 1/20/2023 1119 Pathology   2 : sigmoid polyps Preservative Sigmoid  Adrienne Singer MD 1/20/2023 1123 Pathology       Findings: See printed and scanned procedure note    Complications: None    Tissue Implant Device: None    Dr. Eufemia Henning MD  1/20/2023  11:34 AM    Eufemia Henning MD  [facility] [address] [phone]  www. [email or web address]

## 2023-01-20 NOTE — ANESTHESIA PREPROCEDURE EVALUATION
Relevant Problems   No relevant active problems       Anesthetic History   No history of anesthetic complications            Review of Systems / Medical History  Patient summary reviewed and pertinent labs reviewed    Pulmonary  Within defined limits                 Neuro/Psych   Within defined limits           Cardiovascular    Hypertension: well controlled              Exercise tolerance: >4 METS     GI/Hepatic/Renal     GERD: well controlled           Endo/Other    Diabetes: type 2  Hypothyroidism: well controlled  Morbid obesity and arthritis     Other Findings              Physical Exam    Airway  Mallampati: III  TM Distance: 4 - 6 cm  Neck ROM: decreased range of motion   Mouth opening: Normal     Cardiovascular    Rhythm: regular  Rate: normal         Dental    Dentition: Poor dentition     Pulmonary  Breath sounds clear to auscultation               Abdominal  GI exam deferred       Other Findings            Anesthetic Plan    ASA: 3  Anesthesia type: MAC            Anesthetic plan and risks discussed with: Patient

## 2023-01-20 NOTE — ANESTHESIA POSTPROCEDURE EVALUATION
Procedure(s):  COLONOSCOPY/ Polypectomies.     MAC    Anesthesia Post Evaluation      Multimodal analgesia: multimodal analgesia used between 6 hours prior to anesthesia start to PACU discharge  Patient location during evaluation: bedside  Patient participation: complete - patient participated  Level of consciousness: awake  Pain management: adequate  Airway patency: patent  Anesthetic complications: no  Cardiovascular status: stable  Respiratory status: acceptable  Hydration status: acceptable  Post anesthesia nausea and vomiting:  controlled      INITIAL Post-op Vital signs:   Vitals Value Taken Time   /60 01/20/23 1137   Temp 36.7 °C (98.1 °F) 01/20/23 1137   Pulse 68 01/20/23 1137   Resp 16 01/20/23 1137   SpO2 98 % 01/20/23 1137

## 2023-03-20 ENCOUNTER — HOSPITAL ENCOUNTER (OUTPATIENT)
Facility: HOSPITAL | Age: 73
Discharge: HOME | End: 2023-03-23
Payer: MEDICARE

## 2023-03-20 DIAGNOSIS — Z12.31 ENCOUNTER FOR SCREENING MAMMOGRAM FOR BREAST CANCER: ICD-10-CM

## 2023-03-20 PROCEDURE — 77063 BREAST TOMOSYNTHESIS BI: CPT

## 2023-05-18 ENCOUNTER — HOSPITAL ENCOUNTER (OUTPATIENT)
Facility: HOSPITAL | Age: 73
Discharge: HOME | End: 2023-05-18
Payer: MEDICARE

## 2023-05-18 DIAGNOSIS — S33.5XXD SPRAIN OF LIGAMENTS OF LUMBAR SPINE, SUBSEQUENT ENCOUNTER: ICD-10-CM

## 2023-05-18 PROCEDURE — 72148 MRI LUMBAR SPINE W/O DYE: CPT

## 2023-09-07 ENCOUNTER — OFFICE VISIT (OUTPATIENT)
Age: 73
End: 2023-09-07

## 2023-09-07 VITALS
HEIGHT: 68 IN | OXYGEN SATURATION: 93 % | BODY MASS INDEX: 35.01 KG/M2 | WEIGHT: 231 LBS | DIASTOLIC BLOOD PRESSURE: 78 MMHG | HEART RATE: 59 BPM | SYSTOLIC BLOOD PRESSURE: 122 MMHG

## 2023-09-07 DIAGNOSIS — R00.2 PALPITATION: Primary | ICD-10-CM

## 2023-09-07 DIAGNOSIS — R07.9 CHEST PAIN, UNSPECIFIED TYPE: ICD-10-CM

## 2023-09-07 RX ORDER — HYDRALAZINE HYDROCHLORIDE 25 MG/1
25 TABLET, FILM COATED ORAL DAILY
COMMUNITY
Start: 2023-09-01

## 2023-09-07 RX ORDER — VALSARTAN 320 MG/1
320 TABLET ORAL DAILY
COMMUNITY
Start: 2023-08-02

## 2023-09-07 RX ORDER — LANSOPRAZOLE 30 MG/1
5 CAPSULE, DELAYED RELEASE ORAL DAILY
COMMUNITY
Start: 2023-08-06

## 2023-09-07 RX ORDER — DAPAGLIFLOZIN 10 MG/1
10 TABLET, FILM COATED ORAL DAILY
COMMUNITY
Start: 2023-08-02

## 2023-09-07 RX ORDER — BUSPIRONE HYDROCHLORIDE 10 MG/1
10 TABLET ORAL DAILY
COMMUNITY
Start: 2022-07-20

## 2023-09-07 RX ORDER — AMLODIPINE BESYLATE 10 MG/1
10 TABLET ORAL DAILY
COMMUNITY
Start: 2022-11-15

## 2023-09-07 ASSESSMENT — PATIENT HEALTH QUESTIONNAIRE - PHQ9
2. FEELING DOWN, DEPRESSED OR HOPELESS: 0
SUM OF ALL RESPONSES TO PHQ QUESTIONS 1-9: 0
SUM OF ALL RESPONSES TO PHQ QUESTIONS 1-9: 0
SUM OF ALL RESPONSES TO PHQ9 QUESTIONS 1 & 2: 0
1. LITTLE INTEREST OR PLEASURE IN DOING THINGS: 0
SUM OF ALL RESPONSES TO PHQ QUESTIONS 1-9: 0
SUM OF ALL RESPONSES TO PHQ QUESTIONS 1-9: 0

## 2023-09-07 NOTE — PROGRESS NOTES
Celeste Gusman presents today for   Chief Complaint   Patient presents with    Follow-up     Overdue f/u    Palpitations     Fluttering and pounding off and on    Edema     Bilateral legs and ankles        Brunertruong Gusman preferred language for health care discussion is english/other. Is someone accompanying this pt? yes    Is the patient using any DME equipment during OV? cane    Depression Screening:  Depression: Not at risk    PHQ-2 Score: 0        Learning Assessment:  Who is the primary learner? Patient    What is the preferred language for health care of the primary learner? ENGLISH    How does the primary learner prefer to learn new concepts? DEMONSTRATION    Answered By patient    Relationship to Learner SELF           Pt currently taking Anticoagulant therapy? no    Pt currently taking Antiplatelet therapy ? no      Coordination of Care:  1. Have you been to the ER, urgent care clinic since your last visit? Hospitalized since your last visit? no    2. Have you seen or consulted any other health care providers outside of the [address] [place] [address] since your last visit? Include any pap smears or colon screening.  no

## 2024-02-19 ENCOUNTER — PATIENT MESSAGE (OUTPATIENT)
Age: 74
End: 2024-02-19

## 2024-05-02 ENCOUNTER — HOSPITAL ENCOUNTER (EMERGENCY)
Facility: HOSPITAL | Age: 74
Discharge: HOME | End: 2024-05-02
Attending: EMERGENCY MEDICINE
Payer: MEDICARE

## 2024-05-02 ENCOUNTER — APPOINTMENT (OUTPATIENT)
Facility: HOSPITAL | Age: 74
End: 2024-05-02
Payer: MEDICARE

## 2024-05-02 VITALS
DIASTOLIC BLOOD PRESSURE: 52 MMHG | SYSTOLIC BLOOD PRESSURE: 130 MMHG | RESPIRATION RATE: 20 BRPM | OXYGEN SATURATION: 96 % | HEART RATE: 72 BPM | TEMPERATURE: 98.1 F | BODY MASS INDEX: 35.65 KG/M2 | WEIGHT: 231 LBS

## 2024-05-02 DIAGNOSIS — S16.1XXA STRAIN OF NECK MUSCLE, INITIAL ENCOUNTER: ICD-10-CM

## 2024-05-02 DIAGNOSIS — S40.011A CONTUSION OF MULTIPLE SITES OF RIGHT SHOULDER, INITIAL ENCOUNTER: Primary | ICD-10-CM

## 2024-05-02 DIAGNOSIS — S00.33XA CONTUSION OF NOSE, INITIAL ENCOUNTER: ICD-10-CM

## 2024-05-02 PROCEDURE — 73030 X-RAY EXAM OF SHOULDER: CPT

## 2024-05-02 PROCEDURE — 70160 X-RAY EXAM OF NASAL BONES: CPT

## 2024-05-02 PROCEDURE — 99284 EMERGENCY DEPT VISIT MOD MDM: CPT

## 2024-05-02 RX ORDER — IBUPROFEN 400 MG/1
400 TABLET ORAL EVERY 6 HOURS PRN
Qty: 12 TABLET | Refills: 0 | Status: SHIPPED | OUTPATIENT
Start: 2024-05-02 | End: 2024-05-05

## 2024-05-02 ASSESSMENT — PAIN SCALES - GENERAL: PAINLEVEL_OUTOF10: 6

## 2024-05-02 ASSESSMENT — LIFESTYLE VARIABLES
HOW MANY STANDARD DRINKS CONTAINING ALCOHOL DO YOU HAVE ON A TYPICAL DAY: PATIENT DOES NOT DRINK
HOW OFTEN DO YOU HAVE A DRINK CONTAINING ALCOHOL: NEVER

## 2024-05-02 ASSESSMENT — PAIN - FUNCTIONAL ASSESSMENT: PAIN_FUNCTIONAL_ASSESSMENT: NONE - DENIES PAIN

## 2024-05-02 ASSESSMENT — PAIN DESCRIPTION - LOCATION: LOCATION: SHOULDER;NOSE

## 2024-05-02 ASSESSMENT — ENCOUNTER SYMPTOMS
ABDOMINAL PAIN: 0
FACIAL SWELLING: 1
EYES NEGATIVE: 1
CHEST TIGHTNESS: 0

## 2024-05-02 ASSESSMENT — PAIN DESCRIPTION - ORIENTATION: ORIENTATION: RIGHT

## 2024-05-02 ASSESSMENT — PAIN DESCRIPTION - DESCRIPTORS: DESCRIPTORS: ACHING

## 2024-05-02 NOTE — ED NOTES
Pt ambulatory to room with steady gait, denies dizziness. Pt reports tripping and falling in kitchen last night on face. Bruising noted to bridge of nose, tender to touch. Full ROM in all extremities, pt states \"My right side of my neck to my right shoulder is stiff and sore.\" Call bell within reach, will continue to monitor.

## 2024-05-02 NOTE — ED PROVIDER NOTES
EMERGENCY DEPARTMENT HISTORY AND PHYSICAL EXAM    8:13 AM      Date: 5/2/2024  Patient Name: Odilon Young    History of Presenting Illness     Chief Complaint   Patient presents with    Fall    Shoulder Pain    Nasal Pain       History From: Patient    Odilon Young is a 73 y.o. female   Patient is a 73-year-old female with a history of diabetes, hypertension, GERD, dyslipidemia, chronic osteoarthritis and pain in the shoulder and bilateral lower extremities including knees.  Patient has had injury to her right knee requiring physical therapy and overcompensates with the left knee and occasionally has episodes where her left knee gives way.  Patient says she was walking last evening around 7:30 PM and possibly tripped on the transition into the kitchen and fell onto the vinyl floor.  Patient says she landed on her right shoulder and also hit her right side of her face on the ground.  Patient is not on anticoagulation and denies passing out.  Patient said her  was able to get her to scoot over to the steps and then she was able to get up with little bit of assistance.  Patient will ambulate since then.  Patient's been having some right-sided nasal pain, right shoulder pain, wanted to be evaluated.  Patient denies any other aggravating relieving factors.  Patient denies any new hip pain and has been able to bear weight on her lower extremities.  Patient is retired from special needs education and is not a smoker, drinker, nor drug user.  Patient has pain medications at home that she does not take regularly and prefers not to take the medication if unnecessary.           Nursing Notes were all reviewed and agreed with or any disagreements were addressed in the HPI.    PCP: Alexx Payne MD    No current facility-administered medications for this encounter.     Current Outpatient Medications   Medication Sig Dispense Refill    ibuprofen (IBU) 400 MG tablet Take 1 tablet by mouth every 6 hours as needed for

## 2024-05-02 NOTE — ED TRIAGE NOTES
Patient A/O x 4, presented to the ED with complaint of falling on her face last night after tripping over something at home. Patient complaining of some nose pain, neck pain and right sided shoulder pain, Denies LOC, N/V.

## 2024-05-02 NOTE — DISCHARGE INSTRUCTIONS
Take the ibuprofen for inflammation for the next 3 days and follow closely with your primary provider as well as follow-up with a new orthopedist.  I given you information for an orthopedist in the area and if you have any worsening or concern, please return for reevaluation.

## 2024-05-23 ENCOUNTER — OFFICE VISIT (OUTPATIENT)
Age: 74
End: 2024-05-23
Payer: MEDICARE

## 2024-05-23 VITALS — BODY MASS INDEX: 35.63 KG/M2 | HEIGHT: 67 IN | WEIGHT: 227 LBS

## 2024-05-23 DIAGNOSIS — G56.03 BILATERAL CARPAL TUNNEL SYNDROME: Primary | ICD-10-CM

## 2024-05-23 PROCEDURE — 1090F PRES/ABSN URINE INCON ASSESS: CPT | Performed by: ORTHOPAEDIC SURGERY

## 2024-05-23 PROCEDURE — G8417 CALC BMI ABV UP PARAM F/U: HCPCS | Performed by: ORTHOPAEDIC SURGERY

## 2024-05-23 PROCEDURE — G8400 PT W/DXA NO RESULTS DOC: HCPCS | Performed by: ORTHOPAEDIC SURGERY

## 2024-05-23 PROCEDURE — 1036F TOBACCO NON-USER: CPT | Performed by: ORTHOPAEDIC SURGERY

## 2024-05-23 PROCEDURE — 99203 OFFICE O/P NEW LOW 30 MIN: CPT | Performed by: ORTHOPAEDIC SURGERY

## 2024-05-23 PROCEDURE — 1123F ACP DISCUSS/DSCN MKR DOCD: CPT | Performed by: ORTHOPAEDIC SURGERY

## 2024-05-23 PROCEDURE — 3017F COLORECTAL CA SCREEN DOC REV: CPT | Performed by: ORTHOPAEDIC SURGERY

## 2024-05-23 PROCEDURE — G8427 DOCREV CUR MEDS BY ELIG CLIN: HCPCS | Performed by: ORTHOPAEDIC SURGERY

## 2024-05-23 NOTE — PROGRESS NOTES
well as reevaluate the effectiveness of bracing at her next visit.    Return for EMG Review.     Plan was reviewed with patient, who verbalized agreement and understanding of the plan    Note: This note was completed using voice recognition software.  Any typographical/name errors or mistakes are unintentional.

## 2024-07-12 ENCOUNTER — TRANSCRIBE ORDERS (OUTPATIENT)
Facility: HOSPITAL | Age: 74
End: 2024-07-12

## 2024-07-12 DIAGNOSIS — Z12.31 SCREENING MAMMOGRAM FOR HIGH-RISK PATIENT: Primary | ICD-10-CM

## 2024-07-31 ENCOUNTER — HOSPITAL ENCOUNTER (OUTPATIENT)
Facility: HOSPITAL | Age: 74
Discharge: HOME | End: 2024-08-03
Payer: MEDICARE

## 2024-07-31 VITALS — BODY MASS INDEX: 35.47 KG/M2 | HEIGHT: 67 IN | WEIGHT: 226 LBS

## 2024-07-31 DIAGNOSIS — Z12.31 SCREENING MAMMOGRAM FOR HIGH-RISK PATIENT: ICD-10-CM

## 2024-07-31 PROCEDURE — 77063 BREAST TOMOSYNTHESIS BI: CPT

## 2024-08-09 ENCOUNTER — PROCEDURE VISIT (OUTPATIENT)
Age: 74
End: 2024-08-09

## 2024-08-09 VITALS
HEART RATE: 66 BPM | TEMPERATURE: 97.8 F | WEIGHT: 226 LBS | OXYGEN SATURATION: 98 % | HEIGHT: 67 IN | SYSTOLIC BLOOD PRESSURE: 111 MMHG | DIASTOLIC BLOOD PRESSURE: 65 MMHG | BODY MASS INDEX: 35.47 KG/M2

## 2024-08-09 DIAGNOSIS — R20.2 NUMBNESS AND TINGLING IN BOTH HANDS: Primary | ICD-10-CM

## 2024-08-09 DIAGNOSIS — R20.0 NUMBNESS AND TINGLING IN BOTH HANDS: Primary | ICD-10-CM

## 2024-08-09 DIAGNOSIS — G62.9 NEUROPATHY: ICD-10-CM

## 2024-08-09 DIAGNOSIS — R94.131 ABNORMAL EMG: ICD-10-CM

## 2024-08-09 NOTE — PROGRESS NOTES
Odilon BRANDON Hannah presents today for   Chief Complaint   Patient presents with    Follow-up     EMG- BILATERAL UPPER EXTREMITIES       Is someone accompanying this pt? NO    Is the patient using any DME equipment during OV? YES    Depression Screenin/7/2023     9:22 AM 2021    10:43 AM   PHQ-9 Questionaire   Little interest or pleasure in doing things 0 0   Feeling down, depressed, or hopeless 0 0   PHQ-9 Total Score 0 0         2023     9:22 AM 2021    10:43 AM   PHQ Scores   PHQ2 Score 0 0   PHQ2 Score  0   PHQ9 Score 0 0       Learning Assessment:  No question data found.    Abuse Screening:       No data to display                Fall Risk       No data to display                OPIOID RISK TOOL       No data to display                Coordination of Care:  1. Have you been to the ER, urgent care clinic since your last visit? NO Hospitalized since your last visit? NO    2. Have you seen or consulted any other health care providers outside of the [facility] since your last visit? NO Include any pap smears or colon screening. NO

## 2024-08-09 NOTE — PROGRESS NOTES
[facility]  [facility], [address]  [place] [address]  Phone: [phone]  Fax: [phone]    Odilon Young  : 1950  PCP: Alexx Payne MD  2024    ELECTROMYOGRAPHY AND NERVE CONDUCTION STUDIES    Odilon Young was referred by Dr. Baron for electrodiagnostic evaluation of numbness/tingling of both hands    NCV & EMG Findings:  Evaluation of the left ulnar (ADM) motor nerve showed decreased conduction velocity (Bel Elbow-Wrist, 47 m/s).  The left ulnar sensory nerve showed reduced amplitude (4 µV).  The right ulnar sensory nerve showed prolonged distal onset latency (3.3 ms) and reduced amplitude (3 µV).  All remaining nerves (as indicated in the following tables) were within normal limits.    All examined muscles (as indicated in the following table) showed no evidence of electrical instability    INTERPRETATION  This is an abnormal electrodiagnostic examination. These findings may be consistent with:  Length-dependent sensorimotor polyneuropathy - this is based on scattered abnormalities most consistent with axon loss throughout her arms     There are no electrodiagnostic findings consistent with cervical radiculopathy, brachial plexopathy, myopathy, or any other mononeuropathy.        CLINICAL INTERPRETATION  Her electrodiagnostic findings consistent with peripheral neuropathy may explain her bilateral hand symptoms. This also likely explains her bilateral leg numbness as well.       HISTORY OF PRESENT ILLNESS  Odilon Young is a 73 y.o. female.    Pt presents today with BUE EMG evaluation for numbness/tingling of both hands.    PAST MEDICAL HISTORY   History reviewed. No pertinent past medical history.    Past Surgical History:   Procedure Laterality Date    BREAST BIOPSY      HYSTERECTOMY (CERVIX STATUS UNKNOWN)      OVARY REMOVAL         MEDICATIONS    Current Outpatient Medications   Medication Sig Dispense Refill    amLODIPine (NORVASC) 10

## 2024-08-12 ENCOUNTER — OFFICE VISIT (OUTPATIENT)
Age: 74
End: 2024-08-12
Payer: MEDICARE

## 2024-08-12 VITALS — WEIGHT: 229.4 LBS | HEIGHT: 67 IN | BODY MASS INDEX: 36 KG/M2

## 2024-08-12 DIAGNOSIS — G62.9 PERIPHERAL POLYNEUROPATHY: Primary | ICD-10-CM

## 2024-08-12 PROCEDURE — G8400 PT W/DXA NO RESULTS DOC: HCPCS | Performed by: ORTHOPAEDIC SURGERY

## 2024-08-12 PROCEDURE — 3017F COLORECTAL CA SCREEN DOC REV: CPT | Performed by: ORTHOPAEDIC SURGERY

## 2024-08-12 PROCEDURE — G8427 DOCREV CUR MEDS BY ELIG CLIN: HCPCS | Performed by: ORTHOPAEDIC SURGERY

## 2024-08-12 PROCEDURE — 1090F PRES/ABSN URINE INCON ASSESS: CPT | Performed by: ORTHOPAEDIC SURGERY

## 2024-08-12 PROCEDURE — G8417 CALC BMI ABV UP PARAM F/U: HCPCS | Performed by: ORTHOPAEDIC SURGERY

## 2024-08-12 PROCEDURE — 1123F ACP DISCUSS/DSCN MKR DOCD: CPT | Performed by: ORTHOPAEDIC SURGERY

## 2024-08-12 PROCEDURE — 1036F TOBACCO NON-USER: CPT | Performed by: ORTHOPAEDIC SURGERY

## 2024-08-12 PROCEDURE — 99214 OFFICE O/P EST MOD 30 MIN: CPT | Performed by: ORTHOPAEDIC SURGERY

## 2024-08-12 RX ORDER — LIDOCAINE HYDROCHLORIDE 10 MG/ML
1 INJECTION, SOLUTION INFILTRATION; PERINEURAL ONCE
Status: CANCELLED | OUTPATIENT
Start: 2024-08-12 | End: 2024-08-12

## 2024-08-12 NOTE — PROGRESS NOTES
1 tablet by mouth every 6 hours as needed for Pain 12 tablet 0     No current facility-administered medications for this visit.       Allergies   Allergen Reactions    Penicillins      Other reaction(s): Unknown (comments)         Ht 1.702 m (5' 7\")   Wt 104.1 kg (229 lb 6.4 oz)   BMI 35.93 kg/m²   Physical Exam  Vitals and nursing note reviewed.   Constitutional:       General: She is not in acute distress.     Appearance: Normal appearance. She is not ill-appearing.   Cardiovascular:      Pulses: Normal pulses.   Pulmonary:      Effort: Pulmonary effort is normal. No respiratory distress.   Musculoskeletal:         General: Tenderness present. No swelling, deformity or signs of injury. Normal range of motion.      Cervical back: Normal range of motion and neck supple.      Right lower leg: No edema.      Left lower leg: No edema.   Skin:     General: Skin is warm and dry.      Capillary Refill: Capillary refill takes less than 2 seconds.      Findings: No bruising or erythema.   Neurological:      General: No focal deficit present.      Mental Status: She is alert and oriented to person, place, and time.   Psychiatric:         Mood and Affect: Mood normal.         Behavior: Behavior normal.          NEUROVASCULAR    Examination L R Examination L R   Carpal Comp. ? ? Pronator Comp. - -   Carpal Tinel ? ? Pronator Tinel - -   Phalen's - - Pronator Stress - -   Cubital Comp. - - Guyon Comp. - -   Cubital Tinel - - Guyon Tinel - -   Elbow Hyperflexion - - Adson's - -   Spurling's - - SC Comp. - -   PCB Median abn - - SC Tinel - -   Radial Tinel - - IC Comp. - -   Digital Tinel - - IC Tinel - -   Radial 2-Pt WNL WNL Ulnar 2-Pt WNL WNL     Radial Pulse: 2+  Capillary Refill: < 2 sec  Jean: Not Performed  Digital Jean: Not Performed    8/9/2024 NCV & EMG independently reviewed on 8/12/2024 and agree with the below findings:  Evaluation of the left ulnar (ADM) motor nerve showed decreased conduction velocity (Bel

## 2024-09-23 NOTE — PROGRESS NOTES
Called and spoke with pt daughter in regards to upcoming appt scheduled on 11/13/24 at 920am pt daughter agreed to reschedule with luisana morales on 11/13/24 at 9am   PT DAILY TREATMENT NOTE     Patient Name: Dandre Ospina  Date:10/31/2017  : 1950  [x]  Patient  Verified  [place] MED[place] / Plan: [place] MED[place] PART [place] / Product Type: Medicare /    In time:12:37  Out time:1:26  Total Treatment Time (min): 52  Visit #: 7 of 8    Treatment Area: Pain in right knee [M25.561]    SUBJECTIVE  Pain Level (0-10 scale): 0  Any medication changes, allergies to medications, adverse drug reactions, diagnosis change, or new procedure performed?: [x] No    [] Yes (see summary sheet for update)  Subjective functional status/changes:   [] No changes reported  \"Doing pretty good, I started just taking Motrin\"    OBJECTIVE    Modality rationale: decrease pain to improve the patients ability to perform ADLs   Min Type Additional Details    [] Estim:  []Unatt       []IFC  []Premod                        []Other:  []w/ice   []w/heat  Position:  Location:    [] Estim: []Att    []TENS instruct  []NMES                    []Other:  []w/US   []w/ice   []w/heat  Position:  Location:    []  Traction: [] Cervical       []Lumbar                       [] Prone          []Supine                       []Intermittent   []Continuous Lbs:  [] before manual  [] after manual    []  Ultrasound: []Continuous   [] Pulsed                           []1MHz   []3MHz W/cm2:  Location:    []  Iontophoresis with dexamethasone         Location: [] Take home patch   [] In clinic   10 [x]  Ice     []  heat  []  Ice massage  []  Laser   []  Anodyne Position: supine  Location: R knee    []  Laser with stim  []  Other:  Position:  Location:    []  Vasopneumatic Device Pressure:       [] lo [] med [] hi   Temperature: [] lo [] med [] hi   [] Skin assessment post-treatment:  []intact []redness- no adverse reaction    []redness - adverse reaction:       23 min Therapeutic Exercise:  [] See flow sheet :   Rationale: increase ROM and increase strength to improve the patients ability to perform functional tasks with increased ease    8 min Neuromuscular Re-education:  []  See flow sheet :   Rationale: improve coordination and improve balance  to improve the patients stability with gait    8 min Manual Therapy:  PROM R knee, mobs for improved extension   Rationale: increase ROM and increase tissue extensibility to improve gait and ADL efficiency              With   [] TE   [] TA   [] neuro   [] other: Patient Education: [x] Review HEP    [] Progressed/Changed HEP based on:   [] positioning   [] body mechanics   [] transfers   [] heat/ice application    [] other:      Other Objective/Functional Measures: 2-119 deg R knee AROM     Pain Level (0-10 scale) post treatment: 0    ASSESSMENT/Changes in Function: Pt progressing well with knee mobility at this time. She is challenged some with dynamic gait void of AD today. Continue to progress LE strength and functional task performance    Patient will continue to benefit from skilled PT services to modify and progress therapeutic interventions, address functional mobility deficits, address ROM deficits, address strength deficits, analyze and address soft tissue restrictions, analyze and cue movement patterns and analyze and modify body mechanics/ergonomics to attain remaining goals. []  See Plan of Care  []  See progress note/recertification  []  See Discharge Summary         Progress towards goals / Updated goals:  Short Term Goals: To be accomplished in 2 treatments:  1. Pt will demonstrate I and compliance with HEP to promote active role in rehabilitation. -reports semi-compliance (10/24/2017)  2. Pt will improve R knee extension to <5 deg for improved gait efficiency. -progressing, currently lacking five degrees (10/26/2017); Met lacking 2 deg 10/31/17  Long Term Goals: To be accomplished in 4 weeks:  1. Pt will improve FOTO score to 47 to demonstrate improved quality of life.-met, score to 48 (10/26/2017)  2.  Pt will demonstrate R knee AROM flexion to 120 deg for improved ease of stair negotiation. Near met 119 deg 10/31/17  3. Pt will improve R knee and hip strength to 4/5 for improved stability with ADLs.   4. Pt will perform 10 sit to stands from table without UE use to improve ease of transfers in the home.-progressing, able to perform without UE assist, however, presents with asymmetrical weight bear (10/26/2017); improved symmetry 10/31/17    PLAN  [x]  Upgrade activities as tolerated     []  Continue plan of care  []  Update interventions per flow sheet       []  Discharge due to:_  []  Other:_      Darío Sprain, DPT, CMTPT 10/31/2017  12:39 PM    Future Appointments  Date Time Provider Brenden Padilla   11/2/2017 11:30 AM Sophia Najera [place] [id number] [facility]   11/8/2017 11:00 AM Jayme Chaves MD [facility] YESSY SCHED   1/8/2018 2:40 PM Last Cooper DO [address] [place] [address]

## 2025-06-01 ENCOUNTER — HOSPITAL ENCOUNTER (EMERGENCY)
Age: 75
Discharge: HOME | End: 2025-06-01
Attending: EMERGENCY MEDICINE
Payer: MEDICARE

## 2025-06-01 VITALS
BODY MASS INDEX: 35 KG/M2 | OXYGEN SATURATION: 96 % | SYSTOLIC BLOOD PRESSURE: 114 MMHG | RESPIRATION RATE: 20 BRPM | TEMPERATURE: 97.5 F | WEIGHT: 223 LBS | HEIGHT: 67 IN | HEART RATE: 73 BPM | DIASTOLIC BLOOD PRESSURE: 60 MMHG

## 2025-06-01 DIAGNOSIS — I95.1 ORTHOSTATIC HYPOTENSION: ICD-10-CM

## 2025-06-01 DIAGNOSIS — N39.0 URINARY TRACT INFECTION ASSOCIATED WITH INDWELLING URETHRAL CATHETER, INITIAL ENCOUNTER: ICD-10-CM

## 2025-06-01 DIAGNOSIS — T83.011A MALFUNCTION OF FOLEY CATHETER, INITIAL ENCOUNTER: Primary | ICD-10-CM

## 2025-06-01 DIAGNOSIS — T83.511A URINARY TRACT INFECTION ASSOCIATED WITH INDWELLING URETHRAL CATHETER, INITIAL ENCOUNTER: ICD-10-CM

## 2025-06-01 LAB
ALBUMIN SERPL-MCNC: 4.1 G/DL (ref 3.4–5)
ALBUMIN/GLOB SERPL: 1.1 (ref 1–1.9)
ALP SERPL-CCNC: 61 U/L (ref 45–117)
ALT SERPL-CCNC: 10 U/L (ref 10–35)
ANION GAP SERPL CALC-SCNC: 15 MMOL/L (ref 7–16)
APPEARANCE UR: ABNORMAL
AST SERPL-CCNC: 13 U/L (ref 10–38)
BACTERIA URNS QL MICRO: ABNORMAL /HPF
BASOPHILS # BLD: 0.05 K/UL (ref 0–0.1)
BASOPHILS NFR BLD: 0.7 % (ref 0–2)
BILIRUB SERPL-MCNC: 0.2 MG/DL (ref 0.2–1)
BILIRUB UR QL: NEGATIVE
BUN SERPL-MCNC: 41 MG/DL (ref 6–23)
BUN/CREAT SERPL: 21
CALCIUM SERPL-MCNC: 9.3 MG/DL (ref 8.5–10.1)
CHLORIDE SERPL-SCNC: 104 MMOL/L (ref 98–107)
CO2 SERPL-SCNC: 21 MMOL/L (ref 21–32)
COLOR UR: YELLOW
CREAT SERPL-MCNC: 1.94 MG/DL (ref 0.6–1.3)
DIFFERENTIAL METHOD BLD: ABNORMAL
EOSINOPHIL # BLD: 0.25 K/UL (ref 0–0.4)
EOSINOPHIL NFR BLD: 3.6 % (ref 0–5)
EPITH CASTS URNS QL MICRO: ABNORMAL /LPF (ref 0–5)
ERYTHROCYTE [DISTWIDTH] IN BLOOD BY AUTOMATED COUNT: 13.2 % (ref 11.6–14.5)
GLOBULIN SER CALC-MCNC: 3.7 G/DL (ref 2.3–3.5)
GLUCOSE SERPL-MCNC: 112 MG/DL (ref 74–108)
GLUCOSE UR STRIP.AUTO-MCNC: 500 MG/DL
HCT VFR BLD AUTO: 32.3 % (ref 35–45)
HGB BLD-MCNC: 10.1 G/DL (ref 12–16)
HGB UR QL STRIP: ABNORMAL
IMM GRANULOCYTES # BLD AUTO: 0.03 K/UL (ref 0–0.04)
IMM GRANULOCYTES NFR BLD AUTO: 0.4 % (ref 0–0.5)
KETONES UR QL STRIP.AUTO: NEGATIVE MG/DL
LEUKOCYTE ESTERASE UR QL STRIP.AUTO: ABNORMAL
LYMPHOCYTES # BLD: 1.57 K/UL (ref 0.9–3.6)
LYMPHOCYTES NFR BLD: 22.6 % (ref 21–52)
MCH RBC QN AUTO: 29.4 PG (ref 24–34)
MCHC RBC AUTO-ENTMCNC: 31.3 G/DL (ref 31–37)
MCV RBC AUTO: 94.2 FL (ref 78–100)
MONOCYTES # BLD: 0.69 K/UL (ref 0.05–1.2)
MONOCYTES NFR BLD: 9.9 % (ref 3–10)
NEUTS SEG # BLD: 4.36 K/UL (ref 1.8–8)
NEUTS SEG NFR BLD: 62.8 % (ref 40–73)
NITRITE UR QL STRIP.AUTO: POSITIVE
NRBC # BLD: 0 K/UL (ref 0–0.01)
NRBC BLD-RTO: 0 PER 100 WBC
PH UR STRIP: 8.5 (ref 5–8)
PLATELET # BLD AUTO: 221 K/UL (ref 135–420)
PMV BLD AUTO: 10.2 FL (ref 9.2–11.8)
POTASSIUM SERPL-SCNC: 4.8 MMOL/L (ref 3.5–5.5)
PROT SERPL-MCNC: 7.8 G/DL (ref 6.4–8.2)
PROT UR STRIP-MCNC: 30 MG/DL
RBC # BLD AUTO: 3.43 M/UL (ref 4.2–5.3)
RBC #/AREA URNS HPF: ABNORMAL /HPF (ref 0–5)
SODIUM SERPL-SCNC: 139 MMOL/L (ref 136–145)
SP GR UR REFRACTOMETRY: 1.01 (ref 1–1.03)
TRI-PHOS CRY URNS QL MICRO: ABNORMAL
UROBILINOGEN UR QL STRIP.AUTO: 0.2 EU/DL (ref 0.2–1)
WBC # BLD AUTO: 7 K/UL (ref 4.6–13.2)
WBC URNS QL MICRO: ABNORMAL /HPF (ref 0–4)

## 2025-06-01 PROCEDURE — 81001 URINALYSIS AUTO W/SCOPE: CPT

## 2025-06-01 PROCEDURE — 96374 THER/PROPH/DIAG INJ IV PUSH: CPT

## 2025-06-01 PROCEDURE — 87186 SC STD MICRODIL/AGAR DIL: CPT

## 2025-06-01 PROCEDURE — 6360000002 HC RX W HCPCS: Performed by: EMERGENCY MEDICINE

## 2025-06-01 PROCEDURE — 81003 URINALYSIS AUTO W/O SCOPE: CPT

## 2025-06-01 PROCEDURE — 87088 URINE BACTERIA CULTURE: CPT

## 2025-06-01 PROCEDURE — 80053 COMPREHEN METABOLIC PANEL: CPT

## 2025-06-01 PROCEDURE — 85025 COMPLETE CBC W/AUTO DIFF WBC: CPT

## 2025-06-01 PROCEDURE — 99284 EMERGENCY DEPT VISIT MOD MDM: CPT

## 2025-06-01 PROCEDURE — 2580000003 HC RX 258: Performed by: EMERGENCY MEDICINE

## 2025-06-01 PROCEDURE — 2500000003 HC RX 250 WO HCPCS: Performed by: EMERGENCY MEDICINE

## 2025-06-01 PROCEDURE — 87086 URINE CULTURE/COLONY COUNT: CPT

## 2025-06-01 RX ORDER — 0.9 % SODIUM CHLORIDE 0.9 %
500 INTRAVENOUS SOLUTION INTRAVENOUS ONCE
Status: COMPLETED | OUTPATIENT
Start: 2025-06-01 | End: 2025-06-01

## 2025-06-01 RX ORDER — CEFDINIR 300 MG/1
300 CAPSULE ORAL 2 TIMES DAILY
Qty: 14 CAPSULE | Refills: 0 | Status: SHIPPED | OUTPATIENT
Start: 2025-06-01 | End: 2025-06-08

## 2025-06-01 RX ADMIN — SODIUM CHLORIDE 500 ML: 0.9 INJECTION, SOLUTION INTRAVENOUS at 12:31

## 2025-06-01 RX ADMIN — CEFTRIAXONE 1000 MG: 1 INJECTION, POWDER, FOR SOLUTION INTRAMUSCULAR; INTRAVENOUS at 10:58

## 2025-06-01 ASSESSMENT — PAIN - FUNCTIONAL ASSESSMENT: PAIN_FUNCTIONAL_ASSESSMENT: NONE - DENIES PAIN

## 2025-06-01 NOTE — ED PROVIDER NOTES
Filt Rate 27 (L) >60 ml/min/1.73m2    Calcium 9.3 8.5 - 10.1 MG/DL    Total Bilirubin 0.2 0.2 - 1.0 MG/DL    ALT 10 10 - 35 U/L    AST 13 10 - 38 U/L    Alk Phosphatase 61 45 - 117 U/L    Total Protein 7.8 6.4 - 8.2 g/dL    Albumin 4.1 3.4 - 5.0 g/dL    Globulin 3.7 (H) 2.30 - 3.50 g/dL    Albumin/Globulin Ratio 1.1 1.0 - 1.90     Urinalysis    Collection Time: 06/01/25 10:01 AM   Result Value Ref Range    Color, UA YELLOW      Appearance TURBID      Specific Gravity, UA 1.012 1.005 - 1.030      pH, Urine 8.5 (H) 5.0 - 8.0      Protein, UA 30 (A) NEG mg/dL    Glucose, Ur 500 (A) NEG mg/dL    Ketones, Urine Negative NEG mg/dL    Bilirubin, Urine Negative NEG      Blood, Urine TRACE (A) NEG      Urobilinogen, Urine 0.2 0.2 - 1.0 EU/dL    Nitrite, Urine Positive (A) NEG      Leukocyte Esterase, Urine MODERATE (A) NEG     Urinalysis, Micro    Collection Time: 06/01/25 10:01 AM   Result Value Ref Range    WBC, UA 4-10 0 - 4 /hpf    RBC, UA 0-3 0 - 5 /hpf    Epithelial Cells, UA FEW 0 - 5 /lpf    BACTERIA, URINE 2+ (A) NEG /hpf    TRIPLE PHOSPHATE CRYSTALS FEW (A) NEG         Radiologic Studies:   No orders to display       EKG interpretation: (Preliminary)  EKG read by Dr. Jasmin Stauffer at 0     Procedures     Procedures    ED Course     10:10 AM EDT I (Jasmin Stauffer MD) am the first provider for this patient. Initial assessment performed. I reviewed the vital signs, available nursing notes, past medical history, past surgical history, family history and social history. The patients presenting problems have been discussed, and they are in agreement with the care plan formulated and outlined with them.  I have encouraged them to ask questions as they arise throughout their visit.    Records Reviewed: Nursing Notes and Old Medical Records    Is this patient to be included in the SEP-1 core measure? No Exclusion criteria - the patient is NOT to be included for SEP-1 Core Measure due to: 2+ SIRS criteria are not

## 2025-06-04 ENCOUNTER — OFFICE VISIT (OUTPATIENT)
Age: 75
End: 2025-06-04
Payer: MEDICARE

## 2025-06-04 VITALS
WEIGHT: 209 LBS | HEIGHT: 67 IN | HEART RATE: 78 BPM | BODY MASS INDEX: 32.8 KG/M2 | DIASTOLIC BLOOD PRESSURE: 73 MMHG | SYSTOLIC BLOOD PRESSURE: 116 MMHG | OXYGEN SATURATION: 100 %

## 2025-06-04 DIAGNOSIS — E05.90 HYPERTHYROIDISM: ICD-10-CM

## 2025-06-04 DIAGNOSIS — R00.2 PALPITATION: ICD-10-CM

## 2025-06-04 DIAGNOSIS — R07.9 CHEST PAIN, UNSPECIFIED TYPE: Primary | ICD-10-CM

## 2025-06-04 DIAGNOSIS — Z82.49 FAMILY HX OF AORTIC ANEURYSM: ICD-10-CM

## 2025-06-04 LAB
BACTERIA SPEC CULT: ABNORMAL
BACTERIA SPEC CULT: ABNORMAL
CC UR VC: ABNORMAL
SERVICE CMNT-IMP: ABNORMAL

## 2025-06-04 PROCEDURE — 1123F ACP DISCUSS/DSCN MKR DOCD: CPT | Performed by: INTERNAL MEDICINE

## 2025-06-04 PROCEDURE — 99214 OFFICE O/P EST MOD 30 MIN: CPT | Performed by: INTERNAL MEDICINE

## 2025-06-04 ASSESSMENT — PATIENT HEALTH QUESTIONNAIRE - PHQ9
1. LITTLE INTEREST OR PLEASURE IN DOING THINGS: NOT AT ALL
SUM OF ALL RESPONSES TO PHQ QUESTIONS 1-9: 0
SUM OF ALL RESPONSES TO PHQ QUESTIONS 1-9: 0
2. FEELING DOWN, DEPRESSED OR HOPELESS: NOT AT ALL
SUM OF ALL RESPONSES TO PHQ QUESTIONS 1-9: 0
SUM OF ALL RESPONSES TO PHQ QUESTIONS 1-9: 0

## 2025-06-04 NOTE — PROGRESS NOTES
Identified pt with two pt identifiers(name and ). Reviewed record in preparation for visit and have obtained necessary documentation.    Odilon Young presents today for   Chief Complaint   Patient presents with    Follow-up     post ED f/u; low bp         Pt denied DIZZINESS, SOB, CHEST PAIN/ PRESSURE, FATIGUE/WEAKNESS, HEADACHES, SWELLING.             Odilon Young preferred language for health care discussion is english/other.    Personal Protective Equipment:   Personal Protective Equipment was used including: mask-surgical and hands-gloves. Patient was placed on no precaution(s). Patient was not masked.    Precautions:   Patient currently on None  Patient currently roomed with door closed.    Is someone accompanying this pt? no    Is the patient using any DME equipment during OV? walker    Depression Screenin/4/2025     3:35 PM 2023     9:22 AM 2021    10:43 AM   PHQ-9 Questionaire   Little interest or pleasure in doing things 0 0 0   Feeling down, depressed, or hopeless 0 0 0   PHQ-9 Total Score 0 0 0        Learning Assessment:  Who is the primary learner? Patient    What is the preferred language for health care of the primary learner? ENGLISH    How does the primary learner prefer to learn new concepts? DEMONSTRATION    Answered By self    Relationship to Learner SELF        Abuse Screenin/4/2025     3:00 PM   AMB Abuse Screening   Do you ever feel afraid of your partner? N   Are you in a relationship with someone who physically or mentally threatens you? N   Is it safe for you to go home? Y          Fall Risk      2025     3:36 PM 2023     9:22 AM   Fall Risk   Do you feel unsteady or are you worried about falling?  no no   2 or more falls in past year? no no   Fall with injury in past year? no no         Pt currently taking Anticoagulant /Antiplatelet therapy? no    Coordination of Care:  1. Have you been to the ER, urgent care clinic since your last visit?

## 2025-07-14 ENCOUNTER — HOSPITAL ENCOUNTER (OUTPATIENT)
Facility: HOSPITAL | Age: 75
Setting detail: SPECIMEN
Discharge: HOME | End: 2025-07-17
Payer: MEDICARE

## 2025-07-14 ENCOUNTER — OFFICE VISIT (OUTPATIENT)
Facility: CLINIC | Age: 75
End: 2025-07-14
Payer: MEDICARE

## 2025-07-14 VITALS
DIASTOLIC BLOOD PRESSURE: 83 MMHG | WEIGHT: 206 LBS | OXYGEN SATURATION: 98 % | HEIGHT: 67 IN | RESPIRATION RATE: 18 BRPM | BODY MASS INDEX: 32.33 KG/M2 | HEART RATE: 62 BPM | SYSTOLIC BLOOD PRESSURE: 133 MMHG | TEMPERATURE: 97.2 F

## 2025-07-14 DIAGNOSIS — M17.10 ARTHRITIS OF KNEE: ICD-10-CM

## 2025-07-14 DIAGNOSIS — N30.00 ACUTE CYSTITIS WITHOUT HEMATURIA: ICD-10-CM

## 2025-07-14 DIAGNOSIS — Z82.49 FAMILY HX OF AORTIC ANEURYSM: ICD-10-CM

## 2025-07-14 DIAGNOSIS — N18.32 STAGE 3B CHRONIC KIDNEY DISEASE (CKD) (HCC): ICD-10-CM

## 2025-07-14 DIAGNOSIS — E78.5 DYSLIPIDEMIA: ICD-10-CM

## 2025-07-14 DIAGNOSIS — E89.0 POSTOPERATIVE HYPOTHYROIDISM: ICD-10-CM

## 2025-07-14 DIAGNOSIS — Z97.8 FOLEY CATHETER IN PLACE: ICD-10-CM

## 2025-07-14 DIAGNOSIS — E11.21 TYPE 2 DIABETES MELLITUS WITH NEPHROPATHY (HCC): ICD-10-CM

## 2025-07-14 DIAGNOSIS — Z76.89 ENCOUNTER TO ESTABLISH CARE WITH NEW PROVIDER: ICD-10-CM

## 2025-07-14 DIAGNOSIS — E11.21 TYPE 2 DIABETES MELLITUS WITH NEPHROPATHY (HCC): Primary | ICD-10-CM

## 2025-07-14 DIAGNOSIS — E66.811 CLASS 1 OBESITY DUE TO EXCESS CALORIES WITH SERIOUS COMORBIDITY AND BODY MASS INDEX (BMI) OF 32.0 TO 32.9 IN ADULT: ICD-10-CM

## 2025-07-14 DIAGNOSIS — E66.09 CLASS 1 OBESITY DUE TO EXCESS CALORIES WITH SERIOUS COMORBIDITY AND BODY MASS INDEX (BMI) OF 32.0 TO 32.9 IN ADULT: ICD-10-CM

## 2025-07-14 DIAGNOSIS — E89.0 POST-SURGICAL HYPOTHYROIDISM: ICD-10-CM

## 2025-07-14 LAB
ALBUMIN SERPL-MCNC: 3.9 G/DL (ref 3.4–5)
ALBUMIN/GLOB SERPL: 1.1 (ref 0.8–1.7)
ALP SERPL-CCNC: 60 U/L (ref 45–117)
ALT SERPL-CCNC: 13 U/L (ref 10–35)
ANION GAP SERPL CALC-SCNC: 14 MMOL/L (ref 3–18)
AST SERPL-CCNC: 16 U/L (ref 10–38)
BILIRUB SERPL-MCNC: 0.3 MG/DL (ref 0.2–1)
BUN SERPL-MCNC: 33 MG/DL (ref 6–23)
BUN/CREAT SERPL: 18 (ref 12–20)
CALCIUM SERPL-MCNC: 10 MG/DL (ref 8.5–10.1)
CHLORIDE SERPL-SCNC: 105 MMOL/L (ref 98–107)
CHOLEST SERPL-MCNC: 181 MG/DL
CO2 SERPL-SCNC: 24 MMOL/L (ref 21–32)
CREAT SERPL-MCNC: 1.77 MG/DL (ref 0.6–1.3)
CREAT UR-MCNC: 67.2 MG/DL (ref 30–125)
EST. AVERAGE GLUCOSE BLD GHB EST-MCNC: 113 MG/DL
GLOBULIN SER CALC-MCNC: 3.5 G/DL (ref 2–4)
GLUCOSE SERPL-MCNC: 99 MG/DL (ref 74–108)
HBA1C MFR BLD: 5.6 % (ref 4.2–5.6)
HDLC SERPL-MCNC: 44 MG/DL (ref 40–60)
HDLC SERPL: 4.1 (ref 0–5)
LDLC SERPL CALC-MCNC: 116 MG/DL (ref 0–100)
MICROALBUMIN UR-MCNC: 8.6 MG/DL (ref 0–3)
MICROALBUMIN/CREAT UR-RTO: 128 MG/G (ref 0–30)
POTASSIUM SERPL-SCNC: 4.8 MMOL/L (ref 3.5–5.5)
PROT SERPL-MCNC: 7.4 G/DL (ref 6.4–8.2)
SODIUM SERPL-SCNC: 143 MMOL/L (ref 136–145)
TRIGL SERPL-MCNC: 104 MG/DL (ref 0–150)
VLDLC SERPL CALC-MCNC: 21 MG/DL

## 2025-07-14 PROCEDURE — 83036 HEMOGLOBIN GLYCOSYLATED A1C: CPT

## 2025-07-14 PROCEDURE — 82570 ASSAY OF URINE CREATININE: CPT

## 2025-07-14 PROCEDURE — 99214 OFFICE O/P EST MOD 30 MIN: CPT | Performed by: FAMILY MEDICINE

## 2025-07-14 PROCEDURE — 82043 UR ALBUMIN QUANTITATIVE: CPT

## 2025-07-14 PROCEDURE — 3044F HG A1C LEVEL LT 7.0%: CPT | Performed by: FAMILY MEDICINE

## 2025-07-14 PROCEDURE — 80061 LIPID PANEL: CPT

## 2025-07-14 PROCEDURE — 1123F ACP DISCUSS/DSCN MKR DOCD: CPT | Performed by: FAMILY MEDICINE

## 2025-07-14 PROCEDURE — G2211 COMPLEX E/M VISIT ADD ON: HCPCS | Performed by: FAMILY MEDICINE

## 2025-07-14 PROCEDURE — 36415 COLL VENOUS BLD VENIPUNCTURE: CPT

## 2025-07-14 PROCEDURE — 80053 COMPREHEN METABOLIC PANEL: CPT

## 2025-07-14 SDOH — ECONOMIC STABILITY: FOOD INSECURITY: WITHIN THE PAST 12 MONTHS, THE FOOD YOU BOUGHT JUST DIDN'T LAST AND YOU DIDN'T HAVE MONEY TO GET MORE.: NEVER TRUE

## 2025-07-14 SDOH — ECONOMIC STABILITY: FOOD INSECURITY: WITHIN THE PAST 12 MONTHS, YOU WORRIED THAT YOUR FOOD WOULD RUN OUT BEFORE YOU GOT MONEY TO BUY MORE.: NEVER TRUE

## 2025-07-14 ASSESSMENT — PATIENT HEALTH QUESTIONNAIRE - PHQ9
SUM OF ALL RESPONSES TO PHQ QUESTIONS 1-9: 0
1. LITTLE INTEREST OR PLEASURE IN DOING THINGS: NOT AT ALL
SUM OF ALL RESPONSES TO PHQ QUESTIONS 1-9: 0
2. FEELING DOWN, DEPRESSED OR HOPELESS: NOT AT ALL
SUM OF ALL RESPONSES TO PHQ QUESTIONS 1-9: 0
SUM OF ALL RESPONSES TO PHQ QUESTIONS 1-9: 0

## 2025-07-14 NOTE — PROGRESS NOTES
Odilon Young (: 1950) is a 74 y.o. female, New  patient, here for:  Chief Complaint   Patient presents with    New Patient      ASSESSMENT/PLAN:  Type 2 diabetes mellitus with nephropathy (HCC)  -     Hemoglobin A1C; Future  -     Albumin/Creatinine Ratio, Urine; Future  -     Lipid Panel; Future  -     Comprehensive Metabolic Panel; Future  Encounter to establish care with new provider  Post-surgical hypothyroidism  Gaitan catheter in place  Arthritis of knee  Dyslipidemia  -     Lipid Panel; Future  -     Comprehensive Metabolic Panel; Future  Family hx of aortic aneurysm  Stage 3b chronic kidney disease (CKD) (HCC)  -     Albumin/Creatinine Ratio, Urine; Future  Acute cystitis without hematuria  Class 1 obesity due to excess calories with serious comorbidity and body mass index (BMI) of 32.0 to 32.9 in adult  Postoperative hypothyroidism           Diabetes-- Last A1c was 6.2 in 2020  Controlled on metformin 500 mg once daily  Continue low carbohydrate diet and avoid sweets.   Exercise for 30 minutes, most days of the week.  Check labs today          Hypothyroidism--- on Levothyroxine 88 mcg a day        Hyperlipidemia-  on pravastatin 80 mg a day      CKD--- managed by nephrology specialist      Acute cystitis--currently treated with Levaquin.  Followed by [facility] specialist    Seasonal allergies-stable on Xyzal 5 mg a day      Obesity---   -Recommend a low calorie diet  -Patient encouraged to exercise for 30 minutes 3 to 5 times a week.    -Recommend eating a well balanced healthy diet. (Consistent of lean meats, lots of fruits, vegetables and whole grains).    -Limit processed foods.   -Drink 32 to 64 ounces of fluid each day  -Eat 2 to 3 g of fiber each day          Follow-up and Dispositions    Return in about 3 months (around 10/14/2025) for HTN, DM, HLD, THYROID, OV extended.            SUBJECTIVE/OBJECTIVE:  This is a 74 y.o. female New patient to practice.  Her prior PCP was

## 2025-07-16 ENCOUNTER — OFFICE VISIT (OUTPATIENT)
Age: 75
End: 2025-07-16
Payer: MEDICARE

## 2025-07-16 VITALS
DIASTOLIC BLOOD PRESSURE: 71 MMHG | HEIGHT: 68 IN | HEART RATE: 71 BPM | BODY MASS INDEX: 30.92 KG/M2 | WEIGHT: 204 LBS | RESPIRATION RATE: 99 BRPM | SYSTOLIC BLOOD PRESSURE: 151 MMHG

## 2025-07-16 DIAGNOSIS — R07.9 CHEST PAIN, UNSPECIFIED TYPE: Primary | ICD-10-CM

## 2025-07-16 PROCEDURE — 93000 ELECTROCARDIOGRAM COMPLETE: CPT | Performed by: INTERNAL MEDICINE

## 2025-07-16 PROCEDURE — 1123F ACP DISCUSS/DSCN MKR DOCD: CPT | Performed by: INTERNAL MEDICINE

## 2025-07-16 PROCEDURE — 99214 OFFICE O/P EST MOD 30 MIN: CPT | Performed by: INTERNAL MEDICINE

## 2025-07-16 NOTE — PROGRESS NOTES
Identified pt with two pt identifiers(name and ). Reviewed record in preparation for visit and have obtained necessary documentation.    Odilon Young presents today for   Chief Complaint   Patient presents with    Follow-up     5weeks f/u       Pt c/o MID-CHEST PAIN/ PRESSURE, BLE SWELLING.             Odilon Young preferred language for health care discussion is english/other.    Personal Protective Equipment:   Personal Protective Equipment was used including: mask-surgical and hands-gloves. Patient was placed on no precaution(s). Patient was not masked.    Precautions:   Patient currently on None  Patient currently roomed with door closed.    Is someone accompanying this pt?      Is the patient using any DME equipment during OV? CANE    Depression Screenin/14/2025     2:08 PM 2025     3:35 PM 2023     9:22 AM 2021    10:43 AM   PHQ-9 Questionaire   Little interest or pleasure in doing things 0 0 0 0   Feeling down, depressed, or hopeless 0 0 0 0   PHQ-9 Total Score 0 0 0 0        Learning Assessment:  No question data found.    Abuse Screenin/16/2025     2:00 PM 2025     3:00 PM   AMB Abuse Screening   Do you ever feel afraid of your partner? N N   Are you in a relationship with someone who physically or mentally threatens you? N N   Is it safe for you to go home? Y Y        Fall Risk      2025     2:38 PM 2025     3:36 PM 2023     9:22 AM   Fall Risk   Do you feel unsteady or are you worried about falling?  no no no   2 or more falls in past year? no no no   Fall with injury in past year? no no no         Pt currently taking Anticoagulant /Antiplatelet therapy? No     Coordination of Care:  1. Have you been to the ER, urgent care clinic since your last visit? Hospitalized since your last visit? no    2. Have you seen or consulted any other health care providers outside of the [facility] since your last visit? Include any pap smears or

## 2025-07-30 ENCOUNTER — RESULTS FOLLOW-UP (OUTPATIENT)
Facility: CLINIC | Age: 75
End: 2025-07-30

## 2025-07-30 DIAGNOSIS — E78.5 DYSLIPIDEMIA: Primary | ICD-10-CM

## 2025-07-30 RX ORDER — EZETIMIBE 10 MG/1
10 TABLET ORAL DAILY
Qty: 90 TABLET | Refills: 1 | Status: SHIPPED | OUTPATIENT
Start: 2025-07-30

## 2025-08-14 RX ORDER — VITAMIN B COMPLEX
1 TABLET ORAL DAILY
COMMUNITY

## 2025-08-14 RX ORDER — SODIUM CHLORIDE 0.9 % (FLUSH) 0.9 %
5-40 SYRINGE (ML) INJECTION PRN
OUTPATIENT
Start: 2025-08-19

## 2025-08-14 RX ORDER — SODIUM CHLORIDE 9 MG/ML
INJECTION, SOLUTION INTRAVENOUS PRN
OUTPATIENT
Start: 2025-08-19

## 2025-08-14 RX ORDER — SODIUM CHLORIDE 0.9 % (FLUSH) 0.9 %
5-40 SYRINGE (ML) INJECTION EVERY 12 HOURS SCHEDULED
OUTPATIENT
Start: 2025-08-19

## 2025-08-18 ENCOUNTER — ANESTHESIA EVENT (OUTPATIENT)
Facility: HOSPITAL | Age: 75
End: 2025-08-18
Payer: MEDICARE

## 2025-08-19 ENCOUNTER — ANESTHESIA (OUTPATIENT)
Facility: HOSPITAL | Age: 75
End: 2025-08-19
Payer: MEDICARE

## 2025-08-19 ENCOUNTER — APPOINTMENT (OUTPATIENT)
Facility: HOSPITAL | Age: 75
End: 2025-08-19
Attending: UROLOGY
Payer: MEDICARE

## 2025-08-19 ENCOUNTER — HOSPITAL ENCOUNTER (OUTPATIENT)
Facility: HOSPITAL | Age: 75
Setting detail: OUTPATIENT SURGERY
Discharge: HOME | End: 2025-08-19
Attending: UROLOGY | Admitting: UROLOGY
Payer: MEDICARE

## 2025-08-19 VITALS
OXYGEN SATURATION: 98 % | HEART RATE: 64 BPM | WEIGHT: 203.4 LBS | HEIGHT: 67 IN | SYSTOLIC BLOOD PRESSURE: 145 MMHG | BODY MASS INDEX: 31.92 KG/M2 | RESPIRATION RATE: 16 BRPM | TEMPERATURE: 97.7 F | DIASTOLIC BLOOD PRESSURE: 65 MMHG

## 2025-08-19 LAB
EKG ATRIAL RATE: 62 BPM
EKG DIAGNOSIS: NORMAL
EKG P AXIS: 59 DEGREES
EKG P-R INTERVAL: 168 MS
EKG Q-T INTERVAL: 428 MS
EKG QRS DURATION: 78 MS
EKG QTC CALCULATION (BAZETT): 434 MS
EKG R AXIS: 1 DEGREES
EKG T AXIS: 31 DEGREES
EKG VENTRICULAR RATE: 62 BPM
GLUCOSE BLD STRIP.AUTO-MCNC: 134 MG/DL (ref 70–110)

## 2025-08-19 PROCEDURE — 3600000002 HC SURGERY LEVEL 2 BASE: Performed by: UROLOGY

## 2025-08-19 PROCEDURE — 93005 ELECTROCARDIOGRAM TRACING: CPT | Performed by: ANESTHESIOLOGY

## 2025-08-19 PROCEDURE — 7100000011 HC PHASE II RECOVERY - ADDTL 15 MIN: Performed by: UROLOGY

## 2025-08-19 PROCEDURE — 2709999900 HC NON-CHARGEABLE SUPPLY: Performed by: UROLOGY

## 2025-08-19 PROCEDURE — 3600000012 HC SURGERY LEVEL 2 ADDTL 15MIN: Performed by: UROLOGY

## 2025-08-19 PROCEDURE — 7100000010 HC PHASE II RECOVERY - FIRST 15 MIN: Performed by: UROLOGY

## 2025-08-19 PROCEDURE — 93010 ELECTROCARDIOGRAM REPORT: CPT | Performed by: INTERNAL MEDICINE

## 2025-08-19 PROCEDURE — C1894 INTRO/SHEATH, NON-LASER: HCPCS | Performed by: UROLOGY

## 2025-08-19 PROCEDURE — 2580000003 HC RX 258: Performed by: NURSE ANESTHETIST, CERTIFIED REGISTERED

## 2025-08-19 PROCEDURE — C1769 GUIDE WIRE: HCPCS | Performed by: UROLOGY

## 2025-08-19 PROCEDURE — 6370000000 HC RX 637 (ALT 250 FOR IP): Performed by: NURSE ANESTHETIST, CERTIFIED REGISTERED

## 2025-08-19 PROCEDURE — 82962 GLUCOSE BLOOD TEST: CPT

## 2025-08-19 RX ORDER — SODIUM CHLORIDE, SODIUM LACTATE, POTASSIUM CHLORIDE, CALCIUM CHLORIDE 600; 310; 30; 20 MG/100ML; MG/100ML; MG/100ML; MG/100ML
INJECTION, SOLUTION INTRAVENOUS CONTINUOUS
Status: DISCONTINUED | OUTPATIENT
Start: 2025-08-19 | End: 2025-08-19 | Stop reason: HOSPADM

## 2025-08-19 RX ORDER — LIDOCAINE HYDROCHLORIDE 10 MG/ML
1 INJECTION, SOLUTION EPIDURAL; INFILTRATION; INTRACAUDAL; PERINEURAL
Status: DISCONTINUED | OUTPATIENT
Start: 2025-08-19 | End: 2025-08-19 | Stop reason: HOSPADM

## 2025-08-19 RX ORDER — SODIUM CHLORIDE 0.9 % (FLUSH) 0.9 %
5-40 SYRINGE (ML) INJECTION PRN
Status: DISCONTINUED | OUTPATIENT
Start: 2025-08-19 | End: 2025-08-19 | Stop reason: HOSPADM

## 2025-08-19 RX ORDER — SODIUM CHLORIDE 0.9 % (FLUSH) 0.9 %
5-40 SYRINGE (ML) INJECTION EVERY 12 HOURS SCHEDULED
Status: DISCONTINUED | OUTPATIENT
Start: 2025-08-19 | End: 2025-08-19 | Stop reason: HOSPADM

## 2025-08-19 RX ORDER — SODIUM CHLORIDE 9 MG/ML
INJECTION, SOLUTION INTRAVENOUS PRN
Status: DISCONTINUED | OUTPATIENT
Start: 2025-08-19 | End: 2025-08-19 | Stop reason: HOSPADM

## 2025-08-19 RX ORDER — FAMOTIDINE 20 MG/1
20 TABLET, FILM COATED ORAL ONCE
Status: COMPLETED | OUTPATIENT
Start: 2025-08-19 | End: 2025-08-19

## 2025-08-19 RX ADMIN — FAMOTIDINE 20 MG: 20 TABLET, FILM COATED ORAL at 07:45

## 2025-08-19 RX ADMIN — SODIUM CHLORIDE, SODIUM LACTATE, POTASSIUM CHLORIDE, AND CALCIUM CHLORIDE: .6; .31; .03; .02 INJECTION, SOLUTION INTRAVENOUS at 07:45

## 2025-08-19 ASSESSMENT — PAIN - FUNCTIONAL ASSESSMENT: PAIN_FUNCTIONAL_ASSESSMENT: 0-10

## 2025-08-20 ENCOUNTER — TELEPHONE (OUTPATIENT)
Facility: CLINIC | Age: 75
End: 2025-08-20

## 2025-08-22 DIAGNOSIS — J30.89 ENVIRONMENTAL AND SEASONAL ALLERGIES: Primary | ICD-10-CM

## 2025-08-23 RX ORDER — LEVOCETIRIZINE DIHYDROCHLORIDE 5 MG/1
5 TABLET, FILM COATED ORAL DAILY PRN
Qty: 30 TABLET | Refills: 2 | Status: SHIPPED | OUTPATIENT
Start: 2025-08-23

## 2025-08-26 ENCOUNTER — TELEPHONE (OUTPATIENT)
Facility: CLINIC | Age: 75
End: 2025-08-26

## 2025-08-26 RX ORDER — LEVOTHYROXINE SODIUM 88 UG/1
88 TABLET ORAL DAILY
Qty: 90 TABLET | Refills: 1 | Status: SHIPPED | OUTPATIENT
Start: 2025-08-26

## (undated) DEVICE — NEEDLE HYPO 21GA L1.5IN INTRAMUSCULAR S STL LATCH BVL UP

## (undated) DEVICE — GUIDEWIRE ENDOSCP L150CM DIA0.035IN TIP 3CM PTFE NIT

## (undated) DEVICE — Device

## (undated) DEVICE — ANTI-EMBOLISM STOCKINGS,KNEE LENGTH,X-LARGE,LONG,SIZE H-: Brand: T.E.D.

## (undated) DEVICE — TUBING SUCT L12FT DIA0.1875IN CONN UNIV W/ STR RIB CONN

## (undated) DEVICE — SYRINGE MED 3ML NDL 22GA L1 1/2IN REG BVL SFGLDE

## (undated) DEVICE — HANDPIECE SET WITH HIGH FLOW TIP AND SUCTION TUBE: Brand: INTERPULSE

## (undated) DEVICE — LINER SUCT CANSTR 3000CC PLAS SFT PRE ASSEMB W/OUT TBNG W/

## (undated) DEVICE — 4-PORT MANIFOLD: Brand: NEPTUNE 2

## (undated) DEVICE — GOWN SURG XL L56IN XLN BRTH FLM COMFORTABLE HK LOOP CLSR

## (undated) DEVICE — STRYKER PERFORMANCE SERIES SAGITTAL BLADE: Brand: STRYKER PERFORMANCE SERIES

## (undated) DEVICE — BAG DRAIN UROLOGY W/HOSE

## (undated) DEVICE — MEDI-VAC NON-CONDUCTIVE SUCTION TUBING: Brand: CARDINAL HEALTH

## (undated) DEVICE — SOLUTION IV 1000ML 0.9% SOD CHL

## (undated) DEVICE — SOLUTION IRRIG 1000ML H2O STRL BLT

## (undated) DEVICE — Z DISCONTINUED USE 2744636  DRESSING AQUACEL 14 IN ALG W3.5XL14IN POLYUR FLM CVR W/ HYDRCOLL

## (undated) DEVICE — REM POLYHESIVE ADULT PATIENT RETURN ELECTRODE: Brand: VALLEYLAB

## (undated) DEVICE — SMARTSLEEVE SURGICAL GOWN, 3XL LONG: Brand: CONVERTORS

## (undated) DEVICE — SLIM BODY SKIN STAPLER: Brand: APPOSE ULC

## (undated) DEVICE — BIPOLAR SEALER 23-112-1 AQM 6.0: Brand: AQUAMANTYS ®

## (undated) DEVICE — SYRINGE MED 10ML LUERLOCK TIP W/O SFTY DISP

## (undated) DEVICE — NEEDLE SPNL 22GA L3.5IN BLK HUB S STL REG WALL FIT STYL W/

## (undated) DEVICE — SOLUTION IRRIG 3000ML 0.9% SOD CHL FLX CONT 0797208] ICU MEDICAL INC]

## (undated) DEVICE — DRSG PATCH ANTIMIC 1INX4.0MM -- CONVERT TO ITEM 356053

## (undated) DEVICE — SUTURE VCRL SZ 0 L36IN ABSRB UD L36MM CT-1 1/2 CIR J946H

## (undated) DEVICE — SKIN MARKER,REGULAR TIP WITH RULER AND LABELS: Brand: DEVON

## (undated) DEVICE — TOWEL SURG W17XL27IN BLU COT STD PREWASHED DELINTED 4 PER STRL PK

## (undated) DEVICE — JP 3-SPRING RES W/15FR PVC DRAIN/TR: Brand: CARDINAL HEALTH

## (undated) DEVICE — SYRINGE MED 50ML LUERSLIP TIP

## (undated) DEVICE — GAUZE,SPONGE,4"X4",16PLY,STRL,LF,10/TRAY: Brand: MEDLINE

## (undated) DEVICE — BOWL AND CEMENT CARTRIDGE WITH BREAKAWAY FEMORAL NOZZLE: Brand: ACM

## (undated) DEVICE — ELASTIC BANDAGE 6": Brand: CARDINAL HEALTH

## (undated) DEVICE — ADAPTER INSTR 9FR UNIV CK FLO

## (undated) DEVICE — SYR LR LCK 1ML GRAD NSAF 30ML --

## (undated) DEVICE — BLADE RMFG DBL RECIP DBL SD --

## (undated) DEVICE — COVER LT HNDL BLU STRL -- MEDICHOICE

## (undated) DEVICE — GOWN PLASTIC FILM THMBHKS UNIV BLUE: Brand: CARDINAL HEALTH

## (undated) DEVICE — PREP SKN CHLRAPRP APPL 10.5ML --

## (undated) DEVICE — INTENDED FOR TISSUE SEPARATION, AND OTHER PROCEDURES THAT REQUIRE A SHARP SURGICAL BLADE TO PUNCTURE OR CUT.: Brand: BARD-PARKER SAFETY BLADES SIZE 10, STERILE

## (undated) DEVICE — INTENDED FOR TISSUE SEPARATION, AND OTHER PROCEDURES THAT REQUIRE A SHARP SURGICAL BLADE TO PUNCTURE OR CUT.: Brand: BARD-PARKER ® STAINLESS STEEL BLADES

## (undated) DEVICE — CANNULA NSL AD TBNG L14FT STD PVC O2 CRV CONN NONFLARED NSL

## (undated) DEVICE — SPONGE GZ 16 PLY WVN COT 4INX4IN STERIL

## (undated) DEVICE — Z DISCONTINUED BY MEDLINE USE 2711682 TRAY SKIN PREP DRY W/ PREM GLV

## (undated) DEVICE — CATHETER SUCT TR FL TIP 14FR W/ O CTRL

## (undated) DEVICE — 3M™ BAIR PAWS FLEX™ WARMING GOWN, STANDARD, 20 PER CASE 81003: Brand: BAIR PAWS™

## (undated) DEVICE — CANNULA ORIG TL CLR W FOAM CUSHIONS AND 14FT SUPL TB 3 CHN

## (undated) DEVICE — PACK SURG BSHR TOT KNEE LF

## (undated) DEVICE — SNARE POLYP M W27MMXL240CM OVL STIFF DISP CAPTIVATOR

## (undated) DEVICE — SYRINGE MED 25GA 3ML L5/8IN SUBQ PLAS W/ DETACH NDL SFTY

## (undated) DEVICE — FLUFF AND POLYMER UNDERPAD,EXTRA HEAVY: Brand: WINGS

## (undated) DEVICE — KIT CLN UP BON SECOURS MARYV

## (undated) DEVICE — X-RAY SPONGES,12 PLY: Brand: DERMACEA

## (undated) DEVICE — SUTURE VCRL SZ 2 L27IN ABSRB VLT L65MM TP-1 1/2 CIR J649G

## (undated) DEVICE — (D)SYR 10ML 1/5ML GRAD NSAF -- PKGING CHANGE USE ITEM 338027

## (undated) DEVICE — SPONGE LAP 18X18IN STRL -- 5/PK

## (undated) DEVICE — GOWN,REINFORCED,POLY,AURORA,XXLARGE,STR: Brand: MEDLINE

## (undated) DEVICE — SYRINGE 20ML LL S/C 50

## (undated) DEVICE — DISPOSABLE TOURNIQUET CUFF SINGLE BLADDER, DUAL PORT AND QUICK CONNECT CONNECTOR: Brand: COLOR CUFF

## (undated) DEVICE — 3M™ TEGADERM™ TRANSPARENT FILM DRESSING FRAME STYLE, 1626W, 4 IN X 4-3/4 IN (10 CM X 12 CM), 50/CT 4CT/CASE: Brand: 3M™ TEGADERM™

## (undated) DEVICE — ENDOSCOPY PUMP TUBING/ CAP SET: Brand: ERBE

## (undated) DEVICE — 3M™ STERI-DRAPE™ INSTRUMENT POUCH 1018: Brand: STERI-DRAPE™

## (undated) DEVICE — FCPS RAD JAW 4LC 240CM W/NDL -- BX/40

## (undated) DEVICE — TRAP SPEC POLYP REM STRNR CLN DSGN MAGNIFYING WIND DISP

## (undated) DEVICE — YANKAUER,SMOOTH HANDLE,HIGH CAPACITY: Brand: MEDLINE INDUSTRIES, INC.

## (undated) DEVICE — UROLOGY SET

## (undated) DEVICE — SUTURE MCRYL SZ 2-0 L36IN ABSRB UD L36MM CT-1 1/2 CIR Y945H

## (undated) DEVICE — JELLY LUBRICATING 2.7GM H2O SOL GREASELESS E-Z

## (undated) DEVICE — SOFT SILICONE HYDROCELLULAR SACRUM DRESSING WITH LOCK AWAY LAYER: Brand: ALLEVYN LIFE SACRUM (LARGE) PACK OF 10

## (undated) DEVICE — SOLUTION IRRIG 1000ML 0.9% SOD CHL USP POUR PLAS BTL

## (undated) DEVICE — T5 HOOD WITH PEEL AWAY FACE SHIELD

## (undated) DEVICE — NEEDLE HYPO 18GA L1.5IN PNK S STL HUB POLYPR SHLD REG BVL